# Patient Record
Sex: MALE | Race: WHITE | HISPANIC OR LATINO | Employment: FULL TIME | URBAN - METROPOLITAN AREA
[De-identification: names, ages, dates, MRNs, and addresses within clinical notes are randomized per-mention and may not be internally consistent; named-entity substitution may affect disease eponyms.]

---

## 2017-10-11 ENCOUNTER — HOSPITAL ENCOUNTER (EMERGENCY)
Facility: HOSPITAL | Age: 57
Discharge: HOME | End: 2017-10-11
Attending: EMERGENCY MEDICINE | Admitting: EMERGENCY MEDICINE
Payer: COMMERCIAL

## 2017-10-11 VITALS
TEMPERATURE: 98 F | DIASTOLIC BLOOD PRESSURE: 86 MMHG | OXYGEN SATURATION: 99 % | HEART RATE: 79 BPM | SYSTOLIC BLOOD PRESSURE: 146 MMHG | RESPIRATION RATE: 18 BRPM

## 2017-10-11 DIAGNOSIS — S39.012A STRAIN OF LUMBAR REGION, INITIAL ENCOUNTER: Primary | ICD-10-CM

## 2017-10-11 PROCEDURE — 99283 EMERGENCY DEPT VISIT LOW MDM: CPT

## 2017-10-11 RX ORDER — ALFUZOSIN HYDROCHLORIDE 10 MG/1
10 TABLET, EXTENDED RELEASE ORAL DAILY
COMMUNITY

## 2017-10-11 RX ORDER — CYCLOBENZAPRINE HCL 10 MG
10 TABLET ORAL 3 TIMES DAILY
Qty: 15 TABLET | Refills: 0 | Status: SHIPPED | OUTPATIENT
Start: 2017-10-11 | End: 2020-03-13 | Stop reason: SDUPTHER

## 2017-10-11 RX ORDER — HYDROCODONE BITARTRATE AND ACETAMINOPHEN 5; 325 MG/1; MG/1
1 TABLET ORAL EVERY 6 HOURS PRN
Qty: 15 TABLET | Refills: 0 | Status: SHIPPED | OUTPATIENT
Start: 2017-10-11 | End: 2020-03-13 | Stop reason: ALTCHOICE

## 2017-10-11 NOTE — DISCHARGE INSTRUCTIONS
Low Back Strain   WHAT YOU NEED TO KNOW:   Low back strain is an injury to your lower back muscles or tendons  Tendons are strong tissues that connect muscles to bones  The lower back supports most of your body weight and helps you move, twist, and bend  DISCHARGE INSTRUCTIONS:   Return to the emergency department if:   · You hear or feel a pop in your lower back  · You have increased swelling or pain in your lower back  · You have trouble moving your legs  · Your legs are numb  Contact your healthcare provider if:   · You have a fever  · Your pain does not go away, even after treatment  · You have questions or concerns about your condition or care  Medicines: The following medicines may be ordered by your healthcare provider:  · Acetaminophen decreases pain and fever  It is available without a doctor's order  Ask how much to take and how often to take it  Follow directions  Acetaminophen can cause liver damage if not taken correctly  · NSAIDs , such as ibuprofen, help decrease swelling, pain, and fever  This medicine is available with or without a doctor's order  NSAIDs can cause stomach bleeding or kidney problems in certain people  If you take blood thinner medicine, always ask your healthcare provider if NSAIDs are safe for you  Always read the medicine label and follow directions  · Muscle relaxers  help decrease pain and muscle spasms  · Prescription pain medicine  may be given  Ask how to take this medicine safely  · Take your medicine as directed  Contact your healthcare provider if you think your medicine is not helping or if you have side effects  Tell him or her if you are allergic to any medicine  Keep a list of the medicines, vitamins, and herbs you take  Include the amounts, and when and why you take them  Bring the list or the pill bottles to follow-up visits  Carry your medicine list with you in case of an emergency  Self-care:   · Rest  as directed   You may need to rest in bed for a period of time after your injury  Do not lift heavy objects  · Apply ice  on your back for 15 to 20 minutes every hour or as directed  Use an ice pack, or put crushed ice in a plastic bag  Cover it with a towel  Ice helps prevent tissue damage and decreases swelling and pain  · Apply heat  on your lower back for 20 to 30 minutes every 2 hours for as many days as directed  Heat helps decrease pain and muscle spasms  · Slowly start to increase your activity  as the pain decreases, or as directed  Prevent another low back strain:   · Use correct body movements  ¨ Bend at the hips and knees when you  objects  Do not bend from the waist  Use your leg muscles as you lift the load  Do not use your back  Keep the object close to your chest as you lift it  Try not to twist or lift anything above your waist     ¨ Change your position often when you stand for long periods of time  Rest one foot on a small box or footrest, and then switch to the other foot often  ¨ Try not to sit for long periods of time  When you do, sit in a straight-backed chair with your feet flat on the floor  ¨ Never reach, pull, or push while you are sitting  · Warm up before you exercise  Do exercises that strengthen your back muscles  Ask your healthcare provider about the best exercise plan for you  · Maintain a healthy weight  Ask your healthcare provider how much you should weigh  Ask him to help you create a weight loss plan if you are overweight  © 2017 [address] [employer] Information is for End User's use only and may not be sold, redistributed or otherwise used for commercial purposes  All illustrations and images included in CareNotes® are the copyrighted property of [employer]  or Joseph [employer]  The above information is an  only  It is not intended as medical advice for individual conditions or treatments   Talk to your doctor, nurse or pharmacist before following any medical regimen to see if it is safe and effective for you  Low Back Strain   WHAT YOU NEED TO KNOW:   What is low back strain? Low back strain is an injury to your lower back muscles or tendons  Tendons are strong tissues that connect muscles to bones  The lower back supports most of your body weight and helps you move, twist, and bend  What causes low back strain? Low back strain is usually caused by activities that increase stress on the lower back, such as exercise or injury  The following may increase your risk for low back strain:  · You have had low back strain before  · You lift heavy objects with your back instead of your legs  · You do not warm up before you exercise  · You sit or stand for long periods of time  · You are overweight  What are the signs and symptoms of low back strain? · Low back pain or muscle spasms           · Stiffness or limited movement    · Pain that goes down to the buttocks, groin, or legs    · Pain that is worse with activity  How is low back strain diagnosed? An x-ray, CT scan, or MRI may be done to check for damage to your spine, muscles, or tendons  You may be given contrast liquid to help the tissues in your lower back show up better in the pictures  Tell the healthcare provider if you have ever had an allergic reaction to contrast liquid  Do not enter the MRI room with anything metal  Metal can cause serious injury  Tell the healthcare provider if you have any metal in or on your body  How is low back strain treated? · Acetaminophen decreases pain and fever  It is available without a doctor's order  Ask how much to take and how often to take it  Follow directions  Acetaminophen can cause liver damage if not taken correctly  · NSAIDs , such as ibuprofen, help decrease swelling, pain, and fever  This medicine is available with or without a doctor's order   NSAIDs can cause stomach bleeding or kidney problems in certain people  If you take blood thinner medicine, always ask your healthcare provider if NSAIDs are safe for you  Always read the medicine label and follow directions  · Muscle relaxers  help decrease pain and muscle spasms  · Prescription pain medicine  may be given  Ask how to take this medicine safely  · Surgery  may be needed if your strain is severe  How can I manage my symptoms? · Rest  as directed  You may need to rest in bed for a period of time after your injury  Do not lift heavy objects  · Apply ice  on your back for 15 to 20 minutes every hour or as directed  Use an ice pack, or put crushed ice in a plastic bag  Cover it with a towel  Ice helps prevent tissue damage and decreases swelling and pain  · Apply heat  on your lower back for 20 to 30 minutes every 2 hours for as many days as directed  Heat helps decrease pain and muscle spasms  · Slowly start to increase your activity  as the pain decreases, or as directed  How can low back strain be prevented? · Use correct body movements  ¨ Bend at the hips and knees when you  objects  Do not bend from the waist  Use your leg muscles as you lift the load  Do not use your back  Keep the object close to your chest as you lift it  Try not to twist or lift anything above your waist     ¨ Change your position often when you stand for long periods of time  Rest one foot on a small box or footrest, and then switch to the other foot often  ¨ Try not to sit for long periods of time  When you do, sit in a straight-backed chair with your feet flat on the floor  ¨ Never reach, pull, or push while you are sitting  · Warm up before you exercise  Do exercises that strengthen your back muscles  Ask your healthcare provider about the best exercise plan for you  · Maintain a healthy weight  Ask your healthcare provider how much you should weigh  Ask him to help you create a weight loss plan if you are overweight    When should I seek immediate care? · You hear or feel a pop in your lower back  · You have increased swelling or pain in your lower back  · You have trouble moving your legs  · Your legs are numb  When should I contact my healthcare provider? · You have a fever  · Your pain does not go away, even after treatment  · You have questions or concerns about your condition or care  CARE AGREEMENT:   You have the right to help plan your care  Learn about your health condition and how it may be treated  Discuss treatment options with your caregivers to decide what care you want to receive  You always have the right to refuse treatment  The above information is an  only  It is not intended as medical advice for individual conditions or treatments  Talk to your doctor, nurse or pharmacist before following any medical regimen to see if it is safe and effective for you  © 2017 [facility] Information is for End User's use only and may not be sold, redistributed or otherwise used for commercial purposes  All illustrations and images included in CareNotes® are the copyrighted property of [employer]  or Joseph Santos

## 2017-10-11 NOTE — ED PROVIDER NOTES
History  Chief Complaint   Patient presents with    Back Pain     Pt reports LBP radiating into the L leg after a long drive today  Patient presents to the emergency department for evaluation of left lower back pain  He states he bent down to  something during the day when his pain began  States he has some radiation to the posterior thigh but denies weakness in the extremity  He denies saddle anesthesia or bowel or bladder incontinence  He gets episodic back pain by history  Prior to Admission Medications   Prescriptions Last Dose Informant Patient Reported? Taking? alfuzosin (UROXATRAL) 10 mg 24 hr tablet   Yes Yes   Sig: Take 10 mg by mouth daily   metFORMIN (GLUCOPHAGE) 500 mg tablet 10/11/2017 at Unknown time  Yes Yes   Sig: Take 500 mg by mouth 2 (two) times a day with meals  Facility-Administered Medications: None       Past Medical History:   Diagnosis Date    BPH (benign prostatic hyperplasia)     [facility]        Past Surgical History:   Procedure Laterality Date    CHOLECYSTECTOMY         History reviewed  No pertinent family history  I have reviewed and agree with the history as documented  Social History   Substance Use Topics    Smoking status: Never Smoker    Smokeless tobacco: Not on file    Alcohol use No        Review of Systems   Constitutional: Negative  Negative for fever  HENT: Negative  Eyes: Negative  Respiratory: Negative  Cardiovascular: Negative  Gastrointestinal: Negative  Endocrine: Negative  Genitourinary: Negative  Negative for difficulty urinating, flank pain, frequency and urgency  Musculoskeletal: Positive for back pain  Negative for gait problem, neck pain and neck stiffness  Skin: Negative  Negative for rash and wound  Allergic/Immunologic: Negative  Neurological: Negative  Negative for weakness and numbness  Hematological: Negative  Psychiatric/Behavioral: Negative          Physical Exam  ED Triage Vitals [10/11/17 0104]   Temperature Pulse Respirations Blood Pressure SpO2   98 °F (36 7 °C) 79 18 146/86 99 %      Temp Source Heart Rate Source Patient Position - Orthostatic VS BP Location FiO2 (%)   Oral Monitor Sitting Right arm --      Pain Score       8           Physical Exam   Constitutional: He is oriented to person, place, and time  He appears well-developed and well-nourished  Nontoxic appearance without respiratory distress  Patient seems comfortable sitting upright in the stretcher  Abdominal: Soft  Bowel sounds are normal  He exhibits no distension and no mass  There is no tenderness  There is no rebound and no guarding  No hernia  Musculoskeletal: He exhibits tenderness  He exhibits no edema or deformity  Mild tenderness to palpation to the left paralumbar region  No midline tenderness  No swelling asymmetry or bruising  Neurological: He is alert and oriented to person, place, and time  He displays normal reflexes  No cranial nerve deficit or sensory deficit  He exhibits normal muscle tone  Coordination normal    Skin: Skin is warm and dry  No rash noted  Psychiatric: He has a normal mood and affect  His behavior is normal  Judgment and thought content normal    Nursing note and vitals reviewed  ED Medications  Medications - No data to display    Diagnostic Studies  Labs Reviewed - No data to display    No orders to display       Procedures  Procedures      Phone Contacts  ED Phone Contact    ED Course  ED Course as of Oct 11 0126   Wed Oct 11, 2017   0112 Patient is stable for discharge  Game script for Vicodin and Flexeril as he has requested  I advised him not to use both simultaneously  I discussed signs and symptoms requiring return to the emergency department  Will refer to private physician                                  JENAE  CritCare Time    Disposition  Final diagnoses:   Strain of lumbar region, initial encounter     ED Disposition     ED Disposition Condition Comment    Discharge  Salbador Nguyen discharge to home/self care  Condition at discharge: Stable        Follow-up Information     Follow up With Specialties Details [facility] Physician  Schedule an appointment as soon as possible for a visit          Patient's Medications   Discharge Prescriptions    CYCLOBENZAPRINE (FLEXERIL) 10 MG TABLET    Take 1 tablet by mouth 3 (three) times a day       Start Date: 10/11/2017End Date: --       Order Dose: 10 mg       Quantity: 15 tablet    Refills: 0    HYDROCODONE-ACETAMINOPHEN (NORCO) 5-325 MG PER TABLET    Take 1 tablet by mouth every 6 (six) hours as needed for pain Max Daily Amount: 4 tablets       Start Date: 10/11/2017End Date: --       Order Dose: 1 tablet       Quantity: 15 tablet    Refills: 0     No discharge procedures on file      ED Provider  Electronically Signed by       Abel Starks MD  10/11/17 8210

## 2020-03-13 ENCOUNTER — OFFICE VISIT (OUTPATIENT)
Dept: FAMILY MEDICINE CLINIC | Facility: CLINIC | Age: 60
End: 2020-03-13

## 2020-03-13 VITALS
BODY MASS INDEX: 39.52 KG/M2 | DIASTOLIC BLOOD PRESSURE: 92 MMHG | TEMPERATURE: 97.3 F | HEART RATE: 84 BPM | SYSTOLIC BLOOD PRESSURE: 130 MMHG | RESPIRATION RATE: 18 BRPM | WEIGHT: 275.4 LBS

## 2020-03-13 DIAGNOSIS — M54.41 CHRONIC RIGHT-SIDED LOW BACK PAIN WITH RIGHT-SIDED SCIATICA: Primary | ICD-10-CM

## 2020-03-13 DIAGNOSIS — G89.29 CHRONIC RIGHT-SIDED LOW BACK PAIN WITH RIGHT-SIDED SCIATICA: Primary | ICD-10-CM

## 2020-03-13 PROCEDURE — 99213 OFFICE O/P EST LOW 20 MIN: CPT | Performed by: FAMILY MEDICINE

## 2020-03-13 PROCEDURE — 1036F TOBACCO NON-USER: CPT | Performed by: FAMILY MEDICINE

## 2020-03-13 RX ORDER — NAPROXEN 500 MG/1
500 TABLET ORAL 2 TIMES DAILY WITH MEALS
Qty: 60 TABLET | Refills: 0 | Status: SHIPPED | OUTPATIENT
Start: 2020-03-13 | End: 2020-04-12

## 2020-03-13 RX ORDER — DUTASTERIDE 0.5 MG/1
1 CAPSULE, LIQUID FILLED ORAL DAILY
COMMUNITY
Start: 2014-06-24

## 2020-03-13 RX ORDER — CYCLOBENZAPRINE HCL 10 MG
10 TABLET ORAL
Qty: 15 TABLET | Refills: 0 | Status: SHIPPED | OUTPATIENT
Start: 2020-03-13 | End: 2020-03-13

## 2020-03-13 RX ORDER — CYCLOBENZAPRINE HCL 10 MG
10 TABLET ORAL
Qty: 15 TABLET | Refills: 0 | Status: SHIPPED | OUTPATIENT
Start: 2020-03-13 | End: 2020-04-07 | Stop reason: SDUPTHER

## 2020-03-13 NOTE — ASSESSMENT & PLAN NOTE
Acute on chronic  · Patient has worsening back pain with sciatica from driving more frequently than usual  · He is planning on going for accupuncture  · He is also interested in OMT today  · No focal neuro deficits or red flag symptoms  · Will start naproxen 500mg BID for 7 days  · Start flexeril 10mg qhs  · Patient may use OTC lidocaine patches and warm compresses to back to help with spasm  · Referral for physical therapy given

## 2020-03-13 NOTE — PROGRESS NOTES
Assessment/Plan:    Chronic right-sided low back pain with right-sided sciatica  Acute on chronic  · Patient has worsening back pain with sciatica from driving more frequently than usual  · He is planning on going for accupuncture  · He is also interested in OMT today  · No focal neuro deficits or red flag symptoms  · Will start naproxen 500mg BID for 7 days  · Start flexeril 10mg qhs  · Patient may use OTC lidocaine patches and warm compresses to back to help with spasm  · Referral for physical therapy given           Problem List Items Addressed This Visit        Nervous and Auditory    Chronic right-sided low back pain with right-sided sciatica - Primary     Acute on chronic  · Patient has worsening back pain with sciatica from driving more frequently than usual  · He is planning on going for accupuncture  · He is also interested in OMT today  · No focal neuro deficits or red flag symptoms  · Will start naproxen 500mg BID for 7 days  · Start flexeril 10mg qhs  · Patient may use OTC lidocaine patches and warm compresses to back to help with spasm  · Referral for physical therapy given           Relevant Medications    naproxen (NAPROSYN) 500 mg tablet    cyclobenzaprine (FLEXERIL) 10 mg tablet    Other Relevant Orders    Ambulatory referral to Physical Therapy            Subjective:      Patient ID: Heather Duenas is a 63 y o  male  HPI   44-year-old with history of back pain spasm right-sided sciatica  Patient has been traveling to [facility] which requires him sit and drive for periods of time which exacerbated his symptoms  Admits to 3 days pain that has been causing difficulty sleeping  Has been seen several times in the past in the ED for symptoms and treated with short course of Flexeril and Vicodin  Patient is requesting those medications today  Discussed patient that using narcotics to treat muscle spasm is not indicated and can have side effects of sedation and addiction    Patient agrees and is planning going for acupuncture later today  Patient denies urinary or bowel incontinence, saddle anesthesia, weakness, or recent falls/trauma to his lower back  Patient is obese and does not exercise, although he does admit improved diet of low carbohydrates and intermittent fasting  The following portions of the patient's history were reviewed and updated as appropriate: allergies, current medications, past family history, past medical history, past social history, past surgical history and problem list     Review of Systems   Constitutional: Negative for activity change, chills, fatigue and fever  HENT: Negative for congestion, hearing loss, sinus pain and sore throat  Respiratory: Negative for cough, chest tightness, shortness of breath and wheezing  Cardiovascular: Negative for chest pain, palpitations and leg swelling  Gastrointestinal: Negative for abdominal pain, blood in stool, constipation, diarrhea, nausea and vomiting  Genitourinary: Negative for dysuria, flank pain and frequency  Musculoskeletal: Positive for back pain  Negative for gait problem, joint swelling, myalgias, neck pain and neck stiffness  Skin: Negative for pallor and rash  Neurological: Negative for dizziness, syncope, weakness, light-headedness, numbness and headaches  Objective:      /92 (BP Location: Left arm, Patient Position: Sitting, Cuff Size: Large)   Pulse 84   Temp (!) 97 3 °F (36 3 °C) (Tympanic)   Resp 18   Wt 125 kg (275 lb 6 4 oz)   BMI 39 52 kg/m²          Physical Exam   Constitutional: He is oriented to person, place, and time  He appears well-developed and well-nourished  No distress  HENT:   Head: Normocephalic and atraumatic  Right Ear: External ear normal    Left Ear: External ear normal    Mouth/Throat: Oropharynx is clear and moist  No oropharyngeal exudate  Eyes: Pupils are equal, round, and reactive to light  EOM are normal  No scleral icterus     Neck: Normal range of motion  Neck supple  No tracheal deviation present  Cardiovascular: Normal rate, regular rhythm, normal heart sounds and intact distal pulses  Exam reveals no friction rub  No murmur heard  Pulmonary/Chest: Effort normal and breath sounds normal  No respiratory distress  He has no wheezes  He has no rales  He exhibits no tenderness  Abdominal: Soft  Bowel sounds are normal  He exhibits no distension  There is no tenderness  There is no rebound and no guarding  Musculoskeletal: Normal range of motion  He exhibits no edema  Thoracic back: He exhibits normal range of motion, no tenderness, no bony tenderness and no spasm  Lumbar back: He exhibits no tenderness, no bony tenderness, no swelling, no edema and no spasm  Right buttock/piriformis spasm   Neurological: He is alert and oriented to person, place, and time  He has normal reflexes  He displays normal reflexes  No cranial nerve deficit or sensory deficit  He exhibits normal muscle tone  Coordination normal    Skin: Skin is warm and dry  He is not diaphoretic  Psychiatric: He has a normal mood and affect  Vitals reviewed

## 2020-04-07 ENCOUNTER — TELEPHONE (OUTPATIENT)
Dept: FAMILY MEDICINE CLINIC | Facility: CLINIC | Age: 60
End: 2020-04-07

## 2020-04-07 DIAGNOSIS — G89.29 CHRONIC RIGHT-SIDED LOW BACK PAIN WITH RIGHT-SIDED SCIATICA: ICD-10-CM

## 2020-04-07 DIAGNOSIS — M54.41 CHRONIC RIGHT-SIDED LOW BACK PAIN WITH RIGHT-SIDED SCIATICA: ICD-10-CM

## 2020-04-07 RX ORDER — CYCLOBENZAPRINE HCL 10 MG
10 TABLET ORAL
Qty: 60 TABLET | Refills: 0 | Status: SHIPPED | OUTPATIENT
Start: 2020-04-07 | End: 2020-06-27

## 2020-04-08 DIAGNOSIS — M54.41 CHRONIC RIGHT-SIDED LOW BACK PAIN WITH RIGHT-SIDED SCIATICA: ICD-10-CM

## 2020-04-08 DIAGNOSIS — G89.29 CHRONIC RIGHT-SIDED LOW BACK PAIN WITH RIGHT-SIDED SCIATICA: ICD-10-CM

## 2020-04-12 RX ORDER — NAPROXEN 500 MG/1
TABLET ORAL
Qty: 60 TABLET | Refills: 0 | Status: SHIPPED | OUTPATIENT
Start: 2020-04-12 | End: 2021-04-27

## 2020-06-26 DIAGNOSIS — G89.29 CHRONIC RIGHT-SIDED LOW BACK PAIN WITH RIGHT-SIDED SCIATICA: ICD-10-CM

## 2020-06-26 DIAGNOSIS — M54.41 CHRONIC RIGHT-SIDED LOW BACK PAIN WITH RIGHT-SIDED SCIATICA: ICD-10-CM

## 2020-06-27 RX ORDER — CYCLOBENZAPRINE HCL 10 MG
TABLET ORAL
Qty: 30 TABLET | Refills: 1 | Status: SHIPPED | OUTPATIENT
Start: 2020-06-27 | End: 2020-09-04

## 2020-07-24 ENCOUNTER — HOSPITAL ENCOUNTER (EMERGENCY)
Facility: HOSPITAL | Age: 60
Discharge: HOME | End: 2020-07-24
Attending: EMERGENCY MEDICINE
Payer: COMMERCIAL

## 2020-07-24 VITALS
BODY MASS INDEX: 37.77 KG/M2 | WEIGHT: 263.23 LBS | SYSTOLIC BLOOD PRESSURE: 127 MMHG | OXYGEN SATURATION: 98 % | HEART RATE: 118 BPM | RESPIRATION RATE: 20 BRPM | DIASTOLIC BLOOD PRESSURE: 90 MMHG | TEMPERATURE: 99.1 F

## 2020-07-24 DIAGNOSIS — E11.65 HYPERGLYCEMIA DUE TO TYPE 2 DIABETES MELLITUS (HCC): ICD-10-CM

## 2020-07-24 DIAGNOSIS — L03.113 RIGHT ARM CELLULITIS: Primary | ICD-10-CM

## 2020-07-24 DIAGNOSIS — R00.0 SINUS TACHYCARDIA: ICD-10-CM

## 2020-07-24 LAB
ATRIAL RATE: 101 BPM
GLUCOSE SERPL-MCNC: 295 MG/DL (ref 65–140)
P AXIS: 74 DEGREES
PR INTERVAL: 154 MS
QRS AXIS: 15 DEGREES
QRSD INTERVAL: 92 MS
QT INTERVAL: 314 MS
QTC INTERVAL: 396 MS
T WAVE AXIS: 42 DEGREES
VENTRICULAR RATE: 96 BPM

## 2020-07-24 PROCEDURE — 99284 EMERGENCY DEPT VISIT MOD MDM: CPT | Performed by: EMERGENCY MEDICINE

## 2020-07-24 PROCEDURE — 82948 REAGENT STRIP/BLOOD GLUCOSE: CPT

## 2020-07-24 PROCEDURE — 93005 ELECTROCARDIOGRAM TRACING: CPT

## 2020-07-24 PROCEDURE — 93010 ELECTROCARDIOGRAM REPORT: CPT | Performed by: INTERNAL MEDICINE

## 2020-07-24 PROCEDURE — 99283 EMERGENCY DEPT VISIT LOW MDM: CPT

## 2020-07-24 RX ORDER — CEPHALEXIN 500 MG/1
500 CAPSULE ORAL 4 TIMES DAILY
Qty: 27 CAPSULE | Refills: 0 | Status: SHIPPED | OUTPATIENT
Start: 2020-07-24 | End: 2020-07-31

## 2020-07-24 RX ORDER — CEPHALEXIN 250 MG/1
500 CAPSULE ORAL ONCE
Status: COMPLETED | OUTPATIENT
Start: 2020-07-24 | End: 2020-07-24

## 2020-07-24 RX ADMIN — CEPHALEXIN 500 MG: 250 CAPSULE ORAL at 19:16

## 2020-07-25 NOTE — ED PROCEDURE NOTE
PROCEDURE  ECG 12 Lead Documentation Only  Date/Time: 7/24/2020 8:07 PM  Performed by: Deysi Vargas MD  Authorized by: Deysi Vargas MD     Indications / Diagnosis:  Tachy   ECG reviewed by me, the ED Provider: yes    Patient location:  ED  Previous ECG:     Previous ECG:  Compared to current    Comparison ECG info:  7/24/15- no sign changes    Similarity:  No change    Comparison to cardiac monitor: Yes    Interpretation:     Interpretation: non-specific    Rate:     ECG rate:  96    ECG rate assessment: normal    Rhythm:     Rhythm: sinus rhythm    Ectopy:     Ectopy: PVCs      PVCs:  Infrequent and unifocal  QRS:     QRS axis:  Normal    QRS intervals:  Normal  Conduction:     Conduction: normal    ST segments:     ST segments:  Normal  T waves:     T waves: flattening      Flattening:  AVL, III, V1, V5 and V6  Q waves:     Q waves:  V1 and V2  Other findings:     Other findings: poor R wave progression and U wave    Comments:      Low voltage criteria - no ecg signs of ischemia/ injury / r heart strain / french/pericarditis         Deysi Vargas MD  07/24/20 2011

## 2020-07-25 NOTE — DISCHARGE INSTRUCTIONS
Diagnosis: right arm cellulitis// hyperglycemia- elevated blodod sugar 295 in er- sinus tachycardia heart rate 110-110 in er    - starting tonight before bedtime-- keflex- 1 tablet 4 times a day for 7 days    - please elevate  arm at heart level while resting-- expect do to gravity for some swelling below elbow    - please wash area daily with soap and water- do not itch or scratch area     - please return to  the er for any fever-s temp > 100 4/ any redness/swelling spreading up  arm- any chills- or any increasing swelling at point of elbow-  at present you have no fluid collection at that area of your elbow  ( called olecranon ) that would need drainage     - your blodod sugar was elevated in the er -- please check yoru sugar daily -- with the infection your sugar will go up-- you can increase your metformin- the max dose is 1000 mg 2 times a day with meals

## 2020-07-28 NOTE — ED PROVIDER NOTES
History  Chief Complaint   Patient presents with    Arm Swelling     patient reports being bit by bug on right arm three days ago  now has redness and swelling around area     62 YR MALE STATES  3 DAYS AGO WHILE IN YARD  FEELS GOT BITE BY BUG ON BACK OF R ELBOW- SINCE HAS BEEN SCRATCHING AREA INTERMITENTLY AND  TODAY WITH REDNESS/SWELLIGN OF AREA- NO HX OF CA MRSA- NO FEVERS- CHILLS/ SYSTEMIC COMPS       History provided by:  Patient   used: No        Prior to Admission Medications   Prescriptions Last Dose Informant Patient Reported? Taking? alfuzosin (UROXATRAL) 10 mg 24 hr tablet  Self Yes No   Sig: Take 10 mg by mouth daily   cyclobenzaprine (FLEXERIL) 10 mg tablet   No No   Sig: TAKE 1 TABLET BY MOUTH DAILY AT BEDTIME   dutasteride (Avodart) 0 5 mg capsule  Self Yes No   Sig: Take 1 capsule by mouth daily   metFORMIN (GLUCOPHAGE) 500 mg tablet  Self Yes No   Sig: Take 500 mg by mouth 2 (two) times a day with meals  naproxen (NAPROSYN) 500 mg tablet   No No   Sig: TAKE 1 TABLET BY MOUTH TWICE A DAY WITH MEALS      Facility-Administered Medications: None       Past Medical History:   Diagnosis Date    BPH (benign prostatic hyperplasia)     Diabetes mellitus ([facility] [address] )        Past Surgical History:   Procedure Laterality Date    CHOLECYSTECTOMY         History reviewed  No pertinent family history  I have reviewed and agree with the history as documented  E-Cigarette/Vaping    E-Cigarette Use Never User      E-Cigarette/Vaping Substances    Nicotine No     THC No     CBD No     Flavoring No     Other No     Unknown No      Social History     Tobacco Use    Smoking status: Never Smoker    Smokeless tobacco: Never Used   Substance Use Topics    Alcohol use: No    Drug use: No       Review of Systems   Constitutional: Negative  HENT: Negative  Eyes: Negative  Respiratory: Negative  Cardiovascular: Negative  Gastrointestinal: Negative  Endocrine: Negative  Genitourinary: Negative  Musculoskeletal: Negative  Skin: Positive for wound  Negative for color change, pallor and rash  R ELBOW SWELLING    Allergic/Immunologic: Negative  Neurological: Negative  Hematological: Negative  Psychiatric/Behavioral: Negative  Physical Exam  Physical Exam   Constitutional: He is oriented to person, place, and time  He appears well-developed and well-nourished  No distress  AVSS - TACHY- WHICH IMPROVED IN ER -- PULSE OX 98 % ON RA- INTERPRETATION IS NORMAL- NO INTERVENTION    HENT:   Head: Normocephalic and atraumatic  Eyes: Pupils are equal, round, and reactive to light  Conjunctivae and EOM are normal  Right eye exhibits no discharge  Left eye exhibits no discharge  No scleral icterus  MM PINK   Neck: Normal range of motion  Neck supple  No JVD present  No tracheal deviation present  No thyromegaly present  NO PMT C/T/L/S SPINE    Cardiovascular: Regular rhythm, normal heart sounds and intact distal pulses  Exam reveals no gallop and no friction rub  No murmur heard  TACHY    Pulmonary/Chest: Effort normal and breath sounds normal  No stridor  No respiratory distress  He has no wheezes  He has no rales  He exhibits no tenderness  Abdominal: Soft  Bowel sounds are normal  He exhibits no distension and no mass  There is no tenderness  There is no rebound and no guarding  No hernia  SOFT NT/;ND- NO HSM- NO CVA TENDERNESS/ NO PERITONEAL SIGNS   Musculoskeletal: Normal range of motion  He exhibits edema and tenderness  He exhibits no deformity  R ELBOW-  POS POSTERIOR PALM SIZED AREA OF ERYTHEMA/ WARMTH/ TENDERNESS WITH SMALL OPEN AREA SEEN-  NO OLECRANON BURSITIS- NO ABSCESS- NO ASCENDING ERYTHEMA    - TRACE BLE PRETIBIAL EDEMA- NT- NO ASYM/ ERYTHEMA- EQUAL BILATERAL RADIAL/DP PULSES   Lymphadenopathy:     He has no cervical adenopathy  Neurological: He is alert and oriented to person, place, and time   No cranial nerve deficit or sensory deficit  He exhibits normal muscle tone  Coordination normal    NORMAL NON FOCAL NEURO EX AM    Skin: Skin is warm  Capillary refill takes less than 2 seconds  No rash noted  He is not diaphoretic  No erythema  No pallor  SEE RUE DESCERIPTION ABOVE    Psychiatric: He has a normal mood and affect  His behavior is normal  Judgment and thought content normal    Nursing note and vitals reviewed        Vital Signs  ED Triage Vitals [07/24/20 1842]   Temperature Pulse Respirations Blood Pressure SpO2   99 1 °F (37 3 °C) (!) 132 20 127/90 98 %      Temp Source Heart Rate Source Patient Position - Orthostatic VS BP Location FiO2 (%)   Oral Monitor Lying Right arm --      Pain Score       --           Vitals:    07/24/20 1842 07/24/20 1923   BP: 127/90    Pulse: (!) 132 (!) 118   Patient Position - Orthostatic VS: Lying          Visual Acuity      ED Medications  Medications   cephalexin (KEFLEX) capsule 500 mg (500 mg Oral Given 7/24/20 1916)       Diagnostic Studies  Results Reviewed     Procedure Component Value Units Date/Time    Fingerstick Glucose (POCT) [[id number]]  (Abnormal) Collected:  07/24/20 1907    Lab Status:  Final result Updated:  07/24/20 1909     POC Glucose 295 mg/dl                  No orders to display              Procedures  Procedures         ED Course  ED Course as of Jul 28 1712 Fri Jul 24, 2020 1955 Er md procedure note-   fror st u;s of r olecranon - image on chart- no fluid collection seen over olecranon bursa                                                MDM      Disposition  Final diagnoses:   Right arm cellulitis   Hyperglycemia due to type 2 diabetes mellitus ([facility] [address] )   Sinus tachycardia     Time reflects when diagnosis was documented in both MDM as applicable and the Disposition within this note     Time User Action Codes Description Comment    7/24/2020  7:56 PM Arzella Taylor Add [L03 114] Cellulitis of left arm     7/24/2020  7:56 PM Arzella Taylor Remove [L03 114] Cellulitis of left arm     7/24/2020  7:56 PM Eual Cola Add [M89 950] Right arm cellulitis     7/24/2020  7:56 PM Eual Cola Add [E11 65] Hyperglycemia due to type 2 diabetes mellitus ([facility] [address] )     7/24/2020  7:57 PM Eual Cola Add [R00 0] Sinus tachycardia       ED Disposition     ED Disposition Condition Date/Time Comment    Discharge Stable Fri Jul 24, 2020  7:56 PM Valeriano Donovan discharge to home/self care  Follow-up Information    None         Discharge Medication List as of 7/24/2020  8:02 PM      START taking these medications    Details   cephalexin (KEFLEX) 500 mg capsule Take 1 capsule (500 mg total) by mouth 4 (four) times a day for 27 doses, Starting Fri 7/24/2020, Until Fri 7/31/2020, Normal         CONTINUE these medications which have NOT CHANGED    Details   alfuzosin (UROXATRAL) 10 mg 24 hr tablet Take 10 mg by mouth daily, Historical Med      cyclobenzaprine (FLEXERIL) 10 mg tablet TAKE 1 TABLET BY MOUTH DAILY AT BEDTIME, Normal      dutasteride (Avodart) 0 5 mg capsule Take 1 capsule by mouth daily, Starting Tue 6/24/2014, Historical Med      metFORMIN (GLUCOPHAGE) 500 mg tablet Take 500 mg by mouth 2 (two) times a day with meals  , Historical Med      naproxen (NAPROSYN) 500 mg tablet TAKE 1 TABLET BY MOUTH TWICE A DAY WITH MEALS, Normal           No discharge procedures on file      PDMP Review     None          ED Provider  Electronically Signed by           Linsey Suarez MD  07/28/20 5674

## 2020-09-04 DIAGNOSIS — G89.29 CHRONIC RIGHT-SIDED LOW BACK PAIN WITH RIGHT-SIDED SCIATICA: ICD-10-CM

## 2020-09-04 DIAGNOSIS — M54.41 CHRONIC RIGHT-SIDED LOW BACK PAIN WITH RIGHT-SIDED SCIATICA: ICD-10-CM

## 2020-09-04 RX ORDER — CYCLOBENZAPRINE HCL 10 MG
TABLET ORAL
Qty: 30 TABLET | Refills: 1 | Status: SHIPPED | OUTPATIENT
Start: 2020-09-04 | End: 2020-11-20

## 2020-11-19 DIAGNOSIS — G89.29 CHRONIC RIGHT-SIDED LOW BACK PAIN WITH RIGHT-SIDED SCIATICA: ICD-10-CM

## 2020-11-19 DIAGNOSIS — M54.41 CHRONIC RIGHT-SIDED LOW BACK PAIN WITH RIGHT-SIDED SCIATICA: ICD-10-CM

## 2020-11-20 RX ORDER — CYCLOBENZAPRINE HCL 10 MG
TABLET ORAL
Qty: 30 TABLET | Refills: 1 | Status: SHIPPED | OUTPATIENT
Start: 2020-11-20 | End: 2021-02-01

## 2021-01-31 DIAGNOSIS — M54.41 CHRONIC RIGHT-SIDED LOW BACK PAIN WITH RIGHT-SIDED SCIATICA: ICD-10-CM

## 2021-01-31 DIAGNOSIS — G89.29 CHRONIC RIGHT-SIDED LOW BACK PAIN WITH RIGHT-SIDED SCIATICA: ICD-10-CM

## 2021-02-01 RX ORDER — CYCLOBENZAPRINE HCL 10 MG
TABLET ORAL
Qty: 30 TABLET | Refills: 1 | Status: SHIPPED | OUTPATIENT
Start: 2021-02-01 | End: 2021-04-21

## 2021-04-13 DIAGNOSIS — G89.29 CHRONIC RIGHT-SIDED LOW BACK PAIN WITH RIGHT-SIDED SCIATICA: ICD-10-CM

## 2021-04-13 DIAGNOSIS — M54.41 CHRONIC RIGHT-SIDED LOW BACK PAIN WITH RIGHT-SIDED SCIATICA: ICD-10-CM

## 2021-04-21 RX ORDER — CYCLOBENZAPRINE HCL 10 MG
TABLET ORAL
Qty: 30 TABLET | Refills: 1 | Status: SHIPPED | OUTPATIENT
Start: 2021-04-21 | End: 2021-08-13 | Stop reason: SDUPTHER

## 2021-04-26 ENCOUNTER — HOSPITAL ENCOUNTER (EMERGENCY)
Facility: HOSPITAL | Age: 61
Discharge: HOME | End: 2021-04-27
Attending: EMERGENCY MEDICINE
Payer: COMMERCIAL

## 2021-04-26 DIAGNOSIS — R06.02 SHORTNESS OF BREATH ON EXERTION: Primary | ICD-10-CM

## 2021-04-26 DIAGNOSIS — R79.89 ELEVATED BRAIN NATRIURETIC PEPTIDE (BNP) LEVEL: ICD-10-CM

## 2021-04-26 PROCEDURE — 99285 EMERGENCY DEPT VISIT HI MDM: CPT

## 2021-04-26 NOTE — Clinical Note
Flaca Messina was seen and treated in our emergency department on 4/26/2021  Diagnosis:     Sandy Seay    He may return on this date: 04/28/2021         If you have any questions or concerns, please don't hesitate to call        Earlis Sandhoff, PA-C    ______________________________           _______________          _______________  Hospital Representative                              Date                                Time

## 2021-04-27 ENCOUNTER — APPOINTMENT (EMERGENCY)
Dept: RADIOLOGY | Facility: HOSPITAL | Age: 61
End: 2021-04-27
Payer: COMMERCIAL

## 2021-04-27 VITALS
WEIGHT: 274.03 LBS | OXYGEN SATURATION: 97 % | HEIGHT: 70 IN | TEMPERATURE: 97.8 F | SYSTOLIC BLOOD PRESSURE: 132 MMHG | BODY MASS INDEX: 39.23 KG/M2 | DIASTOLIC BLOOD PRESSURE: 86 MMHG | RESPIRATION RATE: 20 BRPM | HEART RATE: 105 BPM

## 2021-04-27 LAB
ALBUMIN SERPL BCP-MCNC: 3.8 G/DL (ref 3.5–5)
ALP SERPL-CCNC: 148 U/L (ref 46–116)
ALT SERPL W P-5'-P-CCNC: 41 U/L (ref 12–78)
ANION GAP SERPL CALCULATED.3IONS-SCNC: 9 MMOL/L (ref 4–13)
APTT PPP: 26 SECONDS (ref 23–37)
AST SERPL W P-5'-P-CCNC: 30 U/L (ref 5–45)
BASOPHILS # BLD AUTO: 0.07 THOUSANDS/ΜL (ref 0–0.1)
BASOPHILS NFR BLD AUTO: 1 % (ref 0–1)
BILIRUB SERPL-MCNC: 0.5 MG/DL (ref 0.2–1)
BUN SERPL-MCNC: 14 MG/DL (ref 5–25)
CALCIUM SERPL-MCNC: 8.5 MG/DL (ref 8.3–10.1)
CHLORIDE SERPL-SCNC: 104 MMOL/L (ref 100–108)
CO2 SERPL-SCNC: 24 MMOL/L (ref 21–32)
CREAT SERPL-MCNC: 1.09 MG/DL (ref 0.6–1.3)
D DIMER PPP FEU-MCNC: 0.45 UG/ML FEU
EOSINOPHIL # BLD AUTO: 0.14 THOUSAND/ΜL (ref 0–0.61)
EOSINOPHIL NFR BLD AUTO: 3 % (ref 0–6)
ERYTHROCYTE [DISTWIDTH] IN BLOOD BY AUTOMATED COUNT: 12.3 % (ref 11.6–15.1)
GFR SERPL CREATININE-BSD FRML MDRD: 74 ML/MIN/1.73SQ M
GLUCOSE SERPL-MCNC: 331 MG/DL (ref 65–140)
HCT VFR BLD AUTO: 46.4 % (ref 36.5–49.3)
HGB BLD-MCNC: 15.2 G/DL (ref 12–17)
IMM GRANULOCYTES # BLD AUTO: 0.03 THOUSAND/UL (ref 0–0.2)
IMM GRANULOCYTES NFR BLD AUTO: 1 % (ref 0–2)
INR PPP: 0.99 (ref 0.84–1.19)
LIPASE SERPL-CCNC: 145 U/L (ref 73–393)
LYMPHOCYTES # BLD AUTO: 1.46 THOUSANDS/ΜL (ref 0.6–4.47)
LYMPHOCYTES NFR BLD AUTO: 27 % (ref 14–44)
MCH RBC QN AUTO: 29.2 PG (ref 26.8–34.3)
MCHC RBC AUTO-ENTMCNC: 32.8 G/DL (ref 31.4–37.4)
MCV RBC AUTO: 89 FL (ref 82–98)
MONOCYTES # BLD AUTO: 0.61 THOUSAND/ΜL (ref 0.17–1.22)
MONOCYTES NFR BLD AUTO: 11 % (ref 4–12)
NEUTROPHILS # BLD AUTO: 3.17 THOUSANDS/ΜL (ref 1.85–7.62)
NEUTS SEG NFR BLD AUTO: 57 % (ref 43–75)
NRBC BLD AUTO-RTO: 0 /100 WBCS
NT-PROBNP SERPL-MCNC: 1894 PG/ML
PLATELET # BLD AUTO: 251 THOUSANDS/UL (ref 149–390)
PMV BLD AUTO: 9.7 FL (ref 8.9–12.7)
POTASSIUM SERPL-SCNC: 5.1 MMOL/L (ref 3.5–5.3)
PROT SERPL-MCNC: 7.1 G/DL (ref 6.4–8.2)
PROTHROMBIN TIME: 13.2 SECONDS (ref 11.6–14.5)
RBC # BLD AUTO: 5.21 MILLION/UL (ref 3.88–5.62)
SARS-COV-2 RNA RESP QL NAA+PROBE: NEGATIVE
SODIUM SERPL-SCNC: 137 MMOL/L (ref 136–145)
TROPONIN I SERPL-MCNC: 0.03 NG/ML
WBC # BLD AUTO: 5.48 THOUSAND/UL (ref 4.31–10.16)

## 2021-04-27 PROCEDURE — 85610 PROTHROMBIN TIME: CPT | Performed by: PHYSICIAN ASSISTANT

## 2021-04-27 PROCEDURE — U0003 INFECTIOUS AGENT DETECTION BY NUCLEIC ACID (DNA OR RNA); SEVERE ACUTE RESPIRATORY SYNDROME CORONAVIRUS 2 (SARS-COV-2) (CORONAVIRUS DISEASE [COVID-19]), AMPLIFIED PROBE TECHNIQUE, MAKING USE OF HIGH THROUGHPUT TECHNOLOGIES AS DESCRIBED BY CMS-2020-01-R: HCPCS | Performed by: PHYSICIAN ASSISTANT

## 2021-04-27 PROCEDURE — 83880 ASSAY OF NATRIURETIC PEPTIDE: CPT | Performed by: PHYSICIAN ASSISTANT

## 2021-04-27 PROCEDURE — 80053 COMPREHEN METABOLIC PANEL: CPT | Performed by: PHYSICIAN ASSISTANT

## 2021-04-27 PROCEDURE — 36415 COLL VENOUS BLD VENIPUNCTURE: CPT | Performed by: PHYSICIAN ASSISTANT

## 2021-04-27 PROCEDURE — 83690 ASSAY OF LIPASE: CPT | Performed by: PHYSICIAN ASSISTANT

## 2021-04-27 PROCEDURE — 71045 X-RAY EXAM CHEST 1 VIEW: CPT

## 2021-04-27 PROCEDURE — U0005 INFEC AGEN DETEC AMPLI PROBE: HCPCS | Performed by: PHYSICIAN ASSISTANT

## 2021-04-27 PROCEDURE — 85730 THROMBOPLASTIN TIME PARTIAL: CPT | Performed by: PHYSICIAN ASSISTANT

## 2021-04-27 PROCEDURE — 93005 ELECTROCARDIOGRAM TRACING: CPT

## 2021-04-27 PROCEDURE — 99285 EMERGENCY DEPT VISIT HI MDM: CPT | Performed by: PHYSICIAN ASSISTANT

## 2021-04-27 PROCEDURE — 85025 COMPLETE CBC W/AUTO DIFF WBC: CPT | Performed by: PHYSICIAN ASSISTANT

## 2021-04-27 PROCEDURE — 84484 ASSAY OF TROPONIN QUANT: CPT | Performed by: PHYSICIAN ASSISTANT

## 2021-04-27 PROCEDURE — 85379 FIBRIN DEGRADATION QUANT: CPT | Performed by: PHYSICIAN ASSISTANT

## 2021-04-27 RX ORDER — FLUTICASONE PROPIONATE 110 UG/1
2 AEROSOL, METERED RESPIRATORY (INHALATION) 2 TIMES DAILY
COMMUNITY

## 2021-04-27 RX ORDER — ROSUVASTATIN CALCIUM 10 MG/1
10 TABLET, COATED ORAL DAILY
COMMUNITY

## 2021-04-27 NOTE — DISCHARGE INSTRUCTIONS
Follow-up with cardiology for elevated BNP level  Follow-up with PCP  Follow up emergency department symptoms persist or exacerbate

## 2021-04-27 NOTE — ED PROVIDER NOTES
History  Chief Complaint   Patient presents with    Shortness of Breath     Pt reports SOB over the last few weeks, states he has been outside all day and thinks his allergies have been acting up  Took inhalers and antihistamines with improvement  SOB only with exertion  Denies CP or fevers  Patient is a 56-year-old male with history of diabetes and cholecystectomy that presents emergency department with shortness of breath with exertion for 1 week  Patient denies associated symptomatology  Patient had believed that his current symptoms stemming from allergies, and had taken Ventolin inhaler and Benadryl mild improvement  Patient reports having seen a cardiologist in the past but denies taking any antihypertensives or anticholesterol medications  Patient denies palliative factors with provocative factors of exertion  Patient denies not effective treatment  Patient denies fevers, chills, nausea, vomiting, diarrhea, constipation urinary symptoms  Patient's recent fall or recent trauma  Patient sick contacts or recent travel  Patient denies chest pain and abdominal pain  History provided by:  Patient   used: No    Shortness of Breath  Severity:  Mild  Onset quality:  Gradual  Duration:  1 week  Timing:  Constant  Progression:  Unchanged  Chronicity:  New  Relieved by:  Nothing  Worsened by:  Exertion  Ineffective treatments:  None tried  Associated symptoms: cough    Associated symptoms: no abdominal pain, no chest pain, no fever, no headaches, no neck pain, no PND, no rash, no sore throat and no vomiting        Prior to Admission Medications   Prescriptions Last Dose Informant Patient Reported? Taking?    alfuzosin (UROXATRAL) 10 mg 24 hr tablet 4/26/2021 at Unknown time Self Yes Yes   Sig: Take 10 mg by mouth daily   cyclobenzaprine (FLEXERIL) 10 mg tablet Past Month at Unknown time  No Yes   Sig: TAKE 1 TABLET BY MOUTH EVERYDAY AT BEDTIME   dutasteride (Avodart) 0 5 mg capsule 2021 at Unknown time Self Yes Yes   Sig: Take 1 capsule by mouth daily   fluticasone (FLOVENT HFA) 110 MCG/ACT inhaler 2021 at Unknown time  Yes Yes   Sig: Inhale 2 puffs 2 (two) times a day Rinse mouth after use  fluticasone (VERAMYST) 27 5 MCG/SPRAY nasal spray 2021 at Unknown time  Yes Yes   Si sprays into each nostril daily   metFORMIN (GLUCOPHAGE) 500 mg tablet 2021 at Unknown time Self Yes Yes   Sig: Take 1,000 mg by mouth 2 (two) times a day with meals    rosuvastatin (CRESTOR) 10 MG tablet 2021 at Unknown time  Yes Yes   Sig: Take 10 mg by mouth daily      Facility-Administered Medications: None       Past Medical History:   Diagnosis Date    BPH (benign prostatic hyperplasia)     Diabetes mellitus ([facility] [address] )        Past Surgical History:   Procedure Laterality Date    CHOLECYSTECTOMY         History reviewed  No pertinent family history  I have reviewed and agree with the history as documented  E-Cigarette/Vaping    E-Cigarette Use Never User      E-Cigarette/Vaping Substances    Nicotine No     THC No     CBD No     Flavoring No     Other No     Unknown No      Social History     Tobacco Use    Smoking status: Never Smoker    Smokeless tobacco: Never Used   Substance Use Topics    Alcohol use: No    Drug use: No       Review of Systems   Constitutional: Negative for activity change, appetite change, chills and fever  HENT: Negative for congestion, postnasal drip, rhinorrhea, sinus pressure, sinus pain, sore throat and tinnitus  Eyes: Negative for photophobia and visual disturbance  Respiratory: Positive for cough and shortness of breath  Negative for chest tightness  Cardiovascular: Negative for chest pain, palpitations and PND  Gastrointestinal: Negative for abdominal pain, constipation, diarrhea, nausea and vomiting  Genitourinary: Negative for difficulty urinating, dysuria, flank pain, frequency and urgency     Musculoskeletal: Negative for back pain, gait problem, neck pain and neck stiffness  Skin: Negative for pallor and rash  Allergic/Immunologic: Negative for environmental allergies and food allergies  Neurological: Negative for dizziness, weakness, numbness and headaches  Psychiatric/Behavioral: Negative for confusion  All other systems reviewed and are negative  Physical Exam  Physical Exam  Vitals signs and nursing note reviewed  Constitutional:       General: He is awake  Appearance: Normal appearance  He is well-developed  He is not ill-appearing, toxic-appearing or diaphoretic  Comments: BP (!) 154/107 (BP Location: Right arm)   Pulse (!) 117   Temp 97 8 °F (36 6 °C) (Oral)   Resp 20   Ht 5' 10" (1 778 m)   Wt 124 kg (274 lb 0 5 oz)   SpO2 97%   BMI 39 32 kg/m²      HENT:      Head: Normocephalic and atraumatic  Right Ear: Hearing and external ear normal  No decreased hearing noted  No drainage, swelling or tenderness  No mastoid tenderness  Left Ear: Hearing and external ear normal  No decreased hearing noted  No drainage, swelling or tenderness  No mastoid tenderness  Nose: Nose normal       Mouth/Throat:      Lips: Pink  Mouth: Mucous membranes are moist       Pharynx: Oropharynx is clear  Uvula midline  Eyes:      General: Lids are normal  Vision grossly intact  Right eye: No discharge  Left eye: No discharge  Extraocular Movements: Extraocular movements intact  Conjunctiva/sclera: Conjunctivae normal       Pupils: Pupils are equal, round, and reactive to light  Neck:      Musculoskeletal: Full passive range of motion without pain, normal range of motion and neck supple  Normal range of motion  No neck rigidity, spinous process tenderness or muscular tenderness  Vascular: No JVD  Trachea: Trachea and phonation normal  No tracheal tenderness or tracheal deviation  Cardiovascular:      Rate and Rhythm: Normal rate and regular rhythm        Pulses: Normal pulses  Radial pulses are 2+ on the right side and 2+ on the left side  Posterior tibial pulses are 2+ on the right side and 2+ on the left side  Heart sounds: Normal heart sounds  Pulmonary:      Effort: Pulmonary effort is normal       Breath sounds: Normal breath sounds  No stridor  No decreased breath sounds, wheezing, rhonchi or rales  Chest:      Chest wall: No tenderness  Abdominal:      General: Abdomen is flat  Bowel sounds are normal  There is no distension  Palpations: Abdomen is soft  Abdomen is not rigid  Tenderness: There is no abdominal tenderness  There is no guarding or rebound  Musculoskeletal: Normal range of motion  Right lower leg: He exhibits swelling  Left lower leg: He exhibits swelling  Comments: Bilateral lower extremity pitting edema +3 to tibial plateau   Lymphadenopathy:      Head:      Right side of head: No submental, submandibular, tonsillar, preauricular, posterior auricular or occipital adenopathy  Left side of head: No submental, submandibular, tonsillar, preauricular, posterior auricular or occipital adenopathy  Cervical: No cervical adenopathy  Right cervical: No superficial, deep or posterior cervical adenopathy  Left cervical: No superficial, deep or posterior cervical adenopathy  Skin:     General: Skin is warm  Capillary Refill: Capillary refill takes less than 2 seconds  Neurological:      General: No focal deficit present  Mental Status: He is alert and oriented to person, place, and time  GCS: GCS eye subscore is 4  GCS verbal subscore is 5  GCS motor subscore is 6  Sensory: No sensory deficit  Deep Tendon Reflexes: Reflexes are normal and symmetric  Reflex Scores:       Patellar reflexes are 2+ on the right side and 2+ on the left side    Psychiatric:         Mood and Affect: Mood normal          Speech: Speech normal          Behavior: Behavior normal  Behavior is cooperative  Thought Content: Thought content normal          Judgment: Judgment normal          Vital Signs  ED Triage Vitals [04/26/21 2357]   Temperature Pulse Respirations Blood Pressure SpO2   97 8 °F (36 6 °C) (!) 117 20 (!) 154/107 97 %      Temp Source Heart Rate Source Patient Position - Orthostatic VS BP Location FiO2 (%)   Oral Monitor Sitting Right arm --      Pain Score       2           Vitals:    04/26/21 2357 04/27/21 0057   BP: (!) 154/107 132/86   Pulse: (!) 117 105   Patient Position - Orthostatic VS: Sitting Sitting         Visual Acuity      ED Medications  Medications - No data to display    Diagnostic Studies  Results Reviewed     Procedure Component Value Units Date/Time    Novel Coronavirus (Covid-19),PCR [facility] - 2 Hour Stat [[id number]]  (Normal) Collected: 04/27/21 0052    Lab Status: Final result Specimen: Nasopharyngeal Swab Updated: 04/27/21 0155     SARS-CoV-2 Negative    Narrative: The specimen collection materials, transport medium, and/or testing methodology utilized in the production of these test results have been proven to be reliable in a limited validation with an abbreviated program under the Emergency Utilization Authorization provided by the FDA  Testing reported as "Presumptive positive" will be confirmed with secondary testing to ensure result accuracy  Clinical caution and judgement should be used with the interpretation of these results with consideration of the clinical impression and other laboratory testing  Testing reported as "Positive" or "Negative" has been proven to be accurate according to standard laboratory validation requirements  All testing is performed with control materials showing appropriate reactivity at standard intervals        Lipase [[id number]]  (Normal) Collected: 04/27/21 0052    Lab Status: Final result Specimen: Blood from Arm, Right Updated: 04/27/21 0127     Lipase 145 u/L     NT-BNP PRO [[id number]]  (Abnormal) Collected: 04/27/21 0052    Lab Status: Final result Specimen: Blood from Arm, Right Updated: 04/27/21 0127     NT-proBNP 1,894 pg/mL     Troponin I [[id number]]  (Normal) Collected: 04/27/21 0052    Lab Status: Final result Specimen: Blood from Arm, Right Updated: 04/27/21 0125     Troponin I 0 03 ng/mL     Comprehensive metabolic panel [[id number]]  (Abnormal) Collected: 04/27/21 0052    Lab Status: Final result Specimen: Blood from Arm, Right Updated: 04/27/21 0121     Sodium 137 mmol/L      Potassium 5 1 mmol/L      Chloride 104 mmol/L      CO2 24 mmol/L      ANION GAP 9 mmol/L      BUN 14 mg/dL      Creatinine 1 09 mg/dL      Glucose 331 mg/dL      Calcium 8 5 mg/dL      AST 30 U/L      ALT 41 U/L      Alkaline Phosphatase 148 U/L      Total Protein 7 1 g/dL      Albumin 3 8 g/dL      Total Bilirubin 0 50 mg/dL      eGFR 74 ml/min/1 73sq m     Narrative:      [facility] guidelines for Chronic Kidney Disease (CKD):     Stage 1 with normal or high GFR (GFR > 90 mL/min/1 73 square meters)    Stage 2 Mild CKD (GFR = 60-89 mL/min/1 73 square meters)    Stage 3A Moderate CKD (GFR = 45-59 mL/min/1 73 square meters)    Stage 3B Moderate CKD (GFR = 30-44 mL/min/1 73 square meters)    Stage 4 Severe CKD (GFR = 15-29 mL/min/1 73 square meters)    Stage 5 End Stage CKD (GFR <15 mL/min/1 73 square meters)  Note: GFR calculation is accurate only with a steady state creatinine    D-Dimer [[id number]]  (Normal) Collected: 04/27/21 0052    Lab Status: Final result Specimen: Blood from Arm, Right Updated: 04/27/21 0119     D-Dimer, Quant 0 45 ug/ml FEU     Protime-INR [[id number]]  (Normal) Collected: 04/27/21 0052    Lab Status: Final result Specimen: Blood from Arm, Right Updated: 04/27/21 0116     Protime 13 2 seconds      INR 0 99    APTT [[id number]]  (Normal) Collected: 04/27/21 0052    Lab Status: Final result Specimen: Blood from Arm, Right Updated: 04/27/21 0116     PTT 26 seconds     CBC and differential [[id number]] Collected: 04/27/21 0052    Lab Status: Final result Specimen: Blood from Arm, Right Updated: 04/27/21 0107     WBC 5 48 Thousand/uL      RBC 5 21 Million/uL      Hemoglobin 15 2 g/dL      Hematocrit 46 4 %      MCV 89 fL      MCH 29 2 pg      MCHC 32 8 g/dL      RDW 12 3 %      MPV 9 7 fL      Platelets 807 Thousands/uL      nRBC 0 /100 WBCs      Neutrophils Relative 57 %      Immat GRANS % 1 %      Lymphocytes Relative 27 %      Monocytes Relative 11 %      Eosinophils Relative 3 %      Basophils Relative 1 %      Neutrophils Absolute 3 17 Thousands/µL      Immature Grans Absolute 0 03 Thousand/uL      Lymphocytes Absolute 1 46 Thousands/µL      Monocytes Absolute 0 61 Thousand/µL      Eosinophils Absolute 0 14 Thousand/µL      Basophils Absolute 0 07 Thousands/µL                  XR chest 1 view portable   ED Interpretation by Elisabeth Liu PA-C (04/27 0202)   No acute cardiopulmonary disease on initial read                 Procedures  ECG 12 Lead Documentation Only    Date/Time: 4/27/2021 1:14 AM  Performed by: Elisabeth Liu PA-C  Authorized by: Elisabeth Liu PA-C     Indications / Diagnosis:  Shortness of breath  ECG reviewed by me, the ED Provider: yes    Patient location:  ED  Previous ECG:     Previous ECG:  Compared to current    Similarity:  No change    Comparison to cardiac monitor: Yes    Interpretation:     Interpretation: normal    Rate:     ECG rate:  115    ECG rate assessment: tachycardic    Rhythm:     Rhythm: sinus tachycardia    Ectopy:     Ectopy: PVCs      PVCs:  Infrequent  QRS:     QRS axis:  Left    QRS intervals:  Normal  Conduction:     Conduction: normal    ST segments:     ST segments:  Normal  T waves:     T waves: normal               ED Course  ED Course as of Apr 27 0439   Tue Apr 27, 2021   0123 Doubt PE; no pleuritic chest pain   D-Dimer, Quant: 0 45   0212 NT-proBNP(!): 1,894                     PERC Rule for PE      Most Recent Value   PERC Rule for PE Age >=50  1 Filed at: 04/27/2021 0435   HR >=100  1 Filed at: 04/27/2021 0435   O2 Sat on room air < 95%  0 Filed at: 04/27/2021 0435   History of PE or DVT  0 Filed at: 04/27/2021 0435   Recent trauma or surgery  0 Filed at: 04/27/2021 0435   Hemoptysis  0 Filed at: 04/27/2021 0435   Exogenous estrogen  0 Filed at: 04/27/2021 0435   Unilateral leg swelling  0 Filed at: 04/27/2021 0435   PERC Rule for PE Results  2 Filed at: 04/27/2021 0435              SBIRT 22yo+      Most Recent Value   SBIRT (23 yo +)   In order to provide better care to our patients, we are screening all of our patients for alcohol and drug use  Would it be okay to ask you these screening questions?   Unable to answer at this time Filed at: 04/27/2021 0000            Jerrell' Criteria for DVT      Most Recent Value   Wells' Criteria for DVT   Active cancer Treatment or palliation within 6 months  0 Filed at: 04/27/2021 5385   Bedridden recently >3 days or major surgery within 12 weeks  0 Filed at: 04/27/2021 0435   Calf swelling >3 cm compared to the other leg  0 Filed at: 04/27/2021 0435   Entire leg swollen  0 Filed at: 04/27/2021 0435   Collateral (nonvaricose) superficial veins present  0 Filed at: 04/27/2021 0435   Localized tenderness along the deep venous system  0 Filed at: 04/27/2021 0435   Pitting edema, confined to symptomatic leg  0 Filed at: 04/27/2021 0435   Paralysis, paresis, or recent plaster immobilization of the lower extremity  0 Filed at: 04/27/2021 0435   Previously documented DVT  0 Filed at: 04/27/2021 0522   Alternative diagnosis to DVT as likely or more likely  0 Filed at: 04/27/2021 0435   Jerrell DVT Critera Score  0 Filed at: 04/27/2021 0435              MDM  Number of Diagnoses or Management Options  Elevated brain natriuretic peptide (BNP) level: new and does not require workup  Shortness of breath on exertion: new and does not require workup     Amount and/or Complexity of Data Reviewed  Clinical lab tests: ordered and reviewed  Tests in the radiology section of CPT®: ordered and reviewed  Review and summarize past medical records: yes  Independent visualization of images, tracings, or specimens: yes    Risk of Complications, Morbidity, and/or Mortality  Presenting problems: moderate  Diagnostic procedures: moderate  Management options: low    Patient Progress  Patient progress: stable     Patient is a 52-year-old male with history of diabetes and cholecystectomy that presents [place] [address],[facility] department with shortness of breath with exertion for 1 week  Patient denies associated symptomatology  Patient had believed that his current symptoms stemming from allergies, and had taken Ventolin inhaler and Benadryl mild improvement  Patient hemodynamically stable and afebrile  ECG with sinus tachycardia with infrequent PVCs  Negative troponin; bnp 1,894  Wells 0, perc 2, negative D-dimer with PE not likely  Chest x-ray no acute cardiopulmonary disease on initial read  Patient hyperglycemic; history of diabetes mellitus  Normal kidney function  No leukocytosis, no banding  Negative COVID  Patient verbalized mild relief of Ventolin inhaler and antihistamines; bilateral pitting edema +3 lower extremities to tibial plateau, no evidence of venous stasis; will have patient follow-up with Cardiology to be evaluated for possible heart failure ideology  Patient denied pain throughout the entire ED visit  Follow-up with cardiology; patient states that he has cardiologist in the [facility]  Follow-up with PCP  Follow up emergency department symptoms persist or exacerbate  Patient demonstrates verbal understanding all clinical laboratory imaging findings, discharge instructions, follow-up verbalized agreement current treatment plan      Disposition  Final diagnoses:   Shortness of breath on exertion   Elevated brain natriuretic peptide (BNP) level     Time reflects when diagnosis was documented in both MDM as applicable and the Disposition within this note     Time User Action Codes Description Comment    4/27/2021  2:13 AM Mychalma Hock Add [R06 02] Shortness of breath on exertion     4/27/2021  2:13 AM Brian Hock Add [R79 89] Elevated brain natriuretic peptide (BNP) level       ED Disposition     ED Disposition Condition Date/Time Comment    Discharge Stable Tue Apr 27, 2021  2:12 AM Enrrique Aaron discharge to home/self care              Follow-up Information     Follow up With Specialties Details Why Contact Info Additional [facility] Family Medicine Call in 1 week  [address] [address]  [address] , [place], [place] [facility] [place], [place], [address] Emergency Department Emergency Medicine Go to  As needed [address] [facility] [address] [facility] Emergency Department, [address], [facility] [place], [place], [address] [facility] Cardiology [facility] Cardiology  For elevated BNP level [address] [facility]  [address] [facility] Cardiology [facility], [address] [place] [address], [facility] [address]          Discharge Medication List as of 4/27/2021  2:15 AM      CONTINUE these medications which have NOT CHANGED    Details   alfuzosin (UROXATRAL) 10 mg 24 hr tablet Take 10 mg by mouth daily, Historical Med      cyclobenzaprine (FLEXERIL) 10 mg tablet TAKE 1 TABLET BY MOUTH EVERYDAY AT BEDTIME, Normal      dutasteride (Avodart) 0 5 mg capsule Take 1 capsule by mouth daily, Starting Tue 6/24/2014, Historical Med      fluticasone (FLOVENT HFA) 110 MCG/ACT inhaler Inhale 2 puffs 2 (two) times a day Rinse mouth after use , Historical Med      fluticasone (VERAMYST) 27 5 MCG/SPRAY nasal spray 2 sprays into each nostril daily, Historical Med      metFORMIN (GLUCOPHAGE) 500 mg tablet Take 1,000 mg by mouth 2 (two) times a day with meals , Historical Med      rosuvastatin (CRESTOR) 10 MG tablet Take 10 mg by mouth daily, Historical Med           No discharge procedures on file      PDMP Review     None          ED Provider  Electronically Signed by           Charlotte Marshall PA-C  04/27/21 0842

## 2021-04-28 LAB
ATRIAL RATE: 115 BPM
P AXIS: 16 DEGREES
PR INTERVAL: 168 MS
QRS AXIS: -68 DEGREES
QRSD INTERVAL: 92 MS
QT INTERVAL: 300 MS
QTC INTERVAL: 415 MS
T WAVE AXIS: 42 DEGREES
VENTRICULAR RATE: 115 BPM

## 2021-04-28 PROCEDURE — 93010 ELECTROCARDIOGRAM REPORT: CPT | Performed by: INTERNAL MEDICINE

## 2021-08-13 DIAGNOSIS — G89.29 CHRONIC RIGHT-SIDED LOW BACK PAIN WITH RIGHT-SIDED SCIATICA: ICD-10-CM

## 2021-08-13 DIAGNOSIS — M54.41 CHRONIC RIGHT-SIDED LOW BACK PAIN WITH RIGHT-SIDED SCIATICA: ICD-10-CM

## 2021-08-13 RX ORDER — CYCLOBENZAPRINE HCL 10 MG
10 TABLET ORAL
Qty: 30 TABLET | Refills: 1 | Status: SHIPPED | OUTPATIENT
Start: 2021-08-13 | End: 2021-12-08

## 2021-12-08 DIAGNOSIS — M54.41 CHRONIC RIGHT-SIDED LOW BACK PAIN WITH RIGHT-SIDED SCIATICA: ICD-10-CM

## 2021-12-08 DIAGNOSIS — G89.29 CHRONIC RIGHT-SIDED LOW BACK PAIN WITH RIGHT-SIDED SCIATICA: ICD-10-CM

## 2021-12-08 RX ORDER — CYCLOBENZAPRINE HCL 10 MG
TABLET ORAL
Qty: 30 TABLET | Refills: 0 | Status: SHIPPED | OUTPATIENT
Start: 2021-12-08

## 2021-12-09 ENCOUNTER — TELEPHONE (OUTPATIENT)
Dept: FAMILY MEDICINE CLINIC | Facility: CLINIC | Age: 61
End: 2021-12-09

## 2021-12-09 NOTE — TELEPHONE ENCOUNTER
Patient states he has been going to   [facility] in [address]  Please remove Dr Cori Panchal as PCP

## 2021-12-16 DIAGNOSIS — M54.41 CHRONIC RIGHT-SIDED LOW BACK PAIN WITH RIGHT-SIDED SCIATICA: ICD-10-CM

## 2021-12-16 DIAGNOSIS — G89.29 CHRONIC RIGHT-SIDED LOW BACK PAIN WITH RIGHT-SIDED SCIATICA: ICD-10-CM

## 2021-12-16 RX ORDER — CYCLOBENZAPRINE HCL 10 MG
TABLET ORAL
Qty: 30 TABLET | Refills: 0 | OUTPATIENT
Start: 2021-12-16

## 2022-02-01 NOTE — TELEPHONE ENCOUNTER
02/01/22 4:47 PM     Thank you for your request  Your request has been received, reviewed, and the patient chart updated  The PCP has successfully been removed with a patient attribution note  Please remind staff to not remove PCP at office level for this scenario; [facility] Department will remove  This message will now be completed      Thank you  Carlos Enrique Duque

## 2022-11-09 ENCOUNTER — HOSPITAL ENCOUNTER (EMERGENCY)
Facility: HOSPITAL | Age: 62
Discharge: HOME | End: 2022-11-09
Attending: EMERGENCY MEDICINE

## 2022-11-09 ENCOUNTER — APPOINTMENT (EMERGENCY)
Dept: RADIOLOGY | Facility: HOSPITAL | Age: 62
End: 2022-11-09

## 2022-11-09 VITALS
RESPIRATION RATE: 18 BRPM | BODY MASS INDEX: 38.63 KG/M2 | DIASTOLIC BLOOD PRESSURE: 83 MMHG | HEIGHT: 70 IN | SYSTOLIC BLOOD PRESSURE: 123 MMHG | TEMPERATURE: 97.9 F | OXYGEN SATURATION: 95 % | HEART RATE: 97 BPM | WEIGHT: 269.84 LBS

## 2022-11-09 DIAGNOSIS — I48.91 ATRIAL FIBRILLATION (HCC): ICD-10-CM

## 2022-11-09 DIAGNOSIS — Z45.02 ICD (IMPLANTABLE CARDIOVERTER-DEFIBRILLATOR) DISCHARGE: Primary | ICD-10-CM

## 2022-11-09 LAB
2HR DELTA HS TROPONIN: 1 NG/L
ALBUMIN SERPL BCP-MCNC: 4.1 G/DL (ref 3.5–5)
ALP SERPL-CCNC: 77 U/L (ref 34–104)
ALT SERPL W P-5'-P-CCNC: 12 U/L (ref 7–52)
ANION GAP SERPL CALCULATED.3IONS-SCNC: 11 MMOL/L (ref 4–13)
AST SERPL W P-5'-P-CCNC: 12 U/L (ref 13–39)
ATRIAL RATE: 101 BPM
BASOPHILS # BLD AUTO: 0.08 THOUSANDS/ÂΜL (ref 0–0.1)
BASOPHILS NFR BLD AUTO: 2 % (ref 0–1)
BILIRUB SERPL-MCNC: 0.31 MG/DL (ref 0.2–1)
BNP SERPL-MCNC: 194 PG/ML (ref 0–100)
BUN SERPL-MCNC: 20 MG/DL (ref 5–25)
CALCIUM SERPL-MCNC: 9.2 MG/DL (ref 8.4–10.2)
CARDIAC TROPONIN I PNL SERPL HS: 2 NG/L
CARDIAC TROPONIN I PNL SERPL HS: 3 NG/L
CHLORIDE SERPL-SCNC: 103 MMOL/L (ref 96–108)
CO2 SERPL-SCNC: 23 MMOL/L (ref 21–32)
CREAT SERPL-MCNC: 1.06 MG/DL (ref 0.6–1.3)
EOSINOPHIL # BLD AUTO: 0.15 THOUSAND/ÂΜL (ref 0–0.61)
EOSINOPHIL NFR BLD AUTO: 4 % (ref 0–6)
ERYTHROCYTE [DISTWIDTH] IN BLOOD BY AUTOMATED COUNT: 13.2 % (ref 11.6–15.1)
GFR SERPL CREATININE-BSD FRML MDRD: 75 ML/MIN/1.73SQ M
GLUCOSE SERPL-MCNC: 154 MG/DL (ref 65–140)
HCT VFR BLD AUTO: 44.6 % (ref 36.5–49.3)
HGB BLD-MCNC: 14.9 G/DL (ref 12–17)
IMM GRANULOCYTES # BLD AUTO: 0.03 THOUSAND/UL (ref 0–0.2)
IMM GRANULOCYTES NFR BLD AUTO: 1 % (ref 0–2)
LYMPHOCYTES # BLD AUTO: 1.18 THOUSANDS/ÂΜL (ref 0.6–4.47)
LYMPHOCYTES NFR BLD AUTO: 30 % (ref 14–44)
MAGNESIUM SERPL-MCNC: 1.7 MG/DL (ref 1.9–2.7)
MCH RBC QN AUTO: 29.2 PG (ref 26.8–34.3)
MCHC RBC AUTO-ENTMCNC: 33.4 G/DL (ref 31.4–37.4)
MCV RBC AUTO: 88 FL (ref 82–98)
MONOCYTES # BLD AUTO: 0.46 THOUSAND/ÂΜL (ref 0.17–1.22)
MONOCYTES NFR BLD AUTO: 12 % (ref 4–12)
NEUTROPHILS # BLD AUTO: 1.98 THOUSANDS/ÂΜL (ref 1.85–7.62)
NEUTS SEG NFR BLD AUTO: 51 % (ref 43–75)
NRBC BLD AUTO-RTO: 0 /100 WBCS
P AXIS: 16 DEGREES
PHOSPHATE SERPL-MCNC: 3.9 MG/DL (ref 2.3–4.1)
PLATELET # BLD AUTO: 271 THOUSANDS/UL (ref 149–390)
PMV BLD AUTO: 9.3 FL (ref 8.9–12.7)
POTASSIUM SERPL-SCNC: 3.7 MMOL/L (ref 3.5–5.3)
PR INTERVAL: 168 MS
PROT SERPL-MCNC: 7 G/DL (ref 6.4–8.4)
QRS AXIS: 147 DEGREES
QRSD INTERVAL: 94 MS
QT INTERVAL: 334 MS
QTC INTERVAL: 433 MS
RBC # BLD AUTO: 5.1 MILLION/UL (ref 3.88–5.62)
SODIUM SERPL-SCNC: 137 MMOL/L (ref 135–147)
T WAVE AXIS: -12 DEGREES
VENTRICULAR RATE: 101 BPM
WBC # BLD AUTO: 3.88 THOUSAND/UL (ref 4.31–10.16)

## 2022-11-09 RX ORDER — MAGNESIUM SULFATE HEPTAHYDRATE 40 MG/ML
2 INJECTION, SOLUTION INTRAVENOUS ONCE
Status: COMPLETED | OUTPATIENT
Start: 2022-11-09 | End: 2022-11-09

## 2022-11-09 RX ORDER — POTASSIUM CHLORIDE 20 MEQ/1
40 TABLET, EXTENDED RELEASE ORAL ONCE
Status: COMPLETED | OUTPATIENT
Start: 2022-11-09 | End: 2022-11-09

## 2022-11-09 RX ORDER — TADALAFIL 20 MG/1
20 TABLET ORAL
COMMUNITY
Start: 2017-05-19

## 2022-11-09 RX ORDER — METOPROLOL SUCCINATE 50 MG/1
0.5 TABLET, EXTENDED RELEASE ORAL
COMMUNITY

## 2022-11-09 RX ORDER — ASPIRIN 81 MG/1
81 TABLET ORAL
COMMUNITY

## 2022-11-09 RX ORDER — FUROSEMIDE 20 MG/1
2 TABLET ORAL
COMMUNITY

## 2022-11-09 RX ORDER — CETIRIZINE HYDROCHLORIDE 10 MG/1
1 TABLET ORAL
COMMUNITY

## 2022-11-09 RX ORDER — SILDENAFIL CITRATE 20 MG/1
20 TABLET ORAL
COMMUNITY
Start: 2019-02-28

## 2022-11-09 RX ORDER — LOSARTAN POTASSIUM 50 MG/1
50 TABLET ORAL
COMMUNITY

## 2022-11-09 RX ORDER — SPIRONOLACTONE 25 MG/1
25 TABLET ORAL DAILY
COMMUNITY

## 2022-11-09 RX ADMIN — MAGNESIUM SULFATE HEPTAHYDRATE 2 G: 40 INJECTION, SOLUTION INTRAVENOUS at 13:51

## 2022-11-09 RX ADMIN — POTASSIUM CHLORIDE 40 MEQ: 1500 TABLET, EXTENDED RELEASE ORAL at 13:52

## 2022-11-09 NOTE — DISCHARGE INSTRUCTIONS
Please return to the ER at any time if you have any concerning symptoms or wish to seek treatment  Please follow up with your cardiologist tomorrow  Please let him know that we did not start blood thinners or adjust your medications for afib at your request     It is advised that you do not drive until advised by your cardiologist given your risk for abnormal rhythms which may cause you to pass out or get defibrillated while driving, which could lead to an accident

## 2022-11-09 NOTE — ED PROVIDER NOTES
History  Chief Complaint   Patient presents with   • Pacemaker Problem     Patient states his defibrillator went off about 20 minutes ago  Mr  Abbey Eckert is a 65 yom with history of non-ischemic cardiomyopathy, EF 24%, s/p AICD insertion, DM type 2, and hyperlipidemia who presents after his AICD fired this morning, has never fired before  States that he had done " a few pushups" after his 16 hour overnight shift this morning, went to the bathroom, has brief episode of lightheadedness and shortness of breath prior to his AICD firing  Has immediate resol          Prior to Admission Medications   Prescriptions Last Dose Informant Patient Reported? Taking? AMLODIPINE BESYLATE PO 2022 at Unknown time  Yes Yes   Empagliflozin 25 MG TABS 2022 at Unknown time  Yes Yes   Sig: Take 1 tablet by mouth   alfuzosin (UROXATRAL) 10 mg 24 hr tablet 2022 at Unknown time Self Yes Yes   Sig: Take 10 mg by mouth daily   aspirin (ECOTRIN LOW STRENGTH) 81 mg EC tablet 2022 at Unknown time  Yes Yes   Sig: Take 81 mg by mouth   cetirizine (ZyrTEC) 10 mg tablet More than a month at Unknown time  Yes No   Sig: Take 1 tablet by mouth   cyclobenzaprine (FLEXERIL) 10 mg tablet Not Taking at Unknown time  No No   Sig: TAKE 1 TABLET BY MOUTH DAILY AT BEDTIME   Patient not taking: Reported on 2022   dutasteride (AVODART) 0 5 mg capsule 2022 at Unknown time Self Yes Yes   Sig: Take 1 capsule by mouth daily   fluticasone (FLOVENT HFA) 110 MCG/ACT inhaler Not Taking at Unknown time  Yes No   Sig: Inhale 2 puffs 2 (two) times a day Rinse mouth after use     Patient not taking: Reported on 2022   fluticasone (VERAMYST) 27 5 MCG/SPRAY nasal spray Not Taking at Unknown time  Yes No   Si sprays into each nostril daily   Patient not taking: Reported on 2022   furosemide (LASIX) 20 mg tablet 2022 at Unknown time  Yes Yes   Sig: Take 2 tablets by mouth   losartan (COZAAR) 50 mg tablet 2022 at Unknown time  Yes Yes   Sig: Take 50 mg by mouth   metFORMIN (GLUCOPHAGE) 500 mg tablet 11/8/2022 at Unknown time Self Yes Yes   Sig: Take 1,000 mg by mouth 2 (two) times a day with meals    metoprolol succinate (TOPROL-XL) 50 mg 24 hr tablet 11/9/2022 at Unknown time  Yes Yes   Sig: Take 0 5 tablets by mouth   rosuvastatin (CRESTOR) 10 MG tablet Not Taking at Unknown time  Yes No   Sig: Take 10 mg by mouth daily   Patient not taking: Reported on 11/9/2022   sildenafil (REVATIO) 20 mg tablet Not Taking at Unknown time  Yes No   Sig: Take 20 mg by mouth   Patient not taking: Reported on 11/9/2022   spironolactone (ALDACTONE) 25 mg tablet 11/8/2022 at Unknown time  Yes Yes   Sig: Take 25 mg by mouth daily   tadalafil (CIALIS) 20 MG tablet Not Taking at Unknown time  Yes No   Sig: Take 20 mg by mouth   Patient not taking: Reported on 11/9/2022      Facility-Administered Medications: None       Past Medical History:   Diagnosis Date   • BPH (benign prostatic hyperplasia)    • Diabetes mellitus ([facility] [address] )        Past Surgical History:   Procedure Laterality Date   • CHOLECYSTECTOMY         History reviewed  No pertinent family history  I have reviewed and agree with the history as documented  E-Cigarette/Vaping   • E-Cigarette Use Never User      E-Cigarette/Vaping Substances   • Nicotine No    • THC No    • CBD No    • Flavoring No    • Other No    • Unknown No      Social History     Tobacco Use   • Smoking status: Never Smoker   • Smokeless tobacco: Never Used   Vaping Use   • Vaping Use: Never used   Substance Use Topics   • Alcohol use: No   • Drug use: No        Review of Systems   Constitutional: Negative  Negative for appetite change, chills, diaphoresis, fatigue, fever and unexpected weight change  Has lost 28 pounds in past 3 months due to diet and exercise  HENT: Negative  Negative for congestion, rhinorrhea, sneezing, sore throat and trouble swallowing  Eyes: Negative    Negative for photophobia and visual disturbance  Respiratory: Positive for shortness of breath  Cardiovascular: Positive for leg swelling  Negative for chest pain and palpitations  AICD fired this morning  Gastrointestinal: Negative  Negative for abdominal distention, abdominal pain, constipation, diarrhea, nausea and vomiting  Endocrine: Negative  Negative for polyphagia and polyuria  Genitourinary: Negative  Negative for decreased urine volume, dysuria and hematuria  Musculoskeletal: Negative  Negative for arthralgias, back pain, gait problem, myalgias and neck pain  Skin: Positive for wound  Negative for pallor and rash  Chronic ulcer on left shin after weed whacker injury 4 months ago, states almost fully healed, non-painful, does not bleed or have drainage  Allergic/Immunologic: Negative  Negative for immunocompromised state  Neurological: Positive for light-headedness  Negative for dizziness, syncope, weakness, numbness and headaches  Hematological: Negative  Negative for adenopathy  Does not bruise/bleed easily  Psychiatric/Behavioral: Negative for confusion  All other systems reviewed and are negative  Physical Exam  ED Triage Vitals   Temperature Pulse Respirations Blood Pressure SpO2   11/09/22 1218 11/09/22 1217 11/09/22 1217 11/09/22 1217 11/09/22 1217   97 9 °F (36 6 °C) 94 18 110/68 98 %      Temp Source Heart Rate Source Patient Position - Orthostatic VS BP Location FiO2 (%)   11/09/22 1218 11/09/22 1217 11/09/22 1217 11/09/22 1217 --   Oral Monitor Lying Right arm       Pain Score       11/09/22 1218       No Pain             Orthostatic Vital Signs  Vitals:    11/09/22 1217   BP: 110/68   Pulse: 94   Patient Position - Orthostatic VS: Lying       Physical Exam  Vitals and nursing note reviewed  Exam conducted with a chaperone present  Constitutional:       General: He is not in acute distress  Appearance: Normal appearance  He is not ill-appearing or diaphoretic  HENT:      Head: Normocephalic and atraumatic  Right Ear: External ear normal       Left Ear: External ear normal       Nose: Nose normal  No congestion or rhinorrhea  Mouth/Throat:      Mouth: Mucous membranes are moist       Pharynx: Oropharynx is clear  No oropharyngeal exudate or posterior oropharyngeal erythema  Eyes:      General:         Right eye: No discharge  Extraocular Movements: Extraocular movements intact  Conjunctiva/sclera: Conjunctivae normal    Cardiovascular:      Rate and Rhythm: Normal rate and regular rhythm  Occasional extrasystoles are present  Pulses:           Radial pulses are 2+ on the right side and 2+ on the left side  Dorsalis pedis pulses are 1+ on the right side and 1+ on the left side  Heart sounds: Normal heart sounds  Heart sounds not distant  No murmur heard  No friction rub  No gallop  Pulmonary:      Effort: Pulmonary effort is normal  No respiratory distress  Breath sounds: Normal breath sounds  No wheezing, rhonchi or rales  Chest:      Chest wall: No tenderness  Abdominal:      General: Abdomen is protuberant  Bowel sounds are normal  There is no distension  Palpations: Abdomen is soft  Tenderness: There is no abdominal tenderness  There is no guarding or rebound  Musculoskeletal:         General: Normal range of motion  Cervical back: Normal range of motion and neck supple  No tenderness  Right lower leg: 3+ Edema present  Left lower leg: 3+ Edema present  Lymphadenopathy:      Cervical: No cervical adenopathy  Skin:     General: Skin is warm and dry  Capillary Refill: Capillary refill takes less than 2 seconds  Coloration: Skin is not pale  Findings: No bruising or rash  Comments: 2cm ulcer on anterior left shin without notable exudate, bleeding, or surrounding erythema or warmth  Neurological:      General: No focal deficit present        Mental Status: He is alert and oriented to person, place, and time  ED Medications  Medications - No data to display    Diagnostic Studies  Results Reviewed     Procedure Component Value Units Date/Time    Phosphorus [[id number]] Collected: 11/09/22 1143    Lab Status: In process Specimen: Blood from Arm, Left Updated: 11/09/22 1254    Magnesium [[id number]] Collected: 11/09/22 1143    Lab Status:  In process Specimen: Blood from Arm, Left Updated: 11/09/22 1254    B-Type Natriuretic Peptide(BNP), AN, CA, EA Campuses Only [[id number]]     Lab Status: No result Specimen: Blood     HS Troponin I 2hr [[id number]]     Lab Status: No result Specimen: Blood     HS Troponin I 4hr [[id number]]     Lab Status: No result Specimen: Blood     HS Troponin 0hr (reflex protocol) [[id number]]  (Normal) Collected: 11/09/22 1143    Lab Status: Final result Specimen: Blood from Arm, Left Updated: 11/09/22 1214     hs TnI 0hr 2 ng/L     Comprehensive metabolic panel [[id number]]  (Abnormal) Collected: 11/09/22 1143    Lab Status: Final result Specimen: Blood from Arm, Left Updated: 11/09/22 1207     Sodium 137 mmol/L      Potassium 3 7 mmol/L      Chloride 103 mmol/L      CO2 23 mmol/L      ANION GAP 11 mmol/L      BUN 20 mg/dL      Creatinine 1 06 mg/dL      Glucose 154 mg/dL      Calcium 9 2 mg/dL      AST 12 U/L      ALT 12 U/L      Alkaline Phosphatase 77 U/L      Total Protein 7 0 g/dL      Albumin 4 1 g/dL      Total Bilirubin 0 31 mg/dL      eGFR 75 ml/min/1 73sq m     Narrative:      Case guidelines for Chronic Kidney Disease (CKD):   •  Stage 1 with normal or high GFR (GFR > 90 mL/min/1 73 square meters)  •  Stage 2 Mild CKD (GFR = 60-89 mL/min/1 73 square meters)  •  Stage 3A Moderate CKD (GFR = 45-59 mL/min/1 73 square meters)  •  Stage 3B Moderate CKD (GFR = 30-44 mL/min/1 73 square meters)  •  Stage 4 Severe CKD (GFR = 15-29 mL/min/1 73 square meters)  •  Stage 5 End Stage CKD (GFR <15 mL/min/1 73 square meters)  Note: GFR calculation is accurate only with a steady state creatinine    CBC and differential [[id number]]  (Abnormal) Collected: 11/09/22 1143    Lab Status: Final result Specimen: Blood from Arm, Left Updated: 11/09/22 1158     WBC 3 88 Thousand/uL      RBC 5 10 Million/uL      Hemoglobin 14 9 g/dL      Hematocrit 44 6 %      MCV 88 fL      MCH 29 2 pg      MCHC 33 4 g/dL      RDW 13 2 %      MPV 9 3 fL      Platelets 342 Thousands/uL      nRBC 0 /100 WBCs      Neutrophils Relative 51 %      Immat GRANS % 1 %      Lymphocytes Relative 30 %      Monocytes Relative 12 %      Eosinophils Relative 4 %      Basophils Relative 2 %      Neutrophils Absolute 1 98 Thousands/µL      Immature Grans Absolute 0 03 Thousand/uL      Lymphocytes Absolute 1 18 Thousands/µL      Monocytes Absolute 0 46 Thousand/µL      Eosinophils Absolute 0 15 Thousand/µL      Basophils Absolute 0 08 Thousands/µL                  XR chest 1 view portable    (Results Pending)         Procedures  ECG 12 Lead Documentation Only    Date/Time: 11/9/2022 11:42 AM  Performed by: Anupam Duque DO  Authorized by: Anupam Duque DO     Indications / Diagnosis:  S/p AICD defib  ECG reviewed by me, the ED Provider: yes    Patient location:  ED  Previous ECG:     Previous ECG:  Unavailable    Comparison to cardiac monitor: Yes    Interpretation:     Interpretation: non-specific    Rate:     ECG rate:  101    ECG rate assessment: tachycardic    Rhythm:     Rhythm: sinus rhythm    Ectopy:     Ectopy: PVCs      PVCs:  Infrequent and multifocal  QRS:     QRS axis:  Indeterminate    QRS intervals:  Normal  Conduction:     Conduction: normal    ST segments:     ST segments:  Normal  T waves:     T waves: inverted      Inverted:  III and aVF          ED Course  ED Course as of 11/09/22 1656   Wed Nov 09, 2022   1321 Pending AICD interrogation report  [phone] Per [employer], patient had new onset afib with intermittent RVR for the past 3 days    Had afib with RVR with ventricular rate in the 200's this morning, at which point the AICD fired, converting rhythm to NSR    1637 Patient declining admission and transfer to [facility]  States that he had an extensive discussion with his cardiologist and EP physician at [facility], was told not to accept treatment or intervention here as Sybil Jones does not have the "expertise" to treat his condition, wants to f/u with [facility] physicians tomorrow  Advised of risks of leaving AMA, however wishes to leave, declines medication adjustments or blood thinner initiation  [address] signed  SBIRT 22yo+    Flowsheet Row Most Recent Value   SBIRT (25 yo +)    In order to provide better care to our patients, we are screening all of our patients for alcohol and drug use  Would it be okay to ask you these screening questions? Yes Filed at: 11/09/2022 1211   Initial Alcohol Screen: US AUDIT-C     1  How often do you have a drink containing alcohol? 0 Filed at: 11/09/2022 1211   2  How many drinks containing alcohol do you have on a typical day you are drinking? 0 Filed at: 11/09/2022 1211   3a  Male UNDER 65: How often do you have five or more drinks on one occasion? 0 Filed at: 11/09/2022 1211   3b  FEMALE Any Age, or MALE 65+: How often do you have 4 or more drinks on one occassion? 0 Filed at: 11/09/2022 1211   Audit-C Score 0 Filed at: 11/09/2022 1211   ELIF: How many times in the past year have you    Used an illegal drug or used a prescription medication for non-medical reasons? Never Filed at: 11/09/2022 1211                MDM    Disposition  Final diagnoses:   None     ED Disposition     None      Follow-up Information    None         Patient's Medications   Discharge Prescriptions    No medications on file     No discharge procedures on file  PDMP Review     None           ED Provider  Attending physically available and evaluated Tatianna Gilbert I managed the patient along with the ED Attending      Electronically Signed by reviewed  Tests in the radiology section of CPT®: ordered and reviewed  Discuss the patient with other providers: yes  Independent visualization of images, tracings, or specimens: yes        Disposition  Final diagnoses:   ICD (implantable cardioverter-defibrillator) discharge   Atrial fibrillation ([address] )     Time reflects when diagnosis was documented in both MDM as applicable and the Disposition within this note     Time User Action Codes Description Comment    11/9/2022  4:42 PM Marilyn Castillo [Z45 [id number]] ICD (implantable cardioverter-defibrillator) discharge     11/9/2022  4:42 PM Marilyn Castillo [[id number]] Atrial fibrillation [facility])       ED Disposition     ED Disposition   AMA    Condition   --    Date/Time   Wed Nov 9, 2022  4:37 PM    Comment   Date: 11/9/2022  Patient: Rafaela Flores  Admitted: 11/9/2022 12:11 PM  Attending Provider: Jose Russ MD    Rafaela Flores or his authorized caregiver has made the decision for the patient to leave the emergency department against the advic e of the emergency department staff  He or his authorized caregiver has been informed and understands the inherent risks, including death, seizure, stroke, permanent disability, multiorgan failure, and/or prolonged ICU stay  He or his authorized ca regiver has decided to accept the responsibility for this decision  Rafaela Flores and all necessary parties have been advised that he may return for further evaluation or treatment  His condition at time of discharge was guarded    Rafaela Flores  had current vital signs as follows:  /67 (BP Location: Right arm)   Pulse 96   Temp 97 9 °F (36 6 °C) (Oral)   Resp 18   Ht 5' 10" (1 778 m)   Wt 122 kg (269 lb 13 5 oz)            Follow-up Information    None         Discharge Medication List as of 11/9/2022  4:45 PM      CONTINUE these medications which have NOT CHANGED    Details   alfuzosin (UROXATRAL) 10 mg 24 hr tablet Take 10 mg by mouth daily, Historical Med      AMLODIPINE BESYLATE PO Historical Med      aspirin (ECOTRIN LOW STRENGTH) 81 mg EC tablet Take 81 mg by mouth, Historical Med      dutasteride (AVODART) 0 5 mg capsule Take 1 capsule by mouth daily, Starting Tue 6/24/2014, Historical Med      Empagliflozin 25 MG TABS Take 1 tablet by mouth, Historical Med      furosemide (LASIX) 20 mg tablet Take 2 tablets by mouth, Historical Med      losartan (COZAAR) 50 mg tablet Take 50 mg by mouth, Historical Med      metFORMIN (GLUCOPHAGE) 500 mg tablet Take 1,000 mg by mouth 2 (two) times a day with meals , Historical Med      metoprolol succinate (TOPROL-XL) 50 mg 24 hr tablet Take 0 5 tablets by mouth, Historical Med      spironolactone (ALDACTONE) 25 mg tablet Take 25 mg by mouth daily, Historical Med      cetirizine (ZyrTEC) 10 mg tablet Take 1 tablet by mouth, Historical Med      cyclobenzaprine (FLEXERIL) 10 mg tablet TAKE 1 TABLET BY MOUTH DAILY AT BEDTIME, Normal      fluticasone (FLOVENT HFA) 110 MCG/ACT inhaler Inhale 2 puffs 2 (two) times a day Rinse mouth after use , Historical Med      fluticasone (VERAMYST) 27 5 MCG/SPRAY nasal spray 2 sprays into each nostril daily, Historical Med      rosuvastatin (CRESTOR) 10 MG tablet Take 10 mg by mouth daily, Historical Med      sildenafil (REVATIO) 20 mg tablet Take 20 mg by mouth, Starting Thu 2/28/2019, Historical Med      tadalafil (CIALIS) 20 MG tablet Take 20 mg by mouth, Starting Fri 5/19/2017, Historical Med           No discharge procedures on file  PDMP Review     None           ED Provider  Attending physically available and evaluated Juan Adams  FANTA managed the patient along with the ED Attending      Electronically Signed by         Sim Harada, DO  11/17/22 5978

## 2022-11-11 NOTE — ED ATTENDING ATTESTATION
11/9/2022  IAnjel MD, saw and evaluated the patient  I have discussed the patient with the resident/non-physician practitioner and agree with the resident's/non-physician practitioner's findings, Plan of Care, and MDM as documented in the resident's/non-physician practitioner's note, except where noted  All available labs and Radiology studies were reviewed  I was present for key portions of any procedure(s) performed by the resident/non-physician practitioner and I was immediately available to provide assistance  At this point I agree with the current assessment done in the Emergency Department  I have conducted an independent evaluation of this patient a history and physical is as follows:    30-year-old presenting to emergency department after feeling shock by his defibrillator  Was feeling lightheaded and nauseous before the shock  Has no complaints at this time  Agree with workup, cardiac evaluation, check electrolytes, interrogate pacemaker    Patient does not want to be admitted to the hospital   Will leave against medical advice    Patient is alert and oriented x4, not slurring, not altered, able to make decisions      ED Course         Critical Care Time  Procedures

## 2022-11-30 ENCOUNTER — HOSPITAL ENCOUNTER (EMERGENCY)
Facility: HOSPITAL | Age: 62
Discharge: HOME | End: 2022-11-30
Attending: EMERGENCY MEDICINE

## 2022-11-30 ENCOUNTER — APPOINTMENT (EMERGENCY)
Dept: RADIOLOGY | Facility: HOSPITAL | Age: 62
End: 2022-11-30

## 2022-11-30 VITALS
OXYGEN SATURATION: 96 % | HEART RATE: 97 BPM | TEMPERATURE: 97.8 F | SYSTOLIC BLOOD PRESSURE: 122 MMHG | DIASTOLIC BLOOD PRESSURE: 85 MMHG | WEIGHT: 246.03 LBS | BODY MASS INDEX: 35.3 KG/M2 | RESPIRATION RATE: 20 BRPM

## 2022-11-30 DIAGNOSIS — J10.1 INFLUENZA A: Primary | ICD-10-CM

## 2022-11-30 DIAGNOSIS — J06.9 VIRAL URI WITH COUGH: ICD-10-CM

## 2022-11-30 DIAGNOSIS — J02.9 PHARYNGITIS: ICD-10-CM

## 2022-11-30 DIAGNOSIS — L03.116 CELLULITIS OF LEFT LOWER EXTREMITY: ICD-10-CM

## 2022-11-30 LAB
FLUAV RNA RESP QL NAA+PROBE: POSITIVE
FLUBV RNA RESP QL NAA+PROBE: NEGATIVE
RSV RNA RESP QL NAA+PROBE: NEGATIVE
SARS-COV-2 RNA RESP QL NAA+PROBE: NEGATIVE

## 2022-11-30 RX ORDER — GUAIFENESIN/DEXTROMETHORPHAN 100-10MG/5
10 SYRUP ORAL ONCE
Status: COMPLETED | OUTPATIENT
Start: 2022-11-30 | End: 2022-11-30

## 2022-11-30 RX ORDER — CEPHALEXIN 250 MG/1
500 CAPSULE ORAL ONCE
Status: COMPLETED | OUTPATIENT
Start: 2022-11-30 | End: 2022-11-30

## 2022-11-30 RX ORDER — CEPHALEXIN 500 MG/1
500 CAPSULE ORAL 4 TIMES DAILY
Qty: 27 CAPSULE | Refills: 0 | Status: SHIPPED | OUTPATIENT
Start: 2022-11-30 | End: 2022-12-07

## 2022-11-30 RX ORDER — GUAIFENESIN/DEXTROMETHORPHAN 100-10MG/5
5 SYRUP ORAL 3 TIMES DAILY PRN
Qty: 118 ML | Refills: 0 | Status: SHIPPED | OUTPATIENT
Start: 2022-11-30

## 2022-11-30 RX ORDER — IPRATROPIUM BROMIDE AND ALBUTEROL SULFATE 2.5; .5 MG/3ML; MG/3ML
3 SOLUTION RESPIRATORY (INHALATION) ONCE
Status: COMPLETED | OUTPATIENT
Start: 2022-11-30 | End: 2022-11-30

## 2022-11-30 RX ADMIN — IPRATROPIUM BROMIDE AND ALBUTEROL SULFATE 3 ML: 2.5; .5 SOLUTION RESPIRATORY (INHALATION) at 06:30

## 2022-11-30 RX ADMIN — CEPHALEXIN 500 MG: 250 CAPSULE ORAL at 07:17

## 2022-11-30 RX ADMIN — GUAIFENESIN AND DEXTROMETHORPHAN 10 ML: 100; 10 SYRUP ORAL at 06:29

## 2022-11-30 NOTE — DISCHARGE INSTRUCTIONS
Schedule an appointment with your primary care provider in the next 2-3 days for re-evaluation  Increase your fluids to maintain your hydration  Return sooner to the Emergency Department if increased shortness of breath, fever, pain, vomiting, bleeding, weakness, dizziness  Also return to the Emergency Department sooner if increased pain, swelling, or redness to your left lower leg wound

## 2022-11-30 NOTE — ED PROVIDER NOTES
History  Chief Complaint   Patient presents with   • Cough     Pt reports chest cold with cough and congestion for 3 days  Started a zpac today  Reports lots mucous and can't bring up, can't sleep with CPap due to coughing  Denies CP/SOB/N/V     Patient is a 70-year-old male with PMH of HTN, T2DM, BERNARD, and BPH presenting for evaluation of 4 days of URI symptoms  Patient reports 4 days of sore throat, painful swallowing, and congested cough  He notes he reached out to his primary care provider who prescribed him azithromycin pack  He took his 1st dose this evening but is not having any clinical improvement  He notes he has lost his voice this evening and is having difficulty sleeping with CPAP due to coughing fits  He denies fevers, chills, headache, vision changes, difficulty swallowing, chest pain, shortness of breath, abdominal pain, N/V/D, and urinary complaints  He notes he has been taking Mucinex and other OTC cough suppressants without therapeutic effect        History provided by:  Patient   used: No    URI  Presenting symptoms: congestion, cough, fatigue and sore throat    Presenting symptoms: no ear pain, no facial pain, no fever and no rhinorrhea    Congestion:     Location:  Nasal and chest    Interferes with sleep: yes      Interferes with eating/drinking: no    Cough:     Cough characteristics:  Hoarse and productive    Sputum characteristics:  White and clear    Severity:  Moderate    Onset quality:  Unable to specify    Duration:  4 days    Timing:  Intermittent    Progression:  Unchanged    Chronicity:  New  Fatigue:     Severity:  Moderate    Duration:  4 days    Timing:  Constant    Progression:  Unchanged  Sore throat:     Severity:  Moderate    Onset quality:  Gradual    Duration:  4 days    Timing:  Constant    Progression:  Unchanged  Severity:  Moderate  Onset quality:  Gradual  Duration:  4 days  Timing:  Constant  Progression:  Worsening  Chronicity:  New  Relieved by:  Nothing  Ineffective treatments:  Decongestant, hot fluids, rest, OTC medications and drinking  Associated symptoms: no arthralgias, no headaches, no myalgias, no neck pain, no sinus pain, no sneezing, no swollen glands and no wheezing    Risk factors: chronic cardiac disease and diabetes mellitus    Risk factors: no chronic kidney disease, no chronic respiratory disease, no immunosuppression, no recent illness and no sick contacts        Prior to Admission Medications   Prescriptions Last Dose Informant Patient Reported? Taking? AMLODIPINE BESYLATE PO   Yes No   Empagliflozin 25 MG TABS   Yes No   Sig: Take 1 tablet by mouth   alfuzosin (UROXATRAL) 10 mg 24 hr tablet   Yes No   Sig: Take 10 mg by mouth daily   aspirin (ECOTRIN LOW STRENGTH) 81 mg EC tablet   Yes No   Sig: Take 81 mg by mouth   cetirizine (ZyrTEC) 10 mg tablet   Yes No   Sig: Take 1 tablet by mouth   cyclobenzaprine (FLEXERIL) 10 mg tablet   No No   Sig: TAKE 1 TABLET BY MOUTH DAILY AT BEDTIME   Patient not taking: Reported on 2022   dutasteride (AVODART) 0 5 mg capsule   Yes No   Sig: Take 1 capsule by mouth daily   fluticasone (FLOVENT HFA) 110 MCG/ACT inhaler   Yes No   Sig: Inhale 2 puffs 2 (two) times a day Rinse mouth after use     Patient not taking: Reported on 2022   fluticasone (VERAMYST) 27 5 MCG/SPRAY nasal spray   Yes No   Si sprays into each nostril daily   Patient not taking: Reported on 2022   furosemide (LASIX) 20 mg tablet   Yes No   Sig: Take 2 tablets by mouth   losartan (COZAAR) 50 mg tablet   Yes No   Sig: Take 50 mg by mouth   metFORMIN (GLUCOPHAGE) 500 mg tablet   Yes No   Sig: Take 1,000 mg by mouth 2 (two) times a day with meals    metoprolol succinate (TOPROL-XL) 50 mg 24 hr tablet   Yes No   Sig: Take 0 5 tablets by mouth   rosuvastatin (CRESTOR) 10 MG tablet   Yes No   Sig: Take 10 mg by mouth daily   Patient not taking: Reported on 2022   sildenafil (REVATIO) 20 mg tablet   Yes No   Sig: Take 20 mg by mouth   Patient not taking: Reported on 11/9/2022   spironolactone (ALDACTONE) 25 mg tablet   Yes No   Sig: Take 25 mg by mouth daily   tadalafil (CIALIS) 20 MG tablet   Yes No   Sig: Take 20 mg by mouth   Patient not taking: Reported on 11/9/2022      Facility-Administered Medications: None       Past Medical History:   Diagnosis Date   • BPH (benign prostatic hyperplasia)    • Diabetes mellitus ([facility] [address] )    • ICD (implantable cardioverter-defibrillator) in place        Past Surgical History:   Procedure Laterality Date   • CHOLECYSTECTOMY         History reviewed  No pertinent family history  I have reviewed and agree with the history as documented  E-Cigarette/Vaping   • E-Cigarette Use Never User      E-Cigarette/Vaping Substances   • Nicotine No    • THC No    • CBD No    • Flavoring No    • Other No    • Unknown No      Social History     Tobacco Use   • Smoking status: Never   • Smokeless tobacco: Never   Vaping Use   • Vaping Use: Never used   Substance Use Topics   • Alcohol use: No   • Drug use: No       Review of Systems   Constitutional: Positive for chills and fatigue  Negative for fever  HENT: Positive for congestion and sore throat  Negative for ear pain, rhinorrhea, sinus pain, sneezing and trouble swallowing  Eyes: Negative for visual disturbance  Respiratory: Positive for cough  Negative for shortness of breath and wheezing  Cardiovascular: Positive for leg swelling (chronic b/l, no change from prior)  Negative for chest pain and palpitations  Gastrointestinal: Negative for abdominal pain, diarrhea, nausea and vomiting  Genitourinary: Negative for decreased urine volume  Musculoskeletal: Negative for arthralgias, back pain, gait problem, myalgias, neck pain and neck stiffness  Skin: Positive for wound (L anterior shin wound)  Negative for color change and rash  Neurological: Negative for dizziness, light-headedness and headaches     All other systems reviewed and are negative  Physical Exam  Physical Exam  Vitals and nursing note reviewed  Constitutional:       General: He is not in acute distress  Appearance: Normal appearance  He is well-developed  He is obese  He is ill-appearing  He is not toxic-appearing or diaphoretic  HENT:      Head: Normocephalic and atraumatic  Nose: Nose normal  No congestion or rhinorrhea  Mouth/Throat:      Lips: Pink  No lesions  Mouth: Mucous membranes are moist       Pharynx: Oropharynx is clear  Uvula midline  Posterior oropharyngeal erythema present  No pharyngeal swelling, oropharyngeal exudate or uvula swelling  Tonsils: No tonsillar exudate or tonsillar abscesses  0 on the right  0 on the left  Eyes:      Extraocular Movements: Extraocular movements intact  Conjunctiva/sclera: Conjunctivae normal    Neck:      Trachea: Trachea normal  No abnormal tracheal secretions  Cardiovascular:      Rate and Rhythm: Normal rate  Pulses:           Radial pulses are 2+ on the right side and 2+ on the left side  Posterior tibial pulses are 1+ on the right side and 1+ on the left side  Heart sounds: Normal heart sounds, S1 normal and S2 normal  No murmur heard  Pulmonary:      Effort: Pulmonary effort is normal  No tachypnea or respiratory distress  Breath sounds: Normal air entry  No stridor, decreased air movement or transmitted upper airway sounds  Examination of the right-lower field reveals decreased breath sounds  Examination of the left-lower field reveals decreased breath sounds  Decreased breath sounds present  Abdominal:      Palpations: Abdomen is soft  Tenderness: There is no abdominal tenderness  There is no guarding or rebound  Musculoskeletal:         General: Normal range of motion  Cervical back: Full passive range of motion without pain, normal range of motion and neck supple  Right lower le+ Pitting Edema present        Left lower le+ Pitting Edema present  Skin:     General: Skin is warm and dry  Capillary Refill: Capillary refill takes less than 2 seconds  Findings: Wound present  No rash  Neurological:      General: No focal deficit present  Mental Status: He is alert and oriented to person, place, and time  Mental status is at baseline  GCS: GCS eye subscore is 4  GCS verbal subscore is 5  GCS motor subscore is 6  Vital Signs  ED Triage Vitals   Temperature Pulse Respirations Blood Pressure SpO2   11/30/22 0447 11/30/22 0447 11/30/22 0447 11/30/22 0447 11/30/22 0447   97 8 °F (36 6 °C) 96 20 112/73 96 %      Temp Source Heart Rate Source Patient Position - Orthostatic VS BP Location FiO2 (%)   11/30/22 0447 11/30/22 0447 -- -- --   Oral Monitor         Pain Score       11/30/22 0618       6           Vitals:    11/30/22 0447 11/30/22 0618   BP: 112/73 122/85   Pulse: 96 97         Visual Acuity      ED Medications  Medications   ipratropium-albuterol (DUO-NEB) 0 5-2 5 mg/3 mL inhalation solution 3 mL (3 mL Nebulization Given 11/30/22 0630)   dextromethorphan-guaiFENesin (ROBITUSSIN DM) oral syrup 10 mL (10 mL Oral Given 11/30/22 0629)   cephalexin (KEFLEX) capsule 500 mg (500 mg Oral Given 11/30/22 0435)       Diagnostic Studies  Results Reviewed     Procedure Component Value Units Date/Time    FLU/RSV/COVID - if FLU/RSV clinically relevant [[id number]]  (Abnormal) Collected: 11/30/22 0453    Lab Status: Final result Specimen: Nares from Nose Updated: 11/30/22 0537     SARS-CoV-2 Negative     INFLUENZA A PCR Positive     INFLUENZA B PCR Negative     RSV PCR Negative    Narrative:      FOR PEDIATRIC PATIENTS - copy/paste COVID Guidelines URL to browser: [email or web address]    SARS-CoV-2 assay is a Nucleic Acid Amplification assay intended for the  qualitative detection of nucleic acid from SARS-CoV-2 in nasopharyngeal  swabs   Results are for the presumptive identification of SARS-CoV-2 RNA  Positive results are indicative of infection with SARS-CoV-2, the virus  causing COVID-19, but do not rule out bacterial infection or co-infection  with other viruses  Laboratories within the [place] and its  territories are required to report all positive results to the appropriate  public health authorities  Negative results do not preclude SARS-CoV-2  infection and should not be used as the sole basis for treatment or other  patient management decisions  Negative results must be combined with  clinical observations, patient history, and epidemiological information  This test has not been FDA cleared or approved  This test has been authorized by FDA under an Emergency Use Authorization  (EUA)  This test is only authorized for the duration of time the  declaration that circumstances exist justifying the authorization of the  emergency use of an in vitro diagnostic tests for detection of SARS-CoV-2  virus and/or diagnosis of COVID-19 infection under section 564(b)(1) of  the Act, 21 U  S C  210YFZ-1(O)(0), unless the authorization is terminated  or revoked sooner  The test has been validated but independent review by FDA  and CLIA is pending  Test performed using [employer] GeneXpert: This RT-PCR assay targets N2,  a region unique to SARS-CoV-2  A conserved region in the E-gene was chosen  for pan-Sarbecovirus detection which includes SARS-CoV-2  According to CMS-2020-01-R, this platform meets the definition of high-throughput technology  XR chest 1 view portable   ED Interpretation by Magalys Huang (11/30 5448)   No acute cardiopulmonary disease identified  Final Result by Liz Flanagan MD (11/30 5181)      No acute cardiopulmonary disease                    Workstation performed: [id number]                    Procedures  Procedures         ED Course  ED Course as of 11/30/22 0928   Wed Nov 30, 2022   0608 INFLU A PCR(!): Positive  Likely source of pt's symptoms   0655 On reassessment, improved aeration throughout  Patient brought to my attention and acute on chronic wound to the left lower shin  He notes he injured it while using a weed whacker in June of 2022 (6 months PTA)  He notes over the last few days the development of redness and purulent drainage from the wound  On exam, there is a 2 cm wound with surrounding erythema, warmth, and scant swelling  Scant purulent drainage expressed  No induration or fluctuance at the site  Wound bed cleaned and pink epithelialization present  Plan to treat with antibiotics for cellulitis   0731 XR chest 1 view portable  IMPRESSION:     No acute cardiopulmonary disease  4832 Patient with improved aeration throughout after receiving DuoNeb  Wound care performed to left anterior shin and dry gauze applied  Plan made for Keflex 4 times daily x7 days for cellulitis  Plan made for discharge home given patient feels improved after medications  He is not a candidate for Tamiflu as he is 4 days into his illness  DC paperwork reviewed with emphasis on new prescriptions, follow-up with primary care provider, and strict return precautions  I discussed at the threshold to return is low given his comorbidities  Patient agreeable plan as no further questions at this time  Patient with improved appearance, in no acute distress, ambulatory with steady gait at time of discharge                                               MDM  Number of Diagnoses or Management Options  Cellulitis of left lower extremity: new and does not require workup  Influenza A: new and does not require workup  Pharyngitis: new and does not require workup  Viral URI with cough: new and requires workup  Diagnosis management comments: DDx including but not limited to: URI, bronchitis, pharyngitis, pneumonia, viral illness, COVID 23, flu a, flu B, RSV, abscess, cellulitis       Amount and/or Complexity of Data Reviewed  Tests in the radiology section of CPT®: ordered and reviewed  Decide to obtain previous medical records or to obtain history from someone other than the patient: yes  Review and summarize past medical records: yes  Independent visualization of images, tracings, or specimens: yes    Patient Progress  Patient progress: improved      Disposition  Final diagnoses:   Influenza A   Pharyngitis   Cellulitis of left lower extremity   Viral URI with cough     Time reflects when diagnosis was documented in both MDM as applicable and the Disposition within this note     Time User Action Codes Description Comment    11/30/2022  7:02 AM Marcelene So Add [J10 1] Influenza A     11/30/2022  7:02 AM Jinx Nan, Chantell Serene Add [J02 9] Pharyngitis     11/30/2022  7:03 AM Jinx Nan, Chantell Serene Add [T90 588] Cellulitis of left lower extremity     11/30/2022  7:06 AM Marcelene So Add [J06 9] Viral URI with cough       ED Disposition     ED Disposition   Discharge    Condition   Stable    Date/Time   Wed Nov 30, 2022  7:02 AM    Comment   Clarice Pretty discharge to home/self care                 Follow-up Information     Follow up With Specialties Details Why Contact Info Additional [address]  [facility] Schedule an appointment as soon as possible for a visit in 3 days  [address] [phone]  [facility], [address], [place], [address] Emergency Department Emergency Medicine Go to  If symptoms worsen [address] Emergency Department, [address], [place], [place], [address]          Discharge Medication List as of 11/30/2022  7:16 AM      START taking these medications    Details   cephalexin (KEFLEX) 500 mg capsule Take 1 capsule (500 mg total) by mouth 4 (four) times a day for 7 days, Starting Wed 11/30/2022, Until Wed 12/7/2022, Normal      dextromethorphan-guaiFENesin (ROBITUSSIN DM)  mg/5 mL syrup Take 5 mL by mouth 3 (three) times a day as needed for cough, Starting Wed 11/30/2022, Normal         CONTINUE these medications which have NOT CHANGED    Details   alfuzosin (UROXATRAL) 10 mg 24 hr tablet Take 10 mg by mouth daily, Historical Med      AMLODIPINE BESYLATE PO Historical Med      aspirin (ECOTRIN LOW STRENGTH) 81 mg EC tablet Take 81 mg by mouth, Historical Med      cetirizine (ZyrTEC) 10 mg tablet Take 1 tablet by mouth, Historical Med      cyclobenzaprine (FLEXERIL) 10 mg tablet TAKE 1 TABLET BY MOUTH DAILY AT BEDTIME, Normal      dutasteride (AVODART) 0 5 mg capsule Take 1 capsule by mouth daily, Starting Tue 6/24/2014, Historical Med      Empagliflozin 25 MG TABS Take 1 tablet by mouth, Historical Med      fluticasone (FLOVENT HFA) 110 MCG/ACT inhaler Inhale 2 puffs 2 (two) times a day Rinse mouth after use , Historical Med      fluticasone (VERAMYST) 27 5 MCG/SPRAY nasal spray 2 sprays into each nostril daily, Historical Med      furosemide (LASIX) 20 mg tablet Take 2 tablets by mouth, Historical Med      losartan (COZAAR) 50 mg tablet Take 50 mg by mouth, Historical Med      metFORMIN (GLUCOPHAGE) 500 mg tablet Take 1,000 mg by mouth 2 (two) times a day with meals , Historical Med      metoprolol succinate (TOPROL-XL) 50 mg 24 hr tablet Take 0 5 tablets by mouth, Historical Med      rosuvastatin (CRESTOR) 10 MG tablet Take 10 mg by mouth daily, Historical Med      sildenafil (REVATIO) 20 mg tablet Take 20 mg by mouth, Starting Thu 2/28/2019, Historical Med      spironolactone (ALDACTONE) 25 mg tablet Take 25 mg by mouth daily, Historical Med      tadalafil (CIALIS) 20 MG tablet Take 20 mg by mouth, Starting Fri 5/19/2017, Historical Med             No discharge procedures on file      PDMP Review     None          ED Provider  Electronically Signed by           Magalys Sousa  11/30/22 5413

## 2023-04-28 ENCOUNTER — APPOINTMENT (EMERGENCY)
Dept: CT IMAGING | Facility: HOSPITAL | Age: 63
End: 2023-04-28

## 2023-04-28 ENCOUNTER — HOSPITAL ENCOUNTER (EMERGENCY)
Facility: HOSPITAL | Age: 63
Discharge: HOME | End: 2023-04-28
Attending: STUDENT IN AN ORGANIZED HEALTH CARE EDUCATION/TRAINING PROGRAM

## 2023-04-28 VITALS
DIASTOLIC BLOOD PRESSURE: 83 MMHG | BODY MASS INDEX: 39.19 KG/M2 | SYSTOLIC BLOOD PRESSURE: 122 MMHG | OXYGEN SATURATION: 98 % | TEMPERATURE: 97.6 F | RESPIRATION RATE: 17 BRPM | HEART RATE: 92 BPM | WEIGHT: 273.15 LBS

## 2023-04-28 DIAGNOSIS — K40.90 INDIRECT RIGHT INGUINAL HERNIA: Primary | ICD-10-CM

## 2023-04-28 DIAGNOSIS — K42.9 UMBILICAL HERNIA: ICD-10-CM

## 2023-04-28 DIAGNOSIS — K76.0 FATTY LIVER: ICD-10-CM

## 2023-04-28 DIAGNOSIS — J98.11 ATELECTASIS: ICD-10-CM

## 2023-04-28 DIAGNOSIS — K57.90 DIVERTICULOSIS: ICD-10-CM

## 2023-04-28 DIAGNOSIS — N43.3 HYDROCELE, BILATERAL: ICD-10-CM

## 2023-04-28 DIAGNOSIS — N32.3 BLADDER DIVERTICULUM: ICD-10-CM

## 2023-04-28 DIAGNOSIS — N40.0 ENLARGED PROSTATE: ICD-10-CM

## 2023-04-28 LAB
ALBUMIN SERPL BCP-MCNC: 3.9 G/DL (ref 3.5–5)
ALP SERPL-CCNC: 77 U/L (ref 34–104)
ALT SERPL W P-5'-P-CCNC: 25 U/L (ref 7–52)
ANION GAP SERPL CALCULATED.3IONS-SCNC: 10 MMOL/L (ref 4–13)
AST SERPL W P-5'-P-CCNC: 17 U/L (ref 13–39)
BACTERIA UR QL AUTO: NORMAL /HPF
BASOPHILS # BLD AUTO: 0.08 THOUSANDS/ΜL (ref 0–0.1)
BASOPHILS NFR BLD AUTO: 2 % (ref 0–1)
BILIRUB SERPL-MCNC: 0.32 MG/DL (ref 0.2–1)
BILIRUB UR QL STRIP: NEGATIVE
BUN SERPL-MCNC: 17 MG/DL (ref 5–25)
CALCIUM SERPL-MCNC: 9.3 MG/DL (ref 8.4–10.2)
CHLORIDE SERPL-SCNC: 102 MMOL/L (ref 96–108)
CLARITY UR: CLEAR
CO2 SERPL-SCNC: 21 MMOL/L (ref 21–32)
COLOR UR: ABNORMAL
CREAT SERPL-MCNC: 0.97 MG/DL (ref 0.6–1.3)
EOSINOPHIL # BLD AUTO: 0.2 THOUSAND/ΜL (ref 0–0.61)
EOSINOPHIL NFR BLD AUTO: 5 % (ref 0–6)
ERYTHROCYTE [DISTWIDTH] IN BLOOD BY AUTOMATED COUNT: 13 % (ref 11.6–15.1)
GFR SERPL CREATININE-BSD FRML MDRD: 83 ML/MIN/1.73SQ M
GLUCOSE SERPL-MCNC: 210 MG/DL (ref 65–140)
GLUCOSE SERPL-MCNC: 248 MG/DL (ref 65–140)
GLUCOSE UR STRIP-MCNC: ABNORMAL MG/DL
HCT VFR BLD AUTO: 48.7 % (ref 36.5–49.3)
HGB BLD-MCNC: 16.2 G/DL (ref 12–17)
HGB UR QL STRIP.AUTO: NEGATIVE
IMM GRANULOCYTES # BLD AUTO: 0.03 THOUSAND/UL (ref 0–0.2)
IMM GRANULOCYTES NFR BLD AUTO: 1 % (ref 0–2)
KETONES UR STRIP-MCNC: ABNORMAL MG/DL
LEUKOCYTE ESTERASE UR QL STRIP: ABNORMAL
LIPASE SERPL-CCNC: 36 U/L (ref 11–82)
LYMPHOCYTES # BLD AUTO: 1.19 THOUSANDS/ΜL (ref 0.6–4.47)
LYMPHOCYTES NFR BLD AUTO: 28 % (ref 14–44)
MCH RBC QN AUTO: 30.1 PG (ref 26.8–34.3)
MCHC RBC AUTO-ENTMCNC: 33.3 G/DL (ref 31.4–37.4)
MCV RBC AUTO: 91 FL (ref 82–98)
MONOCYTES # BLD AUTO: 0.41 THOUSAND/ΜL (ref 0.17–1.22)
MONOCYTES NFR BLD AUTO: 10 % (ref 4–12)
NEUTROPHILS # BLD AUTO: 2.4 THOUSANDS/ΜL (ref 1.85–7.62)
NEUTS SEG NFR BLD AUTO: 54 % (ref 43–75)
NITRITE UR QL STRIP: NEGATIVE
NON-SQ EPI CELLS URNS QL MICRO: NORMAL /HPF
NRBC BLD AUTO-RTO: 0 /100 WBCS
PH UR STRIP.AUTO: 5 [PH]
PLATELET # BLD AUTO: 196 THOUSANDS/UL (ref 149–390)
PMV BLD AUTO: 9.6 FL (ref 8.9–12.7)
POTASSIUM SERPL-SCNC: 4.2 MMOL/L (ref 3.5–5.3)
PROT SERPL-MCNC: 6.4 G/DL (ref 6.4–8.4)
PROT UR STRIP-MCNC: NEGATIVE MG/DL
RBC # BLD AUTO: 5.38 MILLION/UL (ref 3.88–5.62)
RBC #/AREA URNS AUTO: NORMAL /HPF
SODIUM SERPL-SCNC: 133 MMOL/L (ref 135–147)
SP GR UR STRIP.AUTO: 1.03 (ref 1–1.03)
UROBILINOGEN UR STRIP-ACNC: <2 MG/DL
WBC # BLD AUTO: 4.31 THOUSAND/UL (ref 4.31–10.16)
WBC #/AREA URNS AUTO: NORMAL /HPF

## 2023-04-28 RX ORDER — KETOROLAC TROMETHAMINE 30 MG/ML
15 INJECTION, SOLUTION INTRAMUSCULAR; INTRAVENOUS ONCE
Status: COMPLETED | OUTPATIENT
Start: 2023-04-28 | End: 2023-04-28

## 2023-04-28 RX ORDER — ACETAMINOPHEN 325 MG/1
975 TABLET ORAL ONCE
Status: DISCONTINUED | OUTPATIENT
Start: 2023-04-28 | End: 2023-04-28

## 2023-04-28 RX ADMIN — KETOROLAC TROMETHAMINE 15 MG: 30 INJECTION, SOLUTION INTRAMUSCULAR; INTRAVENOUS at 14:36

## 2023-04-28 RX ADMIN — IOHEXOL 100 ML: 350 INJECTION, SOLUTION INTRAVENOUS at 15:32

## 2023-04-28 NOTE — Clinical Note
Christopher Howard was seen and treated in our emergency department on 4/28/2023  Diagnosis:     Wilson Aase  may return to work on return date  He may return on this date: 05/01/2023    Can return sooner if feeling okay     If you have any questions or concerns, please don't hesitate to call        Jorge Griffin MD    ______________________________           _______________          _______________  Hospital Representative                              Date                                Time

## 2023-04-28 NOTE — ED PROVIDER NOTES
History  Chief Complaint   Patient presents with   • Groin Pain     Right groin pain since yesterday--pain goes down into testicle  Hx of hernia--has not had surgery yet  No urinary complaints  65year old male with R inguinal hernia presenting to the ED due to R groin pain  States over the past few months, he has not been able to reduce it on his own when he used to be able to reduce it  Over the past 24 hours, reporting increasing pain over the hernia area  Has been taking Tylenol and Motrin with some relief  Able to pass gas normally  Last bowel movement this morning  No nausea, vomiting, fever, chills, inability to pass gas or stool, chest pain, shortness of breath, urinary symptoms URI symptoms  Prior to Admission Medications   Prescriptions Last Dose Informant Patient Reported? Taking? AMLODIPINE BESYLATE PO   Yes No   Empagliflozin 25 MG TABS 2023  Yes Yes   Sig: Take 25 mg by mouth   alfuzosin (UROXATRAL) 10 mg 24 hr tablet 2023  Yes Yes   Sig: Take 10 mg by mouth daily   apixaban (ELIQUIS) 5 mg today  Yes Yes   Sig: Take 5 mg by mouth 2 (two) times a day   aspirin (ECOTRIN LOW STRENGTH) 81 mg EC tablet Not Taking  Yes No   Sig: Take 81 mg by mouth   Patient not taking: Reported on 2023   cetirizine (ZyrTEC) 10 mg tablet 2023  Yes Yes   Sig: Take 1 tablet by mouth   cyclobenzaprine (FLEXERIL) 10 mg tablet Past Week  No Yes   Sig: TAKE 1 TABLET BY MOUTH DAILY AT BEDTIME   dextromethorphan-guaiFENesin (ROBITUSSIN DM)  mg/5 mL syrup Past Week  No Yes   Sig: Take 5 mL by mouth 3 (three) times a day as needed for cough   dutasteride (AVODART) 0 5 mg capsule 2023  Yes Yes   Sig: Take 1 capsule by mouth daily   fluticasone (FLOVENT HFA) 110 MCG/ACT inhaler Not Taking  Yes No   Sig: Inhale 2 puffs 2 (two) times a day Rinse mouth after use     Patient not taking: Reported on 2022   fluticasone (VERAMYST) 27 5 MCG/SPRAY nasal spray Not Taking  Yes No   Si sprays into each nostril daily   Patient not taking: Reported on 11/9/2022   furosemide (LASIX) 20 mg tablet Past Month  Yes Yes   Sig: Take 2 tablets by mouth   losartan (COZAAR) 50 mg tablet 4/28/2023  Yes Yes   Sig: Take 50 mg by mouth   metFORMIN (GLUCOPHAGE) 500 mg tablet 4/28/2023  Yes Yes   Sig: Take 1,000 mg by mouth 2 (two) times a day with meals    metoprolol succinate (TOPROL-XL) 50 mg 24 hr tablet 4/28/2023  Yes Yes   Sig: Take 75 mg by mouth daily   rosuvastatin (CRESTOR) 10 MG tablet Not Taking  Yes No   Sig: Take 10 mg by mouth daily   Patient not taking: Reported on 11/9/2022   sildenafil (REVATIO) 20 mg tablet Not Taking  Yes No   Sig: Take 20 mg by mouth   Patient not taking: Reported on 11/9/2022   spironolactone (ALDACTONE) 25 mg tablet Past Month  Yes Yes   Sig: Take 25 mg by mouth daily   tadalafil (CIALIS) 20 MG tablet Not Taking  Yes No   Sig: Take 20 mg by mouth   Patient not taking: Reported on 11/9/2022      Facility-Administered Medications: None       Past Medical History:   Diagnosis Date   • BPH (benign prostatic hyperplasia)    • Diabetes mellitus ([facility] [address] )    • ICD (implantable cardioverter-defibrillator) in place        Past Surgical History:   Procedure Laterality Date   • CHOLECYSTECTOMY         History reviewed  No pertinent family history  I have reviewed and agree with the history as documented  E-Cigarette/Vaping   • E-Cigarette Use Never User      E-Cigarette/Vaping Substances   • Nicotine No    • THC No    • CBD No    • Flavoring No    • Other No    • Unknown No      Social History     Tobacco Use   • Smoking status: Never   • Smokeless tobacco: Never   Vaping Use   • Vaping Use: Never used   Substance Use Topics   • Alcohol use: No   • Drug use: No        Review of Systems   Constitutional: Negative for chills and fever  HENT: Negative for ear pain and sore throat  Eyes: Negative for pain and visual disturbance     Respiratory: Negative for cough and shortness of breath  Cardiovascular: Negative for chest pain and palpitations  Gastrointestinal: Negative for abdominal pain and vomiting  R groin pain   Genitourinary: Negative for dysuria and hematuria  Musculoskeletal: Negative for arthralgias and back pain  Skin: Negative for color change and rash  Neurological: Negative for seizures and syncope  All other systems reviewed and are negative  Physical Exam  ED Triage Vitals [04/28/23 1312]   Temperature Pulse Respirations Blood Pressure SpO2   97 6 °F (36 4 °C) 91 17 128/80 98 %      Temp Source Heart Rate Source Patient Position - Orthostatic VS BP Location FiO2 (%)   Oral Monitor -- Right arm --      Pain Score       5             Orthostatic Vital Signs  Vitals:    04/28/23 1312 04/28/23 1315 04/28/23 1630   BP: 128/80 128/80 122/83   Pulse: 91 105 92       Physical Exam  Vitals and nursing note reviewed  Constitutional:       General: He is not in acute distress  Appearance: He is well-developed  He is obese  HENT:      Head: Normocephalic and atraumatic  Eyes:      Conjunctiva/sclera: Conjunctivae normal    Cardiovascular:      Rate and Rhythm: Normal rate and regular rhythm  Pulses:           Radial pulses are 2+ on the right side and 2+ on the left side  Dorsalis pedis pulses are 2+ on the right side and 2+ on the left side  Heart sounds: Normal heart sounds, S1 normal and S2 normal  No murmur heard  Pulmonary:      Effort: Pulmonary effort is normal  No respiratory distress  Breath sounds: Normal breath sounds and air entry  Abdominal:      General: Bowel sounds are normal       Palpations: Abdomen is soft  Tenderness: There is no abdominal tenderness  Hernia: A hernia is present  Hernia is present in the right inguinal area  Comments: R indirect inguinal hernia, non-reducible with tenderness to palpation  No overlying skin changes  Non-peritoneal abdomen      Musculoskeletal:         General: No swelling  Cervical back: Neck supple  Right lower leg: No edema  Left lower leg: No edema  Skin:     General: Skin is warm and dry  Capillary Refill: Capillary refill takes less than 2 seconds  Neurological:      Mental Status: He is alert     Psychiatric:         Mood and Affect: Mood normal          ED Medications  Medications   ketorolac (TORADOL) injection 15 mg (15 mg Intravenous Given 4/28/23 1436)   iohexol (OMNIPAQUE) 350 MG/ML injection (SINGLE-DOSE) 100 mL (100 mL Intravenous Given 4/28/23 1532)       Diagnostic Studies  Results Reviewed     Procedure Component Value Units Date/Time    Lipase [[id number]]  (Normal) Collected: 04/28/23 1349    Lab Status: Final result Specimen: Blood from Arm, Right Updated: 04/28/23 1446     Lipase 36 u/L     Fingerstick Glucose (POCT) [[id number]]  (Abnormal) Collected: 04/28/23 1422    Lab Status: Final result Updated: 04/28/23 1423     POC Glucose 210 mg/dl     Comprehensive metabolic panel [[id number]]  (Abnormal) Collected: 04/28/23 1349    Lab Status: Final result Specimen: Blood from Arm, Right Updated: 04/28/23 1420     Sodium 133 mmol/L      Potassium 4 2 mmol/L      Chloride 102 mmol/L      CO2 21 mmol/L      ANION GAP 10 mmol/L      BUN 17 mg/dL      Creatinine 0 97 mg/dL      Glucose 248 mg/dL      Calcium 9 3 mg/dL      AST 17 U/L      ALT 25 U/L      Alkaline Phosphatase 77 U/L      Total Protein 6 4 g/dL      Albumin 3 9 g/dL      Total Bilirubin 0 32 mg/dL      eGFR 83 ml/min/1 73sq m     Narrative:      Case guidelines for Chronic Kidney Disease (CKD):   •  Stage 1 with normal or high GFR (GFR > 90 mL/min/1 73 square meters)  •  Stage 2 Mild CKD (GFR = 60-89 mL/min/1 73 square meters)  •  Stage 3A Moderate CKD (GFR = 45-59 mL/min/1 73 square meters)  •  Stage 3B Moderate CKD (GFR = 30-44 mL/min/1 73 square meters)  •  Stage 4 Severe CKD (GFR = 15-29 mL/min/1 73 square meters)  •  Stage 5 End Stage CKD (GFR <15 mL/min/1 73 square meters)  Note: GFR calculation is accurate only with a steady state creatinine    Urine Microscopic [[id number]]  (Normal) Collected: 04/28/23 1351    Lab Status: Final result Specimen: Urine, Clean Catch Updated: 04/28/23 1414     RBC, UA None Seen /hpf      WBC, UA None Seen /hpf      Epithelial Cells Occasional /hpf      Bacteria, UA None Seen /hpf     UA (URINE) with reflex to Scope [[id number]]  (Abnormal) Collected: 04/28/23 1351    Lab Status: Final result Specimen: Urine, Clean Catch Updated: 04/28/23 1412     Color, UA Light Yellow     Clarity, UA Clear     Specific Gravity, UA 1 035     pH, UA 5 0     Leukocytes, UA Elevated glucose may cause decreased leukocyte values   See urine microscopic for [facility] result/     Nitrite, UA Negative     Protein, UA Negative mg/dl      Glucose, UA >=1000 (1%) mg/dl      Ketones, UA Trace mg/dl      Urobilinogen, UA <2 0 mg/dl      Bilirubin, UA Negative     Occult Blood, UA Negative    CBC and differential [[id number]]  (Abnormal) Collected: 04/28/23 1349    Lab Status: Final result Specimen: Blood from Arm, Right Updated: 04/28/23 1404     WBC 4 31 Thousand/uL      RBC 5 38 Million/uL      Hemoglobin 16 2 g/dL      Hematocrit 48 7 %      MCV 91 fL      MCH 30 1 pg      MCHC 33 3 g/dL      RDW 13 0 %      MPV 9 6 fL      Platelets 681 Thousands/uL      nRBC 0 /100 WBCs      Neutrophils Relative 54 %      Immat GRANS % 1 %      Lymphocytes Relative 28 %      Monocytes Relative 10 %      Eosinophils Relative 5 %      Basophils Relative 2 %      Neutrophils Absolute 2 40 Thousands/µL      Immature Grans Absolute 0 03 Thousand/uL      Lymphocytes Absolute 1 19 Thousands/µL      Monocytes Absolute 0 41 Thousand/µL      Eosinophils Absolute 0 20 Thousand/µL      Basophils Absolute 0 08 Thousands/µL                  CT abdomen pelvis with contrast   Final Result by Jose Spencer MD (04/28 1621)   Large indirect right inguinal hernia containing small bowel loops and mesentery without evidence of bowel obstruction   Bilateral hydroceles left greater than right  Enlarged prostate gland      Urinary bladder diverticulum      Umbilical hernia containing fat      Heterogeneity in the liver parenchyma indeterminate favored due to fatty infiltration  However MRI on nonemergent basis suggested for definite characterization to exclude any space-occupying lesion   The study was marked in EPIC for immediate notification  Procedures  Procedures      ED Course  ED Course as of 04/28/23 2238 Fri Apr 28, 2023   1601 General surgery resident Anthony Guzman) texted for expedited outpatient appointment for hernia   1619 Patient reevaluation: Reports improvement in his pain after Toradol  Although his pain is still there, asking for Tylenol  Tylenol ordered  1619 CBC, CMP, UA, lipase are unremarkable   1629 CT ab/pelvis;   IMPRESSION:  Large indirect right inguinal hernia containing small bowel loops and mesentery without evidence of bowel obstruction  Bilateral hydroceles left greater than right      Enlarged prostate gland     Urinary bladder diverticulum     Umbilical hernia containing fat     Heterogeneity in the liver parenchyma indeterminate favored due to fatty infiltration  However MRI on nonemergent basis suggested for definite characterization to exclude any space-occupying lesion  The study was marked in EPIC for immediate notification  5 Pt did not receive his tylenol, would rather take it at home                             SBIRT 22yo+    Flowsheet Row Most Recent Value   Initial Alcohol Screen: US AUDIT-C     1  How often do you have a drink containing alcohol? 0 Filed at: 04/28/2023 1323   2  How many drinks containing alcohol do you have on a typical day you are drinking? 0 Filed at: 04/28/2023 1323   3a  Male UNDER 65: How often do you have five or more drinks on one occasion? 0 Filed at: 04/28/2023 1323   3b   FEMALE Any Age, or MALE 65+: How often do you have 4 or more drinks on one occassion? 0 Filed at: 04/28/2023 1323   Audit-C Score 0 Filed at: 04/28/2023 1323   ELIF: How many times in the past year have you    Used an illegal drug or used a prescription medication for non-medical reasons? Never Filed at: 04/28/2023 1323                Medical Decision Making  20-year-old male present to the ED due to right inguinal hernia pain  Has had a known right indirect inguinal hernia which has been nonreducible for the past few months  No signs or symptoms of strangulation  CT abdomen without strangulation  Patient discharged home with outpatient follow-up and strict return precautions  Case was discussed with general surgery on-call in order to get expedited outpatient follow-up for this patient  Referrals for urology, GI were given for incidental findings on CT  Patient was informed of all incidental findings including fatty liver which may need MRI to rule out tumor  Patient verbalized understanding  Amount and/or Complexity of Data Reviewed  Labs: ordered  Decision-making details documented in ED Course  Radiology: ordered and independent interpretation performed  Decision-making details documented in ED Course  Discussion of management or test interpretation with external provider(s): Case was discussed with general surgery resident in order to expedite outpatient appointments    Risk  Prescription drug management              Disposition  Final diagnoses:   Indirect right inguinal hernia   Bladder diverticulum   Diverticulosis   Atelectasis   Fatty liver   Umbilical hernia   Enlarged prostate   Hydrocele, bilateral     Time reflects when diagnosis was documented in both MDM as applicable and the Disposition within this note     Time User Action Codes Description Comment    4/28/2023  4:23 PM Joyce Summers Add [K40 90] Indirect right inguinal hernia     4/28/2023  4:24 PM Joyce Summers Add [N32 3] Bladder diverticulum 4/28/2023  4:24 PM Veverly Cassette Add [K57 90] Diverticulosis     4/28/2023  4:24 PM Veverly Cassette Add [J98 11] Atelectasis     4/28/2023  4:24 PM Veverly Cassette Add [K76 0] Fatty liver     4/28/2023  4:24 PM Veverly Cassette Add [X92 1] Umbilical hernia     5/14/4171  4:25 PM Veverly Cassette Add [N40 0] Enlarged prostate     4/28/2023  4:25 PM Veverly Cassette Add [N43 3] Hydrocele, bilateral       ED Disposition     ED Disposition   Discharge    Condition   Stable    Date/Time   Fri Apr 28, 2023  4:27 PM    Comment   Christopher Dorman discharge to home/self care                 Follow-up Information     Follow up With Specialties Details Why Contact Seema Clarke, DO General Surgery Follow up please call to schedule appointment regarding hernia [address]  [address] [place] [address]  [phone]            Discharge Medication List as of 4/28/2023  4:28 PM      CONTINUE these medications which have NOT CHANGED    Details   alfuzosin (UROXATRAL) 10 mg 24 hr tablet Take 10 mg by mouth daily, Historical Med      apixaban (ELIQUIS) 5 mg Take 5 mg by mouth 2 (two) times a day, Historical Med      cetirizine (ZyrTEC) 10 mg tablet Take 1 tablet by mouth, Historical Med      cyclobenzaprine (FLEXERIL) 10 mg tablet TAKE 1 TABLET BY MOUTH DAILY AT BEDTIME, Normal      dextromethorphan-guaiFENesin (ROBITUSSIN DM)  mg/5 mL syrup Take 5 mL by mouth 3 (three) times a day as needed for cough, Starting Wed 11/30/2022, Normal      dutasteride (AVODART) 0 5 mg capsule Take 1 capsule by mouth daily, Starting Tue 6/24/2014, Historical Med      Empagliflozin 25 MG TABS Take 25 mg by mouth, Historical Med      furosemide (LASIX) 20 mg tablet Take 2 tablets by mouth, Historical Med      losartan (COZAAR) 50 mg tablet Take 50 mg by mouth, Historical Med      metFORMIN (GLUCOPHAGE) 500 mg tablet Take 1,000 mg by mouth 2 (two) times a day with meals , Historical Med      metoprolol succinate (TOPROL-XL) 50 mg 24 hr tablet Take 75 mg by mouth daily, Historical Med      spironolactone (ALDACTONE) 25 mg tablet Take 25 mg by mouth daily, Historical Med      AMLODIPINE BESYLATE PO Historical Med      aspirin (ECOTRIN LOW STRENGTH) 81 mg EC tablet Take 81 mg by mouth, Historical Med      fluticasone (FLOVENT HFA) 110 MCG/ACT inhaler Inhale 2 puffs 2 (two) times a day Rinse mouth after use , Historical Med      fluticasone (VERAMYST) 27 5 MCG/SPRAY nasal spray 2 sprays into each nostril daily, Historical Med      rosuvastatin (CRESTOR) 10 MG tablet Take 10 mg by mouth daily, Historical Med      sildenafil (REVATIO) 20 mg tablet Take 20 mg by mouth, Starting Thu 2/28/2019, Historical Med      tadalafil (CIALIS) 20 MG tablet Take 20 mg by mouth, Starting Fri 5/19/2017, Historical Med               PDMP Review     None           ED Provider  Attending physically available and evaluated Masoud Irwin  FANTA managed the patient along with the ED Attending      Electronically Signed by         Valerie Elias MD  04/28/23 4398

## 2023-04-28 NOTE — DISCHARGE INSTRUCTIONS
-Return to ED if any nausea, vomiting, worsening abdominal pain, redness or black-colored skin over your hernia, unable to fart, unable to poop   -Can follow-up with Dr Shakira Prince surgery, number is above, please schedule an appointment   -Referrals were placed for gastroenterology for fatty liver as you may need an MRI  -Referral placed for urology for enlarged prostate, bladder diverticulum and hydroceles

## 2023-05-03 ENCOUNTER — PREP FOR PROCEDURE (OUTPATIENT)
Dept: SURGERY | Facility: CLINIC | Age: 63
End: 2023-05-03

## 2023-05-03 ENCOUNTER — CONSULT (OUTPATIENT)
Dept: SURGERY | Facility: CLINIC | Age: 63
End: 2023-05-03

## 2023-05-03 VITALS
DIASTOLIC BLOOD PRESSURE: 93 MMHG | WEIGHT: 263 LBS | HEIGHT: 70 IN | BODY MASS INDEX: 37.65 KG/M2 | SYSTOLIC BLOOD PRESSURE: 140 MMHG | HEART RATE: 107 BPM

## 2023-05-03 DIAGNOSIS — K42.9 UMBILICAL HERNIA: Primary | ICD-10-CM

## 2023-05-03 DIAGNOSIS — K40.90 INGUINAL HERNIA: Primary | ICD-10-CM

## 2023-05-03 DIAGNOSIS — K40.90 INDIRECT RIGHT INGUINAL HERNIA: ICD-10-CM

## 2023-05-03 DIAGNOSIS — K42.9 UMBILICAL HERNIA WITHOUT OBSTRUCTION AND WITHOUT GANGRENE: ICD-10-CM

## 2024-04-13 ENCOUNTER — HOSPITAL ENCOUNTER (EMERGENCY)
Facility: HOSPITAL | Age: 64
Discharge: HOME | End: 2024-04-13
Attending: EMERGENCY MEDICINE
Payer: COMMERCIAL

## 2024-04-13 VITALS
DIASTOLIC BLOOD PRESSURE: 81 MMHG | SYSTOLIC BLOOD PRESSURE: 136 MMHG | OXYGEN SATURATION: 99 % | TEMPERATURE: 97.8 F | BODY MASS INDEX: 37.07 KG/M2 | WEIGHT: 258.38 LBS | HEART RATE: 92 BPM | RESPIRATION RATE: 18 BRPM

## 2024-04-13 DIAGNOSIS — R10.31 GROIN PAIN, CHRONIC, RIGHT: ICD-10-CM

## 2024-04-13 DIAGNOSIS — G89.29 GROIN PAIN, CHRONIC, RIGHT: ICD-10-CM

## 2024-04-13 DIAGNOSIS — R31.9 HEMATURIA: Primary | ICD-10-CM

## 2024-04-13 DIAGNOSIS — N39.0 UTI (URINARY TRACT INFECTION): ICD-10-CM

## 2024-04-13 LAB
ALBUMIN SERPL BCP-MCNC: 4 G/DL (ref 3.5–5)
ALP SERPL-CCNC: 73 U/L (ref 34–104)
ALT SERPL W P-5'-P-CCNC: 18 U/L (ref 7–52)
ANION GAP SERPL CALCULATED.3IONS-SCNC: 9 MMOL/L (ref 4–13)
APTT PPP: 29 SECONDS (ref 23–37)
AST SERPL W P-5'-P-CCNC: 15 U/L (ref 13–39)
BACTERIA UR QL AUTO: ABNORMAL /HPF
BASOPHILS # BLD AUTO: 0.06 THOUSANDS/ÂΜL (ref 0–0.1)
BASOPHILS NFR BLD AUTO: 1 % (ref 0–1)
BILIRUB SERPL-MCNC: 0.38 MG/DL (ref 0.2–1)
BILIRUB UR QL STRIP: NEGATIVE
BUN SERPL-MCNC: 14 MG/DL (ref 5–25)
CALCIUM SERPL-MCNC: 9.5 MG/DL (ref 8.4–10.2)
CHLORIDE SERPL-SCNC: 103 MMOL/L (ref 96–108)
CLARITY UR: ABNORMAL
CO2 SERPL-SCNC: 22 MMOL/L (ref 21–32)
COLOR UR: ABNORMAL
CREAT SERPL-MCNC: 0.84 MG/DL (ref 0.6–1.3)
EOSINOPHIL # BLD AUTO: 0.22 THOUSAND/ÂΜL (ref 0–0.61)
EOSINOPHIL NFR BLD AUTO: 4 % (ref 0–6)
ERYTHROCYTE [DISTWIDTH] IN BLOOD BY AUTOMATED COUNT: 13.3 % (ref 11.6–15.1)
GFR SERPL CREATININE-BSD FRML MDRD: 93 ML/MIN/1.73SQ M
GLUCOSE SERPL-MCNC: 138 MG/DL (ref 65–140)
GLUCOSE UR STRIP-MCNC: ABNORMAL MG/DL
HCT VFR BLD AUTO: 48.5 % (ref 36.5–49.3)
HGB BLD-MCNC: 16.4 G/DL (ref 12–17)
HGB UR QL STRIP.AUTO: ABNORMAL
IMM GRANULOCYTES # BLD AUTO: 0.05 THOUSAND/UL (ref 0–0.2)
IMM GRANULOCYTES NFR BLD AUTO: 1 % (ref 0–2)
INR PPP: 0.95 (ref 0.84–1.19)
KETONES UR STRIP-MCNC: ABNORMAL MG/DL
LEUKOCYTE ESTERASE UR QL STRIP: ABNORMAL
LYMPHOCYTES # BLD AUTO: 1.68 THOUSANDS/ÂΜL (ref 0.6–4.47)
LYMPHOCYTES NFR BLD AUTO: 34 % (ref 14–44)
MCH RBC QN AUTO: 30.1 PG (ref 26.8–34.3)
MCHC RBC AUTO-ENTMCNC: 33.8 G/DL (ref 31.4–37.4)
MCV RBC AUTO: 89 FL (ref 82–98)
MONOCYTES # BLD AUTO: 0.56 THOUSAND/ÂΜL (ref 0.17–1.22)
MONOCYTES NFR BLD AUTO: 11 % (ref 4–12)
MUCOUS THREADS UR QL AUTO: ABNORMAL
NEUTROPHILS # BLD AUTO: 2.39 THOUSANDS/ÂΜL (ref 1.85–7.62)
NEUTS SEG NFR BLD AUTO: 49 % (ref 43–75)
NITRITE UR QL STRIP: NEGATIVE
NON-SQ EPI CELLS URNS QL MICRO: ABNORMAL /HPF
NRBC BLD AUTO-RTO: 0 /100 WBCS
PH UR STRIP.AUTO: 5.5 [PH]
PLATELET # BLD AUTO: 209 THOUSANDS/UL (ref 149–390)
PMV BLD AUTO: 9.2 FL (ref 8.9–12.7)
POTASSIUM SERPL-SCNC: 4.1 MMOL/L (ref 3.5–5.3)
PROT SERPL-MCNC: 6.7 G/DL (ref 6.4–8.4)
PROT UR STRIP-MCNC: ABNORMAL MG/DL
PROTHROMBIN TIME: 13.2 SECONDS (ref 11.6–14.5)
RBC # BLD AUTO: 5.44 MILLION/UL (ref 3.88–5.62)
RBC #/AREA URNS AUTO: ABNORMAL /HPF
SODIUM SERPL-SCNC: 134 MMOL/L (ref 135–147)
SP GR UR STRIP.AUTO: 1.03 (ref 1–1.03)
UROBILINOGEN UR STRIP-ACNC: <2 MG/DL
WBC # BLD AUTO: 4.96 THOUSAND/UL (ref 4.31–10.16)
WBC #/AREA URNS AUTO: ABNORMAL /HPF

## 2024-04-13 PROCEDURE — 96360 HYDRATION IV INFUSION INIT: CPT

## 2024-04-13 PROCEDURE — 81001 URINALYSIS AUTO W/SCOPE: CPT | Performed by: PHYSICIAN ASSISTANT

## 2024-04-13 PROCEDURE — 85025 COMPLETE CBC W/AUTO DIFF WBC: CPT | Performed by: PHYSICIAN ASSISTANT

## 2024-04-13 PROCEDURE — 99284 EMERGENCY DEPT VISIT MOD MDM: CPT | Performed by: PHYSICIAN ASSISTANT

## 2024-04-13 PROCEDURE — 85610 PROTHROMBIN TIME: CPT | Performed by: PHYSICIAN ASSISTANT

## 2024-04-13 PROCEDURE — 36415 COLL VENOUS BLD VENIPUNCTURE: CPT | Performed by: PHYSICIAN ASSISTANT

## 2024-04-13 PROCEDURE — 85730 THROMBOPLASTIN TIME PARTIAL: CPT | Performed by: PHYSICIAN ASSISTANT

## 2024-04-13 PROCEDURE — 99284 EMERGENCY DEPT VISIT MOD MDM: CPT

## 2024-04-13 PROCEDURE — 80053 COMPREHEN METABOLIC PANEL: CPT | Performed by: PHYSICIAN ASSISTANT

## 2024-04-13 RX ORDER — CEPHALEXIN 500 MG/1
500 CAPSULE ORAL EVERY 12 HOURS SCHEDULED
Qty: 10 CAPSULE | Refills: 0 | Status: SHIPPED | OUTPATIENT
Start: 2024-04-13 | End: 2024-04-18

## 2024-04-13 RX ADMIN — SODIUM CHLORIDE 500 ML: 0.9 INJECTION, SOLUTION INTRAVENOUS at 12:18

## 2024-04-13 NOTE — ED PROVIDER NOTES
History  Chief Complaint   Patient presents with    Blood in Urine     Patient has been experiencing blood in urine for a month. Worsening this week to darker color of blood in urine. Hx of enlarged prostate     This 63-year-old male presents emergency room with a 1 month history blood in his urine.  He states it is intermittent.  He states that he will have 2 to 3 days worth of blood and then he will not have any for couple days.  States over the past week he has had basilio cranberry juice cocktail appearing urine.  Today it is just dark.  He denies any fever or chills.  He is on Eliquis for atrial fibrillation.  He also takes 81 mg of aspirin daily.  He has not noticed any excessive bruising.  He denies any bleeding of his gums when he brushes his teeth.  He denies any flank pain or abdominal pain.  He denies any dysuria, frequency, urgency.  He complains of occasional pain in his groin since he had an inguinal hernia repair back in October.  He has no present pain today.  He denies any testicle pain but states occasionally it will shoot into his scrotum.  He denies any back pain.  Denies any numbness or tingling or incontinence of urine.      Past medical history is positive for atrial fibrillation,cardiomyopathy, BPH, coronary artery disease, diabetes mellitus, implantable cardiac defibrillator, obesity      History provided by:  Patient  Blood in Urine  This is a new problem. The current episode started 1 to 4 weeks ago. The problem has been waxing and waning since onset. He describes the hematuria as gross hematuria. The hematuria occurs throughout his entire urinary stream. He reports no clotting in his urine stream. His pain is at a severity of 0/10. He is experiencing no pain. Urine color: Tea colored to cranberry juice cocktail color. Irritative symptoms do not include frequency, nocturia or urgency. Obstructive symptoms do not include dribbling, incomplete emptying, an intermittent stream, a slower stream,  straining or a weak stream. Associated symptoms include abdominal pain and weight loss. Pertinent negatives include no bladder pain, bone pain, chills, dysuria, facial swelling, fever, flank pain, genital pain, hematospermia, hesitancy, inability to urinate, nausea, urinary retention or vomiting. He is sexually active. His past medical history is significant for BPH. There is no history of  trauma, hypertension, kidney stones, prostatitis, recent infection, sickle cell disease, STDs or tobacco use. Risk factors include anticoagulant and aspirin.       Prior to Admission Medications   Prescriptions Last Dose Informant Patient Reported? Taking?   AMLODIPINE BESYLATE PO   Yes No   Empagliflozin 25 MG TABS  Self Yes No   Sig: Take 25 mg by mouth   alfuzosin (UROXATRAL) 10 mg 24 hr tablet  Self Yes No   Sig: Take 10 mg by mouth daily   apixaban (ELIQUIS) 5 mg   Yes No   Sig: Take 5 mg by mouth 2 (two) times a day   aspirin (ECOTRIN LOW STRENGTH) 81 mg EC tablet   Yes No   Sig: Take 81 mg by mouth   Patient not taking: Reported on 2023   cetirizine (ZyrTEC) 10 mg tablet   Yes No   Sig: Take 1 tablet by mouth   cyclobenzaprine (FLEXERIL) 10 mg tablet   No No   Sig: TAKE 1 TABLET BY MOUTH DAILY AT BEDTIME   dextromethorphan-guaiFENesin (ROBITUSSIN DM)  mg/5 mL syrup   No No   Sig: Take 5 mL by mouth 3 (three) times a day as needed for cough   dutasteride (AVODART) 0.5 mg capsule  Self Yes No   Sig: Take 1 capsule by mouth daily   fluticasone (FLOVENT HFA) 110 MCG/ACT inhaler   Yes No   Sig: Inhale 2 puffs 2 (two) times a day Rinse mouth after use.   Patient not taking: Reported on 2022   fluticasone (VERAMYST) 27.5 MCG/SPRAY nasal spray   Yes No   Si sprays into each nostril daily   Patient not taking: Reported on 2022   furosemide (LASIX) 20 mg tablet   Yes No   Sig: Take 2 tablets by mouth   losartan (COZAAR) 50 mg tablet   Yes No   Sig: Take 50 mg by mouth   metFORMIN (GLUCOPHAGE) 500 mg tablet   Self Yes No   Sig: Take 1,000 mg by mouth 2 (two) times a day with meals    metoprolol succinate (TOPROL-XL) 50 mg 24 hr tablet   Yes No   Sig: Take 75 mg by mouth daily   rosuvastatin (CRESTOR) 10 MG tablet   Yes No   Sig: Take 10 mg by mouth daily   Patient not taking: Reported on 11/9/2022   sildenafil (REVATIO) 20 mg tablet   Yes No   Sig: Take 20 mg by mouth   Patient not taking: Reported on 11/9/2022   spironolactone (ALDACTONE) 25 mg tablet   Yes No   Sig: Take 25 mg by mouth daily   tadalafil (CIALIS) 20 MG tablet   Yes No   Sig: Take 20 mg by mouth   Patient not taking: Reported on 11/9/2022      Facility-Administered Medications: None       Past Medical History:   Diagnosis Date    BPH (benign prostatic hyperplasia)     Coronary artery disease 9/1/2021    Diabetes mellitus (HCC)     ICD (implantable cardioverter-defibrillator) in place     Obesity        Past Surgical History:   Procedure Laterality Date    CHOLECYSTECTOMY         History reviewed. No pertinent family history.  I have reviewed and agree with the history as documented.    E-Cigarette/Vaping    E-Cigarette Use Never User      E-Cigarette/Vaping Substances    Nicotine No     THC No     CBD No     Flavoring No     Other No     Unknown No      Social History     Tobacco Use    Smoking status: Never    Smokeless tobacco: Never   Vaping Use    Vaping status: Never Used   Substance Use Topics    Alcohol use: No    Drug use: No       Review of Systems   Constitutional:  Positive for weight loss. Negative for activity change, appetite change, chills, diaphoresis, fatigue and fever.   HENT:  Negative for congestion, facial swelling, postnasal drip, rhinorrhea and sore throat.    Respiratory:  Negative for chest tightness and shortness of breath.    Cardiovascular:  Negative for leg swelling.   Gastrointestinal:  Positive for abdominal pain. Negative for nausea and vomiting.        Right groin pain   Genitourinary:  Positive for hematuria. Negative  for dysuria, flank pain, frequency, hesitancy, incomplete emptying, nocturia, testicular pain and urgency.   Musculoskeletal:  Negative for back pain, neck pain and neck stiffness.   Skin:  Negative for color change, pallor and rash.   Psychiatric/Behavioral:  Negative for confusion.    All other systems reviewed and are negative.      Physical Exam  Physical Exam  Vitals and nursing note reviewed.   Constitutional:       General: He is not in acute distress.     Appearance: Normal appearance. He is obese. He is not ill-appearing, toxic-appearing or diaphoretic.   HENT:      Head: Normocephalic and atraumatic.      Right Ear: External ear normal. There is no impacted cerumen.      Left Ear: External ear normal. There is no impacted cerumen.      Nose: No congestion or rhinorrhea.   Cardiovascular:      Rate and Rhythm: Normal rate and regular rhythm.      Heart sounds: Normal heart sounds.   Pulmonary:      Effort: Pulmonary effort is normal.      Breath sounds: Normal breath sounds.   Abdominal:      General: There is distension.      Tenderness: There is no abdominal tenderness. There is no guarding or rebound.   Musculoskeletal:      Right lower leg: No edema.      Left lower leg: No edema.   Skin:     General: Skin is warm.      Capillary Refill: Capillary refill takes less than 2 seconds.   Neurological:      General: No focal deficit present.      Mental Status: He is alert and oriented to person, place, and time.   Psychiatric:         Mood and Affect: Mood normal.         Behavior: Behavior normal.         Thought Content: Thought content normal.         Judgment: Judgment normal.         Vital Signs  ED Triage Vitals [04/13/24 1110]   Temperature Pulse Respirations Blood Pressure SpO2   97.8 °F (36.6 °C) 92 18 136/81 99 %      Temp Source Heart Rate Source Patient Position - Orthostatic VS BP Location FiO2 (%)   Oral Monitor Sitting Right arm --      Pain Score       No Pain           Vitals:    04/13/24  1110   BP: 136/81   Pulse: 92   Patient Position - Orthostatic VS: Sitting         Visual Acuity      ED Medications  Medications   sodium chloride 0.9 % bolus 500 mL (0 mL Intravenous Stopped 4/13/24 1300)       Diagnostic Studies  Results Reviewed       Procedure Component Value Units Date/Time    Urine Microscopic [[id number]]  (Abnormal) Collected: 04/13/24 1147    Lab Status: Final result Specimen: Urine, Other Updated: 04/13/24 1450     RBC, UA Innumerable /hpf      WBC, UA 1-2 /hpf      Epithelial Cells None Seen /hpf      Bacteria, UA None Seen /hpf      MUCUS THREADS Occasional    UA w Reflex to Microscopic w Reflex to Culture [[id number]]  (Abnormal) Collected: 04/13/24 1147    Lab Status: Final result Specimen: Urine, Other Updated: 04/13/24 1222     Color, UA Light Orange     Clarity, UA Turbid     Specific Gravity, UA 1.035     pH, UA 5.5     Leukocytes, UA Elevated glucose may cause decreased leukocyte values. See urine microscopic for UWBC result     Nitrite, UA Negative     Protein, UA 70 (1+) mg/dl      Glucose, UA >=1000 (1%) mg/dl      Ketones, UA Trace mg/dl      Urobilinogen, UA <2.0 mg/dl      Bilirubin, UA Negative     Occult Blood, UA Large    Comprehensive metabolic panel [[id number]]  (Abnormal) Collected: 04/13/24 1147    Lab Status: Final result Specimen: Blood from Arm, Right Updated: 04/13/24 1222     Sodium 134 mmol/L      Potassium 4.1 mmol/L      Chloride 103 mmol/L      CO2 22 mmol/L      ANION GAP 9 mmol/L      BUN 14 mg/dL      Creatinine 0.84 mg/dL      Glucose 138 mg/dL      Calcium 9.5 mg/dL      AST 15 U/L      ALT 18 U/L      Alkaline Phosphatase 73 U/L      Total Protein 6.7 g/dL      Albumin 4.0 g/dL      Total Bilirubin 0.38 mg/dL      eGFR 93 ml/min/1.73sq m     Narrative:      [employer] guidelines for Chronic Kidney Disease (CKD):     Stage 1 with normal or high GFR (GFR > 90 mL/min/1.73 square meters)    Stage 2 Mild CKD (GFR = 60-89 mL/min/1.73  square meters)    Stage 3A Moderate CKD (GFR = 45-59 mL/min/1.73 square meters)    Stage 3B Moderate CKD (GFR = 30-44 mL/min/1.73 square meters)    Stage 4 Severe CKD (GFR = 15-29 mL/min/1.73 square meters)    Stage 5 End Stage CKD (GFR <15 mL/min/1.73 square meters)  Note: GFR calculation is accurate only with a steady state creatinine    Protime-INR [[id number]]  (Normal) Collected: 04/13/24 1147    Lab Status: Final result Specimen: Blood from Arm, Right Updated: 04/13/24 1222     Protime 13.2 seconds      INR 0.95    APTT [[id number]]  (Normal) Collected: 04/13/24 1147    Lab Status: Final result Specimen: Blood from Arm, Right Updated: 04/13/24 1222     PTT 29 seconds     CBC and differential [[id number]] Collected: 04/13/24 1147    Lab Status: Final result Specimen: Blood from Arm, Right Updated: 04/13/24 1205     WBC 4.96 Thousand/uL      RBC 5.44 Million/uL      Hemoglobin 16.4 g/dL      Hematocrit 48.5 %      MCV 89 fL      MCH 30.1 pg      MCHC 33.8 g/dL      RDW 13.3 %      MPV 9.2 fL      Platelets 209 Thousands/uL      nRBC 0 /100 WBCs      Segmented % 49 %      Immature Grans % 1 %      Lymphocytes % 34 %      Monocytes % 11 %      Eosinophils Relative 4 %      Basophils Relative 1 %      Absolute Neutrophils 2.39 Thousands/µL      Absolute Immature Grans 0.05 Thousand/uL      Absolute Lymphocytes 1.68 Thousands/µL      Absolute Monocytes 0.56 Thousand/µL      Eosinophils Absolute 0.22 Thousand/µL      Basophils Absolute 0.06 Thousands/µL                    No orders to display              Procedures  Procedures         ED Course                               SBIRT 20yo+      Flowsheet Row Most Recent Value   Initial Alcohol Screen: US AUDIT-C     1. How often do you have a drink containing alcohol? 0 Filed at: 04/13/2024 1112   2. How many drinks containing alcohol do you have on a typical day you are drinking?  0 Filed at: 04/13/2024 1112   3a. Male UNDER 65: How often do you have five or more drinks  on one occasion? 0 Filed at: 04/13/2024 1112   3b. FEMALE Any Age, or MALE 65+: How often do you have 4 or more drinks on one occassion? 0 Filed at: 04/13/2024 1112   Audit-C Score 0 Filed at: 04/13/2024 1112   ELIF: How many times in the past year have you...    Used an illegal drug or used a prescription medication for non-medical reasons? Never Filed at: 04/13/2024 1112                      Medical Decision Making  Patient presented to the emergency room with a 1 month history of blood in his urine.  He states has been intermittent throughout the month but has been present maybe getting progressively worse.  Patient is on Eliquis for atrial fibrillation.  He also takes a baby aspirin.  He was seen and evaluated.  He had a normal exam.  His urine was a dark tea color.  Laboratory studies were taken and were reviewed.  He does have a positive urinary tract infection.  His urine did test positive for blood.  He also was complaining of some groin pain that has been present since his hernia repair 6 months ago.  Patient was treated with Keflex 500 mg twice daily for 5 days.  He was given a referral to follow-up with the patient's urologist that he sees for his benign prostatic hypertrophy.  He is also going to follow-up with the surgeon who did his inguinal hernia repair.  He will call and make an appointment.    Amount and/or Complexity of Data Reviewed  Labs: ordered.     Details: Independently interpreted by me    Risk  Prescription drug management.             Disposition  Final diagnoses:   Hematuria   UTI (urinary tract infection)   Groin pain, chronic, right     Time reflects when diagnosis was documented in both MDM as applicable and the Disposition within this note       Time User Action Codes Description Comment    4/13/2024  1:14 PM Gutzweiler, Julie Add [R31.9] Hematuria     4/13/2024  1:14 PM Gutzweiler, Julie Add [N39.0] UTI (urinary tract infection)     4/13/2024  1:15 PM Gutzweiler, Julie Add [R10.31,   G89.29] Groin pain, chronic, right           ED Disposition       ED Disposition   Discharge    Condition   Stable    Date/Time   Sat Apr 13, 2024 1314    Comment   Dexter Reyes discharge to home/self care.                   Follow-up Information       Follow up With Specialties Details Why Contact Info    Your surgeon  Schedule an appointment as soon as possible for a visit   Please follow-up with your surgeon who repaired your right inguinal hernia for recheck.    Your urologist  Schedule an appointment as soon as possible for a visit   Please call your urologist for follow-up appointment.  You may require cystoscopy.  This will be up to the discretion pending your results.  Urinalysis and lab work.            Discharge Medication List as of 4/13/2024  1:21 PM        START taking these medications    Details   cephalexin (KEFLEX) 500 mg capsule Take 1 capsule (500 mg total) by mouth every 12 (twelve) hours for 5 days, Starting Sat 4/13/2024, Until Thu 4/18/2024, Normal           CONTINUE these medications which have NOT CHANGED    Details   alfuzosin (UROXATRAL) 10 mg 24 hr tablet Take 10 mg by mouth daily, Historical Med      AMLODIPINE BESYLATE PO Historical Med      apixaban (ELIQUIS) 5 mg Take 5 mg by mouth 2 (two) times a day, Historical Med      aspirin (ECOTRIN LOW STRENGTH) 81 mg EC tablet Take 81 mg by mouth, Historical Med      cetirizine (ZyrTEC) 10 mg tablet Take 1 tablet by mouth, Historical Med      cyclobenzaprine (FLEXERIL) 10 mg tablet TAKE 1 TABLET BY MOUTH DAILY AT BEDTIME, Normal      dextromethorphan-guaiFENesin (ROBITUSSIN DM)  mg/5 mL syrup Take 5 mL by mouth 3 (three) times a day as needed for cough, Starting Wed 11/30/2022, Normal      dutasteride (AVODART) 0.5 mg capsule Take 1 capsule by mouth daily, Starting Tue 6/24/2014, Historical Med      Empagliflozin 25 MG TABS Take 25 mg by mouth, Historical Med      fluticasone (FLOVENT HFA) 110 MCG/ACT inhaler Inhale 2 puffs 2 (two)  times a day Rinse mouth after use., Historical Med      fluticasone (VERAMYST) 27.5 MCG/SPRAY nasal spray 2 sprays into each nostril daily, Historical Med      furosemide (LASIX) 20 mg tablet Take 2 tablets by mouth, Historical Med      losartan (COZAAR) 50 mg tablet Take 50 mg by mouth, Historical Med      metFORMIN (GLUCOPHAGE) 500 mg tablet Take 1,000 mg by mouth 2 (two) times a day with meals , Historical Med      metoprolol succinate (TOPROL-XL) 50 mg 24 hr tablet Take 75 mg by mouth daily, Historical Med      rosuvastatin (CRESTOR) 10 MG tablet Take 10 mg by mouth daily, Historical Med      sildenafil (REVATIO) 20 mg tablet Take 20 mg by mouth, Starting Thu 2/28/2019, Historical Med      spironolactone (ALDACTONE) 25 mg tablet Take 25 mg by mouth daily, Historical Med      tadalafil (CIALIS) 20 MG tablet Take 20 mg by mouth, Starting Fri 5/19/2017, Historical Med             No discharge procedures on file.    PDMP Review       None            ED Provider  Electronically Signed by             Julie Lynn Gutzweiler, PA-C  04/13/24 2003

## 2024-04-18 ENCOUNTER — HOSPITAL ENCOUNTER (EMERGENCY)
Facility: HOSPITAL | Age: 64
Discharge: HOME | End: 2024-04-18
Attending: STUDENT IN AN ORGANIZED HEALTH CARE EDUCATION/TRAINING PROGRAM
Payer: COMMERCIAL

## 2024-04-18 ENCOUNTER — APPOINTMENT (EMERGENCY)
Dept: CT IMAGING | Facility: HOSPITAL | Age: 64
End: 2024-04-18
Payer: COMMERCIAL

## 2024-04-18 VITALS
SYSTOLIC BLOOD PRESSURE: 135 MMHG | DIASTOLIC BLOOD PRESSURE: 91 MMHG | OXYGEN SATURATION: 97 % | HEART RATE: 86 BPM | RESPIRATION RATE: 18 BRPM | TEMPERATURE: 97.9 F

## 2024-04-18 DIAGNOSIS — N32.3 BLADDER DIVERTICULUM: ICD-10-CM

## 2024-04-18 DIAGNOSIS — R31.9 HEMATURIA: Primary | ICD-10-CM

## 2024-04-18 DIAGNOSIS — N40.0 BPH (BENIGN PROSTATIC HYPERPLASIA): ICD-10-CM

## 2024-04-18 DIAGNOSIS — K40.90 RIGHT INGUINAL HERNIA: ICD-10-CM

## 2024-04-18 LAB
ALBUMIN SERPL BCP-MCNC: 4.2 G/DL (ref 3.5–5)
ALP SERPL-CCNC: 63 U/L (ref 34–104)
ALT SERPL W P-5'-P-CCNC: 17 U/L (ref 7–52)
ANION GAP SERPL CALCULATED.3IONS-SCNC: 9 MMOL/L (ref 4–13)
APTT PPP: 26 SECONDS (ref 23–37)
AST SERPL W P-5'-P-CCNC: 15 U/L (ref 13–39)
BACTERIA UR QL AUTO: ABNORMAL /HPF
BASOPHILS # BLD AUTO: 0.06 THOUSANDS/ÂΜL (ref 0–0.1)
BASOPHILS NFR BLD AUTO: 1 % (ref 0–1)
BILIRUB SERPL-MCNC: 0.5 MG/DL (ref 0.2–1)
BILIRUB UR QL STRIP: NEGATIVE
BUN SERPL-MCNC: 19 MG/DL (ref 5–25)
CALCIUM SERPL-MCNC: 9.2 MG/DL (ref 8.4–10.2)
CHLORIDE SERPL-SCNC: 103 MMOL/L (ref 96–108)
CLARITY UR: CLEAR
CO2 SERPL-SCNC: 23 MMOL/L (ref 21–32)
COLOR UR: ABNORMAL
CREAT SERPL-MCNC: 0.9 MG/DL (ref 0.6–1.3)
EOSINOPHIL # BLD AUTO: 0.15 THOUSAND/ÂΜL (ref 0–0.61)
EOSINOPHIL NFR BLD AUTO: 3 % (ref 0–6)
ERYTHROCYTE [DISTWIDTH] IN BLOOD BY AUTOMATED COUNT: 13.4 % (ref 11.6–15.1)
GFR SERPL CREATININE-BSD FRML MDRD: 91 ML/MIN/1.73SQ M
GLUCOSE SERPL-MCNC: 105 MG/DL (ref 65–140)
GLUCOSE UR STRIP-MCNC: ABNORMAL MG/DL
HCT VFR BLD AUTO: 50.4 % (ref 36.5–49.3)
HGB BLD-MCNC: 16.6 G/DL (ref 12–17)
HGB UR QL STRIP.AUTO: ABNORMAL
IMM GRANULOCYTES # BLD AUTO: 0.04 THOUSAND/UL (ref 0–0.2)
IMM GRANULOCYTES NFR BLD AUTO: 1 % (ref 0–2)
INR PPP: 0.99 (ref 0.84–1.19)
KETONES UR STRIP-MCNC: ABNORMAL MG/DL
LACTATE SERPL-SCNC: 1.8 MMOL/L (ref 0.5–2)
LEUKOCYTE ESTERASE UR QL STRIP: ABNORMAL
LYMPHOCYTES # BLD AUTO: 1.71 THOUSANDS/ÂΜL (ref 0.6–4.47)
LYMPHOCYTES NFR BLD AUTO: 31 % (ref 14–44)
MCH RBC QN AUTO: 29.6 PG (ref 26.8–34.3)
MCHC RBC AUTO-ENTMCNC: 32.9 G/DL (ref 31.4–37.4)
MCV RBC AUTO: 90 FL (ref 82–98)
MONOCYTES # BLD AUTO: 0.56 THOUSAND/ÂΜL (ref 0.17–1.22)
MONOCYTES NFR BLD AUTO: 10 % (ref 4–12)
NEUTROPHILS # BLD AUTO: 2.99 THOUSANDS/ÂΜL (ref 1.85–7.62)
NEUTS SEG NFR BLD AUTO: 54 % (ref 43–75)
NITRITE UR QL STRIP: NEGATIVE
NON-SQ EPI CELLS URNS QL MICRO: ABNORMAL /HPF
NRBC BLD AUTO-RTO: 0 /100 WBCS
PH UR STRIP.AUTO: 6.5 [PH]
PLATELET # BLD AUTO: 250 THOUSANDS/UL (ref 149–390)
PMV BLD AUTO: 8.9 FL (ref 8.9–12.7)
POTASSIUM SERPL-SCNC: 4.2 MMOL/L (ref 3.5–5.3)
PROT SERPL-MCNC: 6.8 G/DL (ref 6.4–8.4)
PROT UR STRIP-MCNC: ABNORMAL MG/DL
PROTHROMBIN TIME: 13.7 SECONDS (ref 11.6–14.5)
RBC # BLD AUTO: 5.6 MILLION/UL (ref 3.88–5.62)
RBC #/AREA URNS AUTO: ABNORMAL /HPF
SODIUM SERPL-SCNC: 135 MMOL/L (ref 135–147)
SP GR UR STRIP.AUTO: 1.04 (ref 1–1.03)
UROBILINOGEN UR STRIP-ACNC: <2 MG/DL
WBC # BLD AUTO: 5.51 THOUSAND/UL (ref 4.31–10.16)
WBC #/AREA URNS AUTO: ABNORMAL /HPF

## 2024-04-18 PROCEDURE — 85610 PROTHROMBIN TIME: CPT | Performed by: STUDENT IN AN ORGANIZED HEALTH CARE EDUCATION/TRAINING PROGRAM

## 2024-04-18 PROCEDURE — 85025 COMPLETE CBC W/AUTO DIFF WBC: CPT | Performed by: STUDENT IN AN ORGANIZED HEALTH CARE EDUCATION/TRAINING PROGRAM

## 2024-04-18 PROCEDURE — 36415 COLL VENOUS BLD VENIPUNCTURE: CPT | Performed by: STUDENT IN AN ORGANIZED HEALTH CARE EDUCATION/TRAINING PROGRAM

## 2024-04-18 PROCEDURE — 99283 EMERGENCY DEPT VISIT LOW MDM: CPT

## 2024-04-18 PROCEDURE — 83605 ASSAY OF LACTIC ACID: CPT | Performed by: STUDENT IN AN ORGANIZED HEALTH CARE EDUCATION/TRAINING PROGRAM

## 2024-04-18 PROCEDURE — 81001 URINALYSIS AUTO W/SCOPE: CPT | Performed by: STUDENT IN AN ORGANIZED HEALTH CARE EDUCATION/TRAINING PROGRAM

## 2024-04-18 PROCEDURE — 85730 THROMBOPLASTIN TIME PARTIAL: CPT | Performed by: STUDENT IN AN ORGANIZED HEALTH CARE EDUCATION/TRAINING PROGRAM

## 2024-04-18 PROCEDURE — 80053 COMPREHEN METABOLIC PANEL: CPT | Performed by: STUDENT IN AN ORGANIZED HEALTH CARE EDUCATION/TRAINING PROGRAM

## 2024-04-18 PROCEDURE — 74177 CT ABD & PELVIS W/CONTRAST: CPT

## 2024-04-18 RX ADMIN — IOHEXOL 85 ML: 350 INJECTION, SOLUTION INTRAVENOUS at 16:31

## 2024-04-18 NOTE — ED PROCEDURE NOTE
Procedure  POC Renal US    Date/Time: 4/18/2024 4:20 PM    Performed by: Thom Owens DO  Authorized by: Thom Owens DO    Patient location:  ED  Performed by:  Resident  Other Assisting Provider: Yes (comment) (Priya Rushing DO, Tapan Sandoval DO, Beny Dixon MD)    Procedure details:     Exam Type:  Educational and diagnostic    Indications: hematuria      Assessment for:  Bladder volume and suspected hydronephrosis    Views obtained: bladder (transverse and sagittal), left kidney and right kidney      Image quality: diagnostic      Image availability:  Images available in PACS and video obtained  Findings:     LEFT kidney findings: unremarkable      LEFT hydronephrosis: none      RIGHT kidney findings: unremarkable      RIGHT hydronephrosis: none      Bladder:  Visualized  Interpretation:     Renal ultrasound impressions: normal exam                     Thom Owens DO  04/18/24 1627

## 2024-04-18 NOTE — DISCHARGE INSTRUCTIONS
Follow up with your urologist and general surgeon regarding inguinal hernia and blood in urine. Needs cystoscopy to rule out bladder tumor.

## 2024-04-18 NOTE — Clinical Note
Dexter Reyes was seen and treated in our emergency department on 4/18/2024.    No restrictions            Diagnosis:     Dexter  may return to work on return date.    He may return on this date: 04/19/2024         If you have any questions or concerns, please don't hesitate to call.      Reese Hansen, DO    ______________________________           _______________          _______________  Hospital Representative                              Date                                Time

## 2024-04-18 NOTE — ED PROVIDER NOTES
History  Chief Complaint   Patient presents with    Blood in Urine     Patient was seen last Saturday for blood in urine was sent home on abx the blood cleared up but began again last night. Patient complains of R groin pain (where previous hernia was). Pain worse with urination      63-year-old male presents to the ED for evaluation of right groin pain, hematuria.  Has had ongoing issues with hematuria.  Most recently about a week ago was seen in the ED and started on antibiotics.  Hematuria cleared up up until yesterday when he had return.  Reports associated right groin pain that worsens when bearing down to urinate.  Does have history of right inguinal hernia and states that it feels similar.  No fevers.  No nausea or vomiting.        Prior to Admission Medications   Prescriptions Last Dose Informant Patient Reported? Taking?   AMLODIPINE BESYLATE PO   Yes No   Empagliflozin 25 MG TABS  Self Yes No   Sig: Take 25 mg by mouth   alfuzosin (UROXATRAL) 10 mg 24 hr tablet  Self Yes No   Sig: Take 10 mg by mouth daily   apixaban (ELIQUIS) 5 mg   Yes No   Sig: Take 5 mg by mouth 2 (two) times a day   aspirin (ECOTRIN LOW STRENGTH) 81 mg EC tablet   Yes No   Sig: Take 81 mg by mouth   Patient not taking: Reported on 4/28/2023   cephalexin (KEFLEX) 500 mg capsule   No No   Sig: Take 1 capsule (500 mg total) by mouth every 12 (twelve) hours for 5 days   cetirizine (ZyrTEC) 10 mg tablet   Yes No   Sig: Take 1 tablet by mouth   cyclobenzaprine (FLEXERIL) 10 mg tablet   No No   Sig: TAKE 1 TABLET BY MOUTH DAILY AT BEDTIME   dextromethorphan-guaiFENesin (ROBITUSSIN DM)  mg/5 mL syrup   No No   Sig: Take 5 mL by mouth 3 (three) times a day as needed for cough   dutasteride (AVODART) 0.5 mg capsule  Self Yes No   Sig: Take 1 capsule by mouth daily   fluticasone (FLOVENT HFA) 110 MCG/ACT inhaler   Yes No   Sig: Inhale 2 puffs 2 (two) times a day Rinse mouth after use.   Patient not taking: Reported on 11/9/2022    fluticasone (VERAMYST) 27.5 MCG/SPRAY nasal spray   Yes No   Si sprays into each nostril daily   Patient not taking: Reported on 2022   furosemide (LASIX) 20 mg tablet   Yes No   Sig: Take 2 tablets by mouth   losartan (COZAAR) 50 mg tablet   Yes No   Sig: Take 50 mg by mouth   metFORMIN (GLUCOPHAGE) 500 mg tablet  Self Yes No   Sig: Take 1,000 mg by mouth 2 (two) times a day with meals    metoprolol succinate (TOPROL-XL) 50 mg 24 hr tablet   Yes No   Sig: Take 75 mg by mouth daily   rosuvastatin (CRESTOR) 10 MG tablet   Yes No   Sig: Take 10 mg by mouth daily   Patient not taking: Reported on 2022   sildenafil (REVATIO) 20 mg tablet   Yes No   Sig: Take 20 mg by mouth   Patient not taking: Reported on 2022   spironolactone (ALDACTONE) 25 mg tablet   Yes No   Sig: Take 25 mg by mouth daily   tadalafil (CIALIS) 20 MG tablet   Yes No   Sig: Take 20 mg by mouth   Patient not taking: Reported on 2022      Facility-Administered Medications: None       Past Medical History:   Diagnosis Date    BPH (benign prostatic hyperplasia)     Coronary artery disease 2021    Diabetes mellitus (HCC)     ICD (implantable cardioverter-defibrillator) in place     Obesity        Past Surgical History:   Procedure Laterality Date    CHOLECYSTECTOMY         History reviewed. No pertinent family history.  I have reviewed and agree with the history as documented.    E-Cigarette/Vaping    E-Cigarette Use Never User      E-Cigarette/Vaping Substances    Nicotine No     THC No     CBD No     Flavoring No     Other No     Unknown No      Social History     Tobacco Use    Smoking status: Never    Smokeless tobacco: Never   Vaping Use    Vaping status: Never Used   Substance Use Topics    Alcohol use: No    Drug use: No       Review of Systems   Constitutional:  Negative for chills and fever.   HENT:  Negative for ear pain and sore throat.    Eyes:  Negative for pain and visual disturbance.   Respiratory:  Negative for  cough and shortness of breath.    Cardiovascular:  Negative for chest pain and palpitations.   Gastrointestinal:  Negative for abdominal pain and vomiting.   Genitourinary:  Positive for dysuria and hematuria.        Right groin pain   Musculoskeletal:  Negative for arthralgias and back pain.   Skin:  Negative for color change and rash.   Neurological:  Negative for seizures and syncope.   All other systems reviewed and are negative.      Physical Exam  Physical Exam  Vitals and nursing note reviewed.   Constitutional:       General: He is not in acute distress.     Appearance: He is well-developed.   HENT:      Head: Normocephalic and atraumatic.   Eyes:      Conjunctiva/sclera: Conjunctivae normal.   Cardiovascular:      Rate and Rhythm: Normal rate and regular rhythm.      Heart sounds: No murmur heard.  Pulmonary:      Effort: Pulmonary effort is normal. No respiratory distress.      Breath sounds: Normal breath sounds.   Abdominal:      Palpations: Abdomen is soft.      Tenderness: There is no abdominal tenderness.   Genitourinary:     Comments: Right inguinal hernia present with no overlying skin changes though difficulty in reducing  Musculoskeletal:         General: No swelling.      Cervical back: Neck supple.   Skin:     General: Skin is warm and dry.      Capillary Refill: Capillary refill takes less than 2 seconds.   Neurological:      Mental Status: He is alert.   Psychiatric:         Mood and Affect: Mood normal.         Vital Signs  ED Triage Vitals [04/18/24 1434]   Temperature Pulse Respirations Blood Pressure SpO2   97.9 °F (36.6 °C) 87 18 129/93 97 %      Temp Source Heart Rate Source Patient Position - Orthostatic VS BP Location FiO2 (%)   Oral -- -- -- --      Pain Score       8           Vitals:    04/18/24 1434   BP: 129/93   Pulse: 87         Visual Acuity      ED Medications  Medications - No data to display    Diagnostic Studies  Results Reviewed       Procedure Component Value Units  Date/Time    Comprehensive metabolic panel [[id number]] Collected: 04/18/24 1530    Lab Status: In process Specimen: Blood from Arm, Right Updated: 04/18/24 1542    Lactic acid, plasma (w/reflex if result > 2.0) [[id number]] Collected: 04/18/24 1530    Lab Status: In process Specimen: Blood from Arm, Right Updated: 04/18/24 1542    Protime-INR [[id number]] Collected: 04/18/24 1530    Lab Status: In process Specimen: Blood from Arm, Right Updated: 04/18/24 1542    APTT [[id number]] Collected: 04/18/24 1530    Lab Status: In process Specimen: Blood from Arm, Right Updated: 04/18/24 1542    CBC and differential [[id number]]  (Abnormal) Collected: 04/18/24 1530    Lab Status: Final result Specimen: Blood from Arm, Right Updated: 04/18/24 1541     WBC 5.51 Thousand/uL      RBC 5.60 Million/uL      Hemoglobin 16.6 g/dL      Hematocrit 50.4 %      MCV 90 fL      MCH 29.6 pg      MCHC 32.9 g/dL      RDW 13.4 %      MPV 8.9 fL      Platelets 250 Thousands/uL      nRBC 0 /100 WBCs      Segmented % 54 %      Immature Grans % 1 %      Lymphocytes % 31 %      Monocytes % 10 %      Eosinophils Relative 3 %      Basophils Relative 1 %      Absolute Neutrophils 2.99 Thousands/µL      Absolute Immature Grans 0.04 Thousand/uL      Absolute Lymphocytes 1.71 Thousands/µL      Absolute Monocytes 0.56 Thousand/µL      Eosinophils Absolute 0.15 Thousand/µL      Basophils Absolute 0.06 Thousands/µL     UA w Reflex to Microscopic w Reflex to Culture [[id number]] Collected: 04/18/24 1520    Lab Status: In process Specimen: Urine, Clean Catch Updated: 04/18/24 1527                   CT abdomen pelvis with contrast    (Results Pending)              Procedures  Procedures         ED Course         [facility] approximately 1545 pending further work up and reevaluation.  Stable at the time of signout                                    Medical Decision Making  63-year-old male presents to the ED for evaluation of  hematuria, right groin pain.  Differential includes but not limited to hemorrhagic cystitis, pyelonephritis, incarcerated inguinal hernia, strangulated inguinal hernia.  Will obtain labs, urine, CT abdomen pelvis to better evaluate.  Please see ED course for remainder of MDM    Amount and/or Complexity of Data Reviewed  Labs: ordered.  Radiology: ordered.             Disposition  Final diagnoses:   None     ED Disposition       None          Follow-up Information    None         Patient's Medications   Discharge Prescriptions    No medications on file       No discharge procedures on file.    PDMP Review       None            ED Provider  Electronically Signed by             Marquez Foley DO  04/18/24 8230

## 2024-04-18 NOTE — ED CARE HANDOFF
Emergency Department Sign Out Note        Sign out and transfer of care from Dr Foley. See Separate Emergency Department note.     The patient, Dexter Reyes, was evaluated by the previous provider for abd pain and hematuria.    Workup Completed:  Labs    ED Course / Workup Pending (followup):  CT a/p and labs, urine pending.                                  ED Course as of 04/25/24 1429   Thu Apr 18, 2024   1544 Received patient in sign out, pending labs, urine, CT abd/pelvis for abdominal pain, hematuria.     Procedures  Medical Decision Making  Amount and/or Complexity of Data Reviewed  Labs: ordered.  Radiology: ordered.    Risk  Prescription drug management.            Disposition  Final diagnoses:   Right inguinal hernia   Hematuria   Bladder diverticulum   BPH (benign prostatic hyperplasia)     Time reflects when diagnosis was documented in both MDM as applicable and the Disposition within this note       Time User Action Codes Description Comment    4/18/2024  3:45 PM Marquez Foley Add [K40.90] Right inguinal hernia     4/18/2024  6:35 PM Dichter, Reese Add [R31.9] Hematuria     4/18/2024  6:36 PM Dichter, Reese Add [N32.3] Bladder diverticulum     4/18/2024  6:36 PM Dichter, Reese Add [N40.0] BPH (benign prostatic hyperplasia)     4/18/2024  6:36 PM Dichter, Reese Modify [K40.90] Right inguinal hernia     4/18/2024  6:36 PM Dichter, Reese Modify [R31.9] Hematuria           ED Disposition       ED Disposition   Discharge    Condition   Stable    Date/Time   u Apr 18, 2024  6:35 PM    Comment   Dexter Reyes discharge to home/self care.                   Follow-up Information    None       Discharge Medication List as of 4/18/2024  6:37 PM        CONTINUE these medications which have NOT CHANGED    Details   alfuzosin (UROXATRAL) 10 mg 24 hr tablet Take 10 mg by mouth daily, Historical Med      AMLODIPINE BESYLATE PO Historical Med      apixaban (ELIQUIS) 5 mg Take 5 mg by mouth 2 (two) times a day,  Historical Med      aspirin (ECOTRIN LOW STRENGTH) 81 mg EC tablet Take 81 mg by mouth, Historical Med      cephalexin (KEFLEX) 500 mg capsule Take 1 capsule (500 mg total) by mouth every 12 (twelve) hours for 5 days, Starting Sat 4/13/2024, Until Thu 4/18/2024, Normal      cetirizine (ZyrTEC) 10 mg tablet Take 1 tablet by mouth, Historical Med      cyclobenzaprine (FLEXERIL) 10 mg tablet TAKE 1 TABLET BY MOUTH DAILY AT BEDTIME, Normal      dextromethorphan-guaiFENesin (ROBITUSSIN DM)  mg/5 mL syrup Take 5 mL by mouth 3 (three) times a day as needed for cough, Starting Wed 11/30/2022, Normal      dutasteride (AVODART) 0.5 mg capsule Take 1 capsule by mouth daily, Starting Tue 6/24/2014, Historical Med      Empagliflozin 25 MG TABS Take 25 mg by mouth, Historical Med      fluticasone (FLOVENT HFA) 110 MCG/ACT inhaler Inhale 2 puffs 2 (two) times a day Rinse mouth after use., Historical Med      fluticasone (VERAMYST) 27.5 MCG/SPRAY nasal spray 2 sprays into each nostril daily, Historical Med      furosemide (LASIX) 20 mg tablet Take 2 tablets by mouth, Historical Med      losartan (COZAAR) 50 mg tablet Take 50 mg by mouth, Historical Med      metFORMIN (GLUCOPHAGE) 500 mg tablet Take 1,000 mg by mouth 2 (two) times a day with meals , Historical Med      metoprolol succinate (TOPROL-XL) 50 mg 24 hr tablet Take 75 mg by mouth daily, Historical Med      rosuvastatin (CRESTOR) 10 MG tablet Take 10 mg by mouth daily, Historical Med      sildenafil (REVATIO) 20 mg tablet Take 20 mg by mouth, Starting Thu 2/28/2019, Historical Med      spironolactone (ALDACTONE) 25 mg tablet Take 25 mg by mouth daily, Historical Med      tadalafil (CIALIS) 20 MG tablet Take 20 mg by mouth, Starting Fri 5/19/2017, Historical Med           No discharge procedures on file.       ED Provider  Electronically Signed by     Reese Hansen,   04/25/24 7271

## 2024-09-07 ENCOUNTER — HOSPITAL ENCOUNTER (EMERGENCY)
Facility: HOSPITAL | Age: 64
Discharge: LEFT BEFORE DISCHARGE | End: 2024-09-07
Attending: EMERGENCY MEDICINE | Admitting: EMERGENCY MEDICINE
Payer: COMMERCIAL

## 2024-09-07 ENCOUNTER — APPOINTMENT (EMERGENCY)
Dept: CT IMAGING | Facility: HOSPITAL | Age: 64
End: 2024-09-07
Payer: COMMERCIAL

## 2024-09-07 ENCOUNTER — APPOINTMENT (EMERGENCY)
Dept: RADIOLOGY | Facility: HOSPITAL | Age: 64
End: 2024-09-07
Payer: COMMERCIAL

## 2024-09-07 VITALS
SYSTOLIC BLOOD PRESSURE: 105 MMHG | DIASTOLIC BLOOD PRESSURE: 69 MMHG | RESPIRATION RATE: 18 BRPM | OXYGEN SATURATION: 95 % | HEART RATE: 82 BPM | TEMPERATURE: 97.9 F

## 2024-09-07 DIAGNOSIS — E87.1 HYPONATREMIA: ICD-10-CM

## 2024-09-07 DIAGNOSIS — K35.80 ACUTE APPENDICITIS: Primary | ICD-10-CM

## 2024-09-07 LAB
ALBUMIN SERPL BCG-MCNC: 3.9 G/DL (ref 3.5–5)
ALP SERPL-CCNC: 57 U/L (ref 34–104)
ALT SERPL W P-5'-P-CCNC: 21 U/L (ref 7–52)
ANION GAP SERPL CALCULATED.3IONS-SCNC: 9 MMOL/L (ref 4–13)
APTT PPP: 34 SECONDS (ref 23–34)
AST SERPL W P-5'-P-CCNC: 19 U/L (ref 13–39)
BACTERIA UR QL AUTO: ABNORMAL /HPF
BASOPHILS # BLD AUTO: 0.05 THOUSANDS/ÂΜL (ref 0–0.1)
BASOPHILS NFR BLD AUTO: 1 % (ref 0–1)
BILIRUB SERPL-MCNC: 0.82 MG/DL (ref 0.2–1)
BILIRUB UR QL STRIP: NEGATIVE
BUN SERPL-MCNC: 21 MG/DL (ref 5–25)
CALCIUM SERPL-MCNC: 8.5 MG/DL (ref 8.4–10.2)
CARDIAC TROPONIN I PNL SERPL HS: 3 NG/L
CHLORIDE SERPL-SCNC: 98 MMOL/L (ref 96–108)
CLARITY UR: CLEAR
CO2 SERPL-SCNC: 22 MMOL/L (ref 21–32)
COLOR UR: ABNORMAL
CREAT SERPL-MCNC: 1.23 MG/DL (ref 0.6–1.3)
EOSINOPHIL # BLD AUTO: 0.18 THOUSAND/ÂΜL (ref 0–0.61)
EOSINOPHIL NFR BLD AUTO: 2 % (ref 0–6)
ERYTHROCYTE [DISTWIDTH] IN BLOOD BY AUTOMATED COUNT: 13 % (ref 11.6–15.1)
GFR SERPL CREATININE-BSD FRML MDRD: 62 ML/MIN/1.73SQ M
GLUCOSE SERPL-MCNC: 113 MG/DL (ref 65–140)
GLUCOSE UR STRIP-MCNC: ABNORMAL MG/DL
HCT VFR BLD AUTO: 44.3 % (ref 36.5–49.3)
HGB BLD-MCNC: 15.3 G/DL (ref 12–17)
HGB UR QL STRIP.AUTO: ABNORMAL
HYALINE CASTS #/AREA URNS LPF: ABNORMAL /LPF
IMM GRANULOCYTES # BLD AUTO: 0.03 THOUSAND/UL (ref 0–0.2)
IMM GRANULOCYTES NFR BLD AUTO: 0 % (ref 0–2)
INR PPP: 1.13 (ref 0.85–1.19)
KETONES UR STRIP-MCNC: NEGATIVE MG/DL
LACTATE SERPL-SCNC: 1.2 MMOL/L (ref 0.5–2)
LEUKOCYTE ESTERASE UR QL STRIP: ABNORMAL
LIPASE SERPL-CCNC: 32 U/L (ref 11–82)
LYMPHOCYTES # BLD AUTO: 1.22 THOUSANDS/ÂΜL (ref 0.6–4.47)
LYMPHOCYTES NFR BLD AUTO: 16 % (ref 14–44)
MCH RBC QN AUTO: 30.5 PG (ref 26.8–34.3)
MCHC RBC AUTO-ENTMCNC: 34.5 G/DL (ref 31.4–37.4)
MCV RBC AUTO: 88 FL (ref 82–98)
MONOCYTES # BLD AUTO: 0.88 THOUSAND/ÂΜL (ref 0.17–1.22)
MONOCYTES NFR BLD AUTO: 12 % (ref 4–12)
NEUTROPHILS # BLD AUTO: 5.14 THOUSANDS/ÂΜL (ref 1.85–7.62)
NEUTS SEG NFR BLD AUTO: 69 % (ref 43–75)
NITRITE UR QL STRIP: NEGATIVE
NON-SQ EPI CELLS URNS QL MICRO: ABNORMAL /HPF
NRBC BLD AUTO-RTO: 0 /100 WBCS
PH UR STRIP.AUTO: 5.5 [PH]
PLATELET # BLD AUTO: 201 THOUSANDS/UL (ref 149–390)
PMV BLD AUTO: 8.8 FL (ref 8.9–12.7)
POTASSIUM SERPL-SCNC: 3.5 MMOL/L (ref 3.5–5.3)
PROT SERPL-MCNC: 6.5 G/DL (ref 6.4–8.4)
PROT UR STRIP-MCNC: NEGATIVE MG/DL
PROTHROMBIN TIME: 15.3 SECONDS (ref 12.3–15)
RBC # BLD AUTO: 5.01 MILLION/UL (ref 3.88–5.62)
RBC #/AREA URNS AUTO: ABNORMAL /HPF
SODIUM SERPL-SCNC: 129 MMOL/L (ref 135–147)
SP GR UR STRIP.AUTO: 1.01 (ref 1–1.03)
UROBILINOGEN UR STRIP-ACNC: <2 MG/DL
WBC # BLD AUTO: 7.5 THOUSAND/UL (ref 4.31–10.16)
WBC #/AREA URNS AUTO: ABNORMAL /HPF

## 2024-09-07 PROCEDURE — 96361 HYDRATE IV INFUSION ADD-ON: CPT

## 2024-09-07 PROCEDURE — NC001 PR NO CHARGE: Performed by: SURGERY

## 2024-09-07 PROCEDURE — 81001 URINALYSIS AUTO W/SCOPE: CPT | Performed by: EMERGENCY MEDICINE

## 2024-09-07 PROCEDURE — 87040 BLOOD CULTURE FOR BACTERIA: CPT | Performed by: EMERGENCY MEDICINE

## 2024-09-07 PROCEDURE — 83690 ASSAY OF LIPASE: CPT | Performed by: EMERGENCY MEDICINE

## 2024-09-07 PROCEDURE — 99285 EMERGENCY DEPT VISIT HI MDM: CPT | Performed by: EMERGENCY MEDICINE

## 2024-09-07 PROCEDURE — 85610 PROTHROMBIN TIME: CPT | Performed by: EMERGENCY MEDICINE

## 2024-09-07 PROCEDURE — 83605 ASSAY OF LACTIC ACID: CPT | Performed by: EMERGENCY MEDICINE

## 2024-09-07 PROCEDURE — 36415 COLL VENOUS BLD VENIPUNCTURE: CPT | Performed by: EMERGENCY MEDICINE

## 2024-09-07 PROCEDURE — 87086 URINE CULTURE/COLONY COUNT: CPT | Performed by: EMERGENCY MEDICINE

## 2024-09-07 PROCEDURE — 84484 ASSAY OF TROPONIN QUANT: CPT | Performed by: EMERGENCY MEDICINE

## 2024-09-07 PROCEDURE — 80053 COMPREHEN METABOLIC PANEL: CPT

## 2024-09-07 PROCEDURE — 85730 THROMBOPLASTIN TIME PARTIAL: CPT | Performed by: EMERGENCY MEDICINE

## 2024-09-07 PROCEDURE — 71045 X-RAY EXAM CHEST 1 VIEW: CPT

## 2024-09-07 PROCEDURE — 96365 THER/PROPH/DIAG IV INF INIT: CPT

## 2024-09-07 PROCEDURE — 96375 TX/PRO/DX INJ NEW DRUG ADDON: CPT

## 2024-09-07 PROCEDURE — 85025 COMPLETE CBC W/AUTO DIFF WBC: CPT

## 2024-09-07 PROCEDURE — 99285 EMERGENCY DEPT VISIT HI MDM: CPT

## 2024-09-07 PROCEDURE — 74177 CT ABD & PELVIS W/CONTRAST: CPT

## 2024-09-07 RX ORDER — SODIUM CHLORIDE 9 MG/ML
125 INJECTION, SOLUTION INTRAVENOUS CONTINUOUS
Status: DISCONTINUED | OUTPATIENT
Start: 2024-09-07 | End: 2024-09-07 | Stop reason: HOSPADM

## 2024-09-07 RX ORDER — ACETAMINOPHEN 325 MG/1
975 TABLET ORAL ONCE
Status: COMPLETED | OUTPATIENT
Start: 2024-09-07 | End: 2024-09-07

## 2024-09-07 RX ORDER — MORPHINE SULFATE 4 MG/ML
4 INJECTION, SOLUTION INTRAMUSCULAR; INTRAVENOUS ONCE
Status: COMPLETED | OUTPATIENT
Start: 2024-09-07 | End: 2024-09-07

## 2024-09-07 RX ORDER — MORPHINE SULFATE 4 MG/ML
4 INJECTION, SOLUTION INTRAMUSCULAR; INTRAVENOUS ONCE
Status: DISCONTINUED | OUTPATIENT
Start: 2024-09-07 | End: 2024-09-07

## 2024-09-07 RX ORDER — METRONIDAZOLE 500 MG/1
500 TABLET ORAL ONCE
Status: COMPLETED | OUTPATIENT
Start: 2024-09-07 | End: 2024-09-07

## 2024-09-07 RX ADMIN — IOHEXOL 100 ML: 350 INJECTION, SOLUTION INTRAVENOUS at 03:50

## 2024-09-07 RX ADMIN — ACETAMINOPHEN 975 MG: 325 TABLET ORAL at 06:38

## 2024-09-07 RX ADMIN — SODIUM CHLORIDE 500 ML: 0.9 INJECTION, SOLUTION INTRAVENOUS at 03:39

## 2024-09-07 RX ADMIN — METRONIDAZOLE 500 MG: 500 TABLET ORAL at 04:28

## 2024-09-07 RX ADMIN — SODIUM CHLORIDE 125 ML/HR: 0.9 INJECTION, SOLUTION INTRAVENOUS at 04:30

## 2024-09-07 RX ADMIN — MORPHINE SULFATE 4 MG: 4 INJECTION INTRAVENOUS at 03:10

## 2024-09-07 RX ADMIN — DEXTROSE 1000 MG: 50 INJECTION, SOLUTION INTRAVENOUS at 04:30

## 2024-09-07 NOTE — ED ATTENDING ATTESTATION
9/7/2024  I, Irwin Molina MD, saw and evaluated the patient. I have discussed the patient with the resident/non-physician practitioner and agree with the resident's/non-physician practitioner's findings, Plan of Care, and MDM as documented in the resident's/non-physician practitioner's note, except where noted. All available labs and Radiology studies were reviewed.  I was present for key portions of any procedure(s) performed by the resident/non-physician practitioner and I was immediately available to provide assistance.       At this point I agree with the current assessment done in the Emergency Department.  I have conducted an independent evaluation of this patient a history and physical is as follows: Patient is a 64 year old male with worsening RLQ pain since this past Wednesday and was last seen at [facility] ED on 9/6/24 for abdominal pain and was diagnosed with appendicitis on CT and patient is on eliquis so they did not recommend surgery and patient was given augmentin. Last dose was yesterday afternoon since he left his Rx in [facility]. No long travel. (+) R flank pain which is pleuritic as well. No N/V/D. No fever. Has h/o hematuria.  Has had prior ccy and hernia repair. [facility] website checked on this patient and last Rx filled was on 10/5/23 for oxycodone for 1 day supply. NCAT. No scleral icterus. Moist mucous membranes. Lungs clear. Tachycardia without murmur. Abdomen with RLQ tenderness. No guarding or rebound. Good bowel sounds. No R CVAT. No edema. No rash. No jaundice. DDx including but not limited to: appendicitis, gastroenteritis, gastritis, PUD, GERD, gastroparesis, hepatitis, pancreatitis, colitis, enteritis, diverticulitis, food poisoning, epiploic appendagitis, mesenteric adenitis, mesenteric panniculitis, mesenteric ischemia, IBD, IBS, ileus, bowel obstruction, volvulus, internal hernia, cholecystitis, biliary colic, choledocholithiasis, perforated viscus, tumor, splenic etiology,  constipation, AAA; doubt testicular torsion; renal colic, pyelonephritis, UTI; doubt cardiac etiology. Doubt PE since patient is on eliquis. Intrathoracic etiology. Will check EKG, labs, CXR and  CT.  Will give IV abx. Will consult surgery after CT result.     ED Course         Critical Care Time  Procedures

## 2024-09-07 NOTE — CONSULTS
Consultation - General Surgery   Dexter Reyes 64 y.o. male MRN: [id number]  Unit/Bed#: ED-06 Encounter: [id number]    Assessment & Plan     Assessment:  64-year-old male with acute appendicitis.  Patient previously evaluated in [facility] and determined to proceed with nonoperative management with p.o. antibiotics (Augmentin) with interval appendectomy with his preferred surgeon.  Patient presents today due to forgetting antibiotics in [place] and having abdominal pain.    Plan:  Discussed with patient that we could proceed with operative intervention at this time given persistent acute appendicitis, however do not suspect he would have significant improvement in inflammation given short course of antibiotics thus far (2 days).  Additionally, patient states he does not want to proceed with operative intervention at this hospital as he has outpatient follow-up with his known surgeon in 3 weeks.  Recommend 10-day course of Augmentin with plan to follow-up with patient's surgeon in 3 weeks for discussion of operative intervention.  Dispo per emergency department.  Discussion the patient was requiring more pain medication and having severe pain, he should present to the emergency department for further evaluation.      History of Present Illness   HPI:  Dexter Reyes is a 64 y.o. male who presents with right flank/abdominal pain x 1 day.  Patient was seen on Wednesday for acute appendicitis in [place] and determined to have nonoperative management secondary to being on Eliquis.  He was discharged home with Augmentin.  Patient forgot antibiotics in [place] and presents today due to forgetting and having worsening pain.  Patient reports pain is primarily in his right flank but is not having significant abdominal pain otherwise he denies nausea or vomiting.  After being discharged from the hospital [place], he went to work today but after working for a period of time developed worsening pain.  Denies fever,  chills.    Consults    Review of Systems  Review of systems negative unless otherwise stated in HPI    Historical Information   Past Medical History:   Diagnosis Date    BPH (benign prostatic hyperplasia)     Coronary artery disease 9/1/2021    Diabetes mellitus (HCC)     ICD (implantable cardioverter-defibrillator) in place     Obesity      Past Surgical History:   Procedure Laterality Date    CHOLECYSTECTOMY       Social History   Social History     Substance and Sexual Activity   Alcohol Use No     Social History     Substance and Sexual Activity   Drug Use No     E-Cigarette/Vaping    E-Cigarette Use Never User      E-Cigarette/Vaping Substances    Nicotine No     THC No     CBD No     Flavoring No     Other No     Unknown No      Social History     Tobacco Use   Smoking Status Never   Smokeless Tobacco Never     Family History: History reviewed. No pertinent family history.    Meds/Allergies   all current active meds have been reviewed, current meds:   Current Facility-Administered Medications   Medication Dose Route Frequency    sodium chloride 0.9 % infusion  125 mL/hr Intravenous Continuous   , and PTA meds:   Prior to Admission Medications   Prescriptions Last Dose Informant Patient Reported? Taking?   AMLODIPINE BESYLATE PO   Yes No   Empagliflozin 25 MG TABS  Self Yes No   Sig: Take 25 mg by mouth   alfuzosin (UROXATRAL) 10 mg 24 hr tablet  Self Yes No   Sig: Take 10 mg by mouth daily   apixaban (ELIQUIS) 5 mg   Yes No   Sig: Take 5 mg by mouth 2 (two) times a day   aspirin (ECOTRIN LOW STRENGTH) 81 mg EC tablet   Yes No   Sig: Take 81 mg by mouth   Patient not taking: Reported on 4/28/2023   cetirizine (ZyrTEC) 10 mg tablet   Yes No   Sig: Take 1 tablet by mouth   cyclobenzaprine (FLEXERIL) 10 mg tablet   No No   Sig: TAKE 1 TABLET BY MOUTH DAILY AT BEDTIME   dextromethorphan-guaiFENesin (ROBITUSSIN DM)  mg/5 mL syrup   No No   Sig: Take 5 mL by mouth 3 (three) times a day as needed for cough    dutasteride (AVODART) 0.5 mg capsule  Self Yes No   Sig: Take 1 capsule by mouth daily   fluticasone (FLOVENT HFA) 110 MCG/ACT inhaler   Yes No   Sig: Inhale 2 puffs 2 (two) times a day Rinse mouth after use.   Patient not taking: Reported on 2022   fluticasone (VERAMYST) 27.5 MCG/SPRAY nasal spray   Yes No   Si sprays into each nostril daily   Patient not taking: Reported on 2022   furosemide (LASIX) 20 mg tablet   Yes No   Sig: Take 2 tablets by mouth   losartan (COZAAR) 50 mg tablet   Yes No   Sig: Take 50 mg by mouth   metFORMIN (GLUCOPHAGE) 500 mg tablet  Self Yes No   Sig: Take 1,000 mg by mouth 2 (two) times a day with meals    metoprolol succinate (TOPROL-XL) 50 mg 24 hr tablet   Yes No   Sig: Take 75 mg by mouth daily   rosuvastatin (CRESTOR) 10 MG tablet   Yes No   Sig: Take 10 mg by mouth daily   Patient not taking: Reported on 2022   sildenafil (REVATIO) 20 mg tablet   Yes No   Sig: Take 20 mg by mouth   Patient not taking: Reported on 2022   spironolactone (ALDACTONE) 25 mg tablet   Yes No   Sig: Take 25 mg by mouth daily   tadalafil (CIALIS) 20 MG tablet   Yes No   Sig: Take 20 mg by mouth   Patient not taking: Reported on 2022      Facility-Administered Medications: None     Allergies   Allergen Reactions    Liraglutide Shortness Of Breath    Pollen Extract Shortness Of Breath       Objective   First Vitals:   Blood Pressure: 119/79 (24)  Pulse: 103 (24)  Temperature: 97.9 °F (36.6 °C) (24)  Temp Source: Oral (24)  Respirations: 20 (24)  SpO2: 96 % (24)    Current Vitals:   Blood Pressure: 105/69 (24)  Pulse: 82 (24)  Temperature: 97.9 °F (36.6 °C) (24)  Temp Source: Oral (09/07/24 0220)  Respirations: 18 (24 044)  SpO2: 95 % (24)      Intake/Output Summary (Last 24 hours) at 2024 0657  Last data filed at 2024 0639  Gross per 24 hour   Intake  818.75 ml   Output --   Net 818.75 ml       Invasive Devices       None                   Physical Exam  Vitals and nursing note reviewed.   Constitutional:       General: He is not in acute distress.     Appearance: Normal appearance. He is not ill-appearing.   HENT:      Head: Normocephalic and atraumatic.      Mouth/Throat:      Mouth: Mucous membranes are moist.      Pharynx: Oropharynx is clear.   Eyes:      Extraocular Movements: Extraocular movements intact.   Cardiovascular:      Rate and Rhythm: Normal rate and regular rhythm.   Pulmonary:      Effort: Pulmonary effort is normal. No respiratory distress.   Abdominal:      General: Abdomen is flat. There is no distension.      Palpations: Abdomen is soft.      Tenderness: There is no abdominal tenderness. There is no guarding.   Musculoskeletal:         General: Normal range of motion.      Cervical back: Neck supple.   Skin:     General: Skin is warm and dry.   Neurological:      General: No focal deficit present.      Mental Status: He is alert and oriented to person, place, and time. Mental status is at baseline.   Psychiatric:         Mood and Affect: Mood normal.         Thought Content: Thought content normal.         Judgment: Judgment normal.         Lab Results: CBC:   Lab Results   Component Value Date    WBC 7.50 09/07/2024    HGB 15.3 09/07/2024    HCT 44.3 09/07/2024    MCV 88 09/07/2024     09/07/2024    RBC 5.01 09/07/2024    MCH 30.5 09/07/2024    MCHC 34.5 09/07/2024    RDW 13.0 09/07/2024    MPV 8.8 (L) 09/07/2024    NRBC 0 09/07/2024   , CMP:   Lab Results   Component Value Date    SODIUM 129 (L) 09/07/2024    K 3.5 09/07/2024    CL 98 09/07/2024    CO2 22 09/07/2024    BUN 21 09/07/2024    CREATININE 1.23 09/07/2024    CALCIUM 8.5 09/07/2024    AST 19 09/07/2024    ALT 21 09/07/2024    ALKPHOS 57 09/07/2024    EGFR 62 09/07/2024     Imaging: I have personally reviewed pertinent reports.    EKG, Pathology, and Other Studies: I have  personally reviewed pertinent reports.

## 2024-09-07 NOTE — ED NOTES
Pt rings call bell for flank pain. Dr Garcia made aware.      April Acuna, MARGARET  09/07/24 0647

## 2024-09-07 NOTE — ED PROVIDER NOTES
History  Chief Complaint   Patient presents with    Flank Pain     Pt reports yesterday he began having abd pain; today pain is now in R flank. Pt reports he went to hospital yesterday, was told it was appendix but because he takes eliquis he cannot have surgery. Pt reports pain is increasing; 3000mg tylenol, 1200mg ibuprofen today. Pt was given abx but RX was sent to pharmacy in [place].      HPI    63-year-old male presents for evaluation of right flank pain.  He has a history of cardiomyopathy, LVEF 24%, and AICD. He was recently seen in ER in [place] for abdominal pain, and was diagnosed with acute appendicitis.  However, he would not be able to have surgery and is on Eliquis.  He was discharged with Augmentin.  He returns today complaining of right flank pain, which has not improved with Tylenol or ibuprofen.  Pain started earlier today.  Also reports leaving his Augmentin prescription and near where he works.  He denies nausea, vomiting, diarrhea, fevers, chills.    Prior to Admission Medications   Prescriptions Last Dose Informant Patient Reported? Taking?   AMLODIPINE BESYLATE PO   Yes No   Empagliflozin 25 MG TABS  Self Yes No   Sig: Take 25 mg by mouth   alfuzosin (UROXATRAL) 10 mg 24 hr tablet  Self Yes No   Sig: Take 10 mg by mouth daily   apixaban (ELIQUIS) 5 mg   Yes No   Sig: Take 5 mg by mouth 2 (two) times a day   aspirin (ECOTRIN LOW STRENGTH) 81 mg EC tablet   Yes No   Sig: Take 81 mg by mouth   Patient not taking: Reported on 4/28/2023   cetirizine (ZyrTEC) 10 mg tablet   Yes No   Sig: Take 1 tablet by mouth   cyclobenzaprine (FLEXERIL) 10 mg tablet   No No   Sig: TAKE 1 TABLET BY MOUTH DAILY AT BEDTIME   dextromethorphan-guaiFENesin (ROBITUSSIN DM)  mg/5 mL syrup   No No   Sig: Take 5 mL by mouth 3 (three) times a day as needed for cough   dutasteride (AVODART) 0.5 mg capsule  Self Yes No   Sig: Take 1 capsule by mouth daily   fluticasone (FLOVENT HFA) 110 MCG/ACT inhaler   Yes No   Sig:  Inhale 2 puffs 2 (two) times a day Rinse mouth after use.   Patient not taking: Reported on 2022   fluticasone (VERAMYST) 27.5 MCG/SPRAY nasal spray   Yes No   Si sprays into each nostril daily   Patient not taking: Reported on 2022   furosemide (LASIX) 20 mg tablet   Yes No   Sig: Take 2 tablets by mouth   losartan (COZAAR) 50 mg tablet   Yes No   Sig: Take 50 mg by mouth   metFORMIN (GLUCOPHAGE) 500 mg tablet  Self Yes No   Sig: Take 1,000 mg by mouth 2 (two) times a day with meals    metoprolol succinate (TOPROL-XL) 50 mg 24 hr tablet   Yes No   Sig: Take 75 mg by mouth daily   rosuvastatin (CRESTOR) 10 MG tablet   Yes No   Sig: Take 10 mg by mouth daily   Patient not taking: Reported on 2022   sildenafil (REVATIO) 20 mg tablet   Yes No   Sig: Take 20 mg by mouth   Patient not taking: Reported on 2022   spironolactone (ALDACTONE) 25 mg tablet   Yes No   Sig: Take 25 mg by mouth daily   tadalafil (CIALIS) 20 MG tablet   Yes No   Sig: Take 20 mg by mouth   Patient not taking: Reported on 2022      Facility-Administered Medications: None       Past Medical History:   Diagnosis Date    BPH (benign prostatic hyperplasia)     Coronary artery disease 2021    Diabetes mellitus (HCC)     ICD (implantable cardioverter-defibrillator) in place     Obesity        Past Surgical History:   Procedure Laterality Date    CHOLECYSTECTOMY         History reviewed. No pertinent family history.  I have reviewed and agree with the history as documented.    E-Cigarette/Vaping    E-Cigarette Use Never User      E-Cigarette/Vaping Substances    Nicotine No     THC No     CBD No     Flavoring No     Other No     Unknown No      Social History     Tobacco Use    Smoking status: Never    Smokeless tobacco: Never   Vaping Use    Vaping status: Never Used   Substance Use Topics    Alcohol use: No    Drug use: No        Review of Systems   Constitutional:  Negative for chills and fever.   Respiratory:  Negative  for shortness of breath.    Cardiovascular:  Negative for chest pain and palpitations.   Gastrointestinal:  Positive for abdominal pain. Negative for abdominal distention, nausea and vomiting.   Genitourinary:  Positive for flank pain. Negative for dysuria, frequency and hematuria.   Neurological:  Negative for dizziness and headaches.   All other systems reviewed and are negative.      Physical Exam  ED Triage Vitals [09/07/24 0220]   Temperature Pulse Respirations Blood Pressure SpO2   97.9 °F (36.6 °C) 103 20 119/79 96 %      Temp Source Heart Rate Source Patient Position - Orthostatic VS BP Location FiO2 (%)   Oral Monitor Sitting Left arm --      Pain Score       --             Orthostatic Vital Signs  Vitals:    09/07/24 0220 09/07/24 0433 09/07/24 0445   BP: 119/79 105/69 105/69   Pulse: 103 91 82   Patient Position - Orthostatic VS: Sitting         Physical Exam  Vitals and nursing note reviewed.   Constitutional:       General: He is not in acute distress.     Appearance: He is well-developed.   HENT:      Head: Normocephalic and atraumatic.   Eyes:      Conjunctiva/sclera: Conjunctivae normal.      Pupils: Pupils are equal, round, and reactive to light.   Cardiovascular:      Rate and Rhythm: Normal rate and regular rhythm.      Heart sounds: No murmur heard.  Pulmonary:      Effort: Pulmonary effort is normal. No respiratory distress.      Breath sounds: Normal breath sounds.   Abdominal:      Palpations: Abdomen is soft.      Tenderness: There is generalized abdominal tenderness and tenderness in the right lower quadrant. There is no guarding. Positive signs include McBurney's sign.      Hernia: No hernia is present.   Musculoskeletal:         General: No swelling.      Cervical back: Neck supple.   Skin:     General: Skin is warm and dry.      Capillary Refill: Capillary refill takes less than 2 seconds.   Neurological:      General: No focal deficit present.      Mental Status: He is alert and oriented  to person, place, and time.   Psychiatric:         Mood and Affect: Mood normal.         ED Medications  Medications   morphine injection 4 mg (4 mg Intravenous Given 9/7/24 0310)   sodium chloride 0.9 % bolus 500 mL (0 mL Intravenous Stopped 9/7/24 0439)   iohexol (OMNIPAQUE) 350 MG/ML injection (MULTI-DOSE) 100 mL (100 mL Intravenous Given 9/7/24 0350)   metroNIDAZOLE (FLAGYL) tablet 500 mg (500 mg Oral Given 9/7/24 0428)   ceftriaxone (ROCEPHIN) 1 g/50 mL in dextrose IVPB (0 mg Intravenous Stopped 9/7/24 0500)   acetaminophen (TYLENOL) tablet 975 mg (975 mg Oral Given 9/7/24 0638)       Diagnostic Studies  Results Reviewed       Procedure Component Value Units Date/Time    Blood culture #1 [[id number]] Collected: 09/07/24 0332    Lab Status: Preliminary result Specimen: Blood from Arm, Left Updated: 09/07/24 0901     Blood Culture Received in Microbiology Lab. Culture in Progress.    Blood culture #2 [[id number]] Collected: 09/07/24 0332    Lab Status: Preliminary result Specimen: Blood from Arm, Right Updated: 09/07/24 0901     Blood Culture Received in Microbiology Lab. Culture in Progress.    Urine culture [[id number]] Collected: 09/07/24 0428    Lab Status: In process Specimen: Urine, Clean Catch Updated: 09/07/24 0432    Urine Microscopic [[id number]]  (Abnormal) Collected: 09/07/24 0337    Lab Status: Final result Specimen: Urine, Clean Catch Updated: 09/07/24 0410     RBC, UA 20-30 /hpf      WBC, UA 2-4 /hpf      Epithelial Cells None Seen /hpf      Bacteria, UA Occasional /hpf      Hyaline Casts, UA 0-3 /lpf     Lactic acid, plasma (w/reflex if result > 2.0) [[id number]]  (Normal) Collected: 09/07/24 0332    Lab Status: Final result Specimen: Blood from Arm, Left Updated: 09/07/24 0410     LACTIC ACID 1.2 mmol/L     Narrative:      Result may be elevated if tourniquet was used during collection.    HS Troponin 0hr (reflex protocol) [[id number]]  (Normal) Collected: 09/07/24 0332    Lab Status: Final result  Specimen: Blood from Arm, Left Updated: 09/07/24 0408     hs TnI 0hr 3 ng/L     Protime-INR [[id number]]  (Abnormal) Collected: 09/07/24 0332    Lab Status: Final result Specimen: Blood from Arm, Left Updated: 09/07/24 0400     Protime 15.3 seconds      INR 1.13    Narrative:      INR Therapeutic Range    Indication                                             INR Range      Atrial Fibrillation                                               2.0-3.0  Hypercoagulable State                                    2.0.2.3  Left Ventricular Asist Device                            2.0-3.0  Mechanical Heart Valve                                  -    Aortic(with afib, MI, embolism, HF, LA enlargement,    and/or coagulopathy)                                     2.0-3.0 (2.5-3.5)     Mitral                                                             2.5-3.5  Prosthetic/Bioprosthetic Heart Valve               2.0-3.0  Venous thromboembolism (VTE: VT, PE        2.0-3.0    APTT [[id number]]  (Normal) Collected: 09/07/24 0332    Lab Status: Final result Specimen: Blood from Arm, Left Updated: 09/07/24 0400     PTT 34 seconds     UA w Reflex to Microscopic w Reflex to Culture [[id number]]  (Abnormal) Collected: 09/07/24 0337    Lab Status: Final result Specimen: Urine, Clean Catch Updated: 09/07/24 0357     Color, UA Light Yellow     Clarity, UA Clear     Specific Gravity, UA 1.011     pH, UA 5.5     Leukocytes, UA Elevated glucose may cause decreased leukocyte values. See urine microscopic for UWBC result     Nitrite, UA Negative     Protein, UA Negative mg/dl      Glucose, UA >=1000 (1%) mg/dl      Ketones, UA Negative mg/dl      Urobilinogen, UA <2.0 mg/dl      Bilirubin, UA Negative     Occult Blood, UA Large    Comprehensive metabolic panel [[id number]]  (Abnormal) Collected: 09/07/24 0315    Lab Status: Final result Specimen: Blood from Arm, Left Updated: 09/07/24 0334     Sodium 129 mmol/L      Potassium 3.5 mmol/L      Chloride 98  mmol/L      CO2 22 mmol/L      ANION GAP 9 mmol/L      BUN 21 mg/dL      Creatinine 1.23 mg/dL      Glucose 113 mg/dL      Calcium 8.5 mg/dL      AST 19 U/L      ALT 21 U/L      Alkaline Phosphatase 57 U/L      Total Protein 6.5 g/dL      Albumin 3.9 g/dL      Total Bilirubin 0.82 mg/dL      eGFR 62 ml/min/1.73sq m     Narrative:      National Kid[employer] Foundation guidelines for Chronic Kidney Disease (CKD):     Stage 1 with normal or high GFR (GFR > 90 mL/min/1.73 square meters)    Stage 2 Mild CKD (GFR = 60-89 mL/min/1.73 square meters)    Stage 3A Moderate CKD (GFR = 45-59 mL/min/1.73 square meters)    Stage 3B Moderate CKD (GFR = 30-44 mL/min/1.73 square meters)    Stage 4 Severe CKD (GFR = 15-29 mL/min/1.73 square meters)    Stage 5 End Stage CKD (GFR <15 mL/min/1.73 square meters)  Note: GFR calculation is accurate only with a steady state creatinine    Lipase [[id number]]  (Normal) Collected: 09/07/24 0315    Lab Status: Final result Specimen: Blood from Arm, Left Updated: 09/07/24 0334     Lipase 32 u/L     CBC and differential [[id number]]  (Abnormal) Collected: 09/07/24 0315    Lab Status: Final result Specimen: Blood from Arm, Left Updated: 09/07/24 0323     WBC 7.50 Thousand/uL      RBC 5.01 Million/uL      Hemoglobin 15.3 g/dL      Hematocrit 44.3 %      MCV 88 fL      MCH 30.5 pg      MCHC 34.5 g/dL      RDW 13.0 %      MPV 8.8 fL      Platelets 201 Thousands/uL      nRBC 0 /100 WBCs      Segmented % 69 %      Immature Grans % 0 %      Lymphocytes % 16 %      Monocytes % 12 %      Eosinophils Relative 2 %      Basophils Relative 1 %      Absolute Neutrophils 5.14 Thousands/µL      Absolute Immature Grans 0.03 Thousand/uL      Absolute Lymphocytes 1.22 Thousands/µL      Absolute Monocytes 0.88 Thousand/µL      Eosinophils Absolute 0.18 Thousand/µL      Basophils Absolute 0.05 Thousands/µL                    CT abdomen pelvis with contrast   Final Result by Jose Jones DO (09/07 0410)      Acute  appendicitis as previously noted on outside CT from 9/5/2024. No abscess or free air identified.      Partially imaged thick-walled fluid collection within the right hemiscrotum. This may represent a complex hydrocele or chronic hematoma/seroma in the setting of right inguinal hernia repair. Underlying mass or abscess not excluded. This appears to have    been present on study of 4/18/2024 correlate with scrotal ultrasound or dedicated pelvic CT with contrast when clinically warranted      The study was marked in EPIC for immediate notification.      Workstation performed: [email or web address]         XR chest 1 view portable   Final Result by Susan Mendiola MD (09/07 0851)      No acute cardiopulmonary disease.            Workstation performed: [id number]               Procedures  Procedures      ED Course                                       Medical Decision Making  64-year-old male presents for evaluation of abdominal pain.    Initial evaluation, patient in no acute distress, resting comfortably in bed.  Right lower quadrant tenderness noted, over McBurney's point.  No CVA tenderness.  VSS.  Labs, CT A/P and CXR ordered.  Labs significant for sodium of 129, but otherwise unremarkable.  Patient given IV morphine for pain management.  CT showed acute appendicitis with no evidence of abscess or free air.  Metronidazole and Rocephin given.  Surgery resident was consulted for evaluation of patient, but on her evaluation patient stated that he  does not want to have surgery in this hospital, and would rather have surgery with his surgeon who he trusts.  Explained to him that if he was discharged at this time, he would be leaving AGAINST MEDICAL ADVICE.  Discussed the risks of that which include death, disability, perforation, severe infection.  He states full understanding of the risks, but would still like discharged and is declining the surgery.  Patient was prescribed 10-day course of Augmentin, and was referred to  general surgery for further evaluation.  Patient was then discharged AGAINST MEDICAL ADVICE.    Amount and/or Complexity of Data Reviewed  Labs: ordered.  Radiology: ordered.    Risk  OTC drugs.  Prescription drug management.          Disposition  Final diagnoses:   Acute appendicitis   Hyponatremia     Time reflects when diagnosis was documented in both MDM as applicable and the Disposition within this note       Time User Action Codes Description Comment    9/7/2024  6:38 AM Ze Garcia Add [K35.80] Acute appendicitis     9/7/2024  6:38 AM Ze Garcia Add [E87.1] Hyponatremia           ED Disposition       ED Disposition   AMA    Condition   --    Date/Time   Sat Sep 7, 2024  6:37 AM    Comment   Date: 9/7/2024  Patient: Dexter Reyes  Admitted: 9/7/2024  2:13 AM  Attending Provider: Irwin Molina MD    Dexter Reyes or his authorized caregiver has made the decision for the patient to leave the emergency department against the adv ice of his attending physician. He or his authorized caregiver has been informed and understands the inherent risks, including death, infection, disability, perforation of appendix.  He or his authorized caregiver has decided to accept the responsibi lity for this decision. Dexter Reyes and all necessary parties have been advised that he may return for further evaluation or treatment. His condition at time of discharge was stable.  Dexter Reyes had current vital signs as follows:  / 69   Pulse 82   Temp 97.9 °F (36.6 °C) (Oral)   Resp 18                Follow-up Information       Follow up With Specialties Details Why Contact Info    Judd Clarke, DO General Surgery Go in 2 days For discussion about appendectomy [address] [place] [address]  [address] [place] [id number]  [phone]              Discharge Medication List as of 9/7/2024  6:49 AM        START taking these medications    Details   amoxicillin-clavulanate (AUGMENTIN) [phone]  mg per tablet Take 1 tablet by mouth every 12 (twelve) hours for 10 days, Starting Sat 9/7/2024, Until Tue 9/17/2024, Normal           CONTINUE these medications which have NOT CHANGED    Details   alfuzosin (UROXATRAL) 10 mg 24 hr tablet Take 10 mg by mouth daily, Historical Med      AMLODIPINE BESYLATE PO Historical Med      apixaban (ELIQUIS) 5 mg Take 5 mg by mouth 2 (two) times a day, Historical Med      aspirin (ECOTRIN LOW STRENGTH) 81 mg EC tablet Take 81 mg by mouth, Historical Med      cetirizine (ZyrTEC) 10 mg tablet Take 1 tablet by mouth, Historical Med      cyclobenzaprine (FLEXERIL) 10 mg tablet TAKE 1 TABLET BY MOUTH DAILY AT BEDTIME, Normal      dextromethorphan-guaiFENesin (ROBITUSSIN DM)  mg/5 mL syrup Take 5 mL by mouth 3 (three) times a day as needed for cough, Starting Wed 11/30/2022, Normal      dutasteride (AVODART) 0.5 mg capsule Take 1 capsule by mouth daily, Starting Tue 6/24/2014, Historical Med      Empagliflozin 25 MG TABS Take 25 mg by mouth, Historical Med      fluticasone (FLOVENT HFA) 110 MCG/ACT inhaler Inhale 2 puffs 2 (two) times a day Rinse mouth after use., Historical Med      fluticasone (VERAMYST) 27.5 MCG/SPRAY nasal spray 2 sprays into each nostril daily, Historical Med      furosemide (LASIX) 20 mg tablet Take 2 tablets by mouth, Historical Med      losartan (COZAAR) 50 mg tablet Take 50 mg by mouth, Historical Med      metFORMIN (GLUCOPHAGE) 500 mg tablet Take 1,000 mg by mouth 2 (two) times a day with meals , Historical Med      metoprolol succinate (TOPROL-XL) 50 mg 24 hr tablet Take 75 mg by mouth daily, Historical Med      rosuvastatin (CRESTOR) 10 MG tablet Take 10 mg by mouth daily, Historical Med      sildenafil (REVATIO) 20 mg tablet Take 20 mg by mouth, Starting Thu 2/28/2019, Historical Med      spironolactone (ALDACTONE) 25 mg tablet Take 25 mg by mouth daily, Historical Med      tadalafil (CIALIS) 20 MG tablet Take 20 mg by mouth, Starting Fri 5/19/2017,  Historical Med           No discharge procedures on file.    PDMP Review         Value Time User    PDMP Reviewed  Yes 9/7/2024  2:16 AM Irwin Molina MD             ED Provider  Attending physically available and evaluated Dexter Reyes. I managed the patient along with the ED Attending.    Electronically Signed by           Ze Garcia MD  09/07/24 [id number]       Ze Garcia MD  09/10/24 0015

## 2024-09-08 ENCOUNTER — HOSPITAL ENCOUNTER (OUTPATIENT)
Facility: HOSPITAL | Age: 64
Setting detail: OBSERVATION
Discharge: HOME | End: 2024-09-09
Attending: EMERGENCY MEDICINE | Admitting: SURGERY
Payer: COMMERCIAL

## 2024-09-08 DIAGNOSIS — I48.20 CHRONIC ATRIAL FIBRILLATION (HCC): ICD-10-CM

## 2024-09-08 DIAGNOSIS — M54.41 CHRONIC RIGHT-SIDED LOW BACK PAIN WITH RIGHT-SIDED SCIATICA: ICD-10-CM

## 2024-09-08 DIAGNOSIS — K35.80 ACUTE APPENDICITIS: Primary | ICD-10-CM

## 2024-09-08 DIAGNOSIS — I50.9 HEART FAILURE, UNSPECIFIED HF CHRONICITY, UNSPECIFIED HEART FAILURE TYPE (HCC): ICD-10-CM

## 2024-09-08 DIAGNOSIS — G89.29 CHRONIC RIGHT-SIDED LOW BACK PAIN WITH RIGHT-SIDED SCIATICA: ICD-10-CM

## 2024-09-08 LAB
ALBUMIN SERPL BCG-MCNC: 3.9 G/DL (ref 3.5–5)
ALP SERPL-CCNC: 66 U/L (ref 34–104)
ALT SERPL W P-5'-P-CCNC: 18 U/L (ref 7–52)
ANION GAP SERPL CALCULATED.3IONS-SCNC: 8 MMOL/L (ref 4–13)
APTT PPP: 32 SECONDS (ref 23–34)
AST SERPL W P-5'-P-CCNC: 17 U/L (ref 13–39)
ATRIAL RATE: 80 BPM
BACTERIA UR CULT: NORMAL
BACTERIA UR QL AUTO: ABNORMAL /HPF
BASOPHILS # BLD AUTO: 0.07 THOUSANDS/ÂΜL (ref 0–0.1)
BASOPHILS NFR BLD AUTO: 1 % (ref 0–1)
BILIRUB SERPL-MCNC: 0.31 MG/DL (ref 0.2–1)
BILIRUB UR QL STRIP: NEGATIVE
BUN SERPL-MCNC: 19 MG/DL (ref 5–25)
CALCIUM SERPL-MCNC: 8.6 MG/DL (ref 8.4–10.2)
CHLORIDE SERPL-SCNC: 101 MMOL/L (ref 96–108)
CLARITY UR: CLEAR
CO2 SERPL-SCNC: 23 MMOL/L (ref 21–32)
COLOR UR: COLORLESS
CREAT SERPL-MCNC: 0.97 MG/DL (ref 0.6–1.3)
EOSINOPHIL # BLD AUTO: 0.29 THOUSAND/ÂΜL (ref 0–0.61)
EOSINOPHIL NFR BLD AUTO: 5 % (ref 0–6)
ERYTHROCYTE [DISTWIDTH] IN BLOOD BY AUTOMATED COUNT: 12.9 % (ref 11.6–15.1)
EST. AVERAGE GLUCOSE BLD GHB EST-MCNC: 128 MG/DL
GFR SERPL CREATININE-BSD FRML MDRD: 82 ML/MIN/1.73SQ M
GLUCOSE SERPL-MCNC: 123 MG/DL (ref 65–140)
GLUCOSE SERPL-MCNC: 139 MG/DL (ref 65–140)
GLUCOSE SERPL-MCNC: 153 MG/DL (ref 65–140)
GLUCOSE SERPL-MCNC: 91 MG/DL (ref 65–140)
GLUCOSE UR STRIP-MCNC: ABNORMAL MG/DL
HBA1C MFR BLD: 6.1 %
HCT VFR BLD AUTO: 43.6 % (ref 36.5–49.3)
HGB BLD-MCNC: 14.9 G/DL (ref 12–17)
HGB UR QL STRIP.AUTO: ABNORMAL
IMM GRANULOCYTES # BLD AUTO: 0.03 THOUSAND/UL (ref 0–0.2)
IMM GRANULOCYTES NFR BLD AUTO: 1 % (ref 0–2)
INR PPP: 0.95 (ref 0.85–1.19)
KETONES UR STRIP-MCNC: NEGATIVE MG/DL
LEUKOCYTE ESTERASE UR QL STRIP: ABNORMAL
LIPASE SERPL-CCNC: 43 U/L (ref 11–82)
LYMPHOCYTES # BLD AUTO: 1.37 THOUSANDS/ÂΜL (ref 0.6–4.47)
LYMPHOCYTES NFR BLD AUTO: 25 % (ref 14–44)
MCH RBC QN AUTO: 30.9 PG (ref 26.8–34.3)
MCHC RBC AUTO-ENTMCNC: 34.2 G/DL (ref 31.4–37.4)
MCV RBC AUTO: 91 FL (ref 82–98)
MONOCYTES # BLD AUTO: 0.66 THOUSAND/ÂΜL (ref 0.17–1.22)
MONOCYTES NFR BLD AUTO: 12 % (ref 4–12)
MUCOUS THREADS UR QL AUTO: ABNORMAL
NEUTROPHILS # BLD AUTO: 3.15 THOUSANDS/ÂΜL (ref 1.85–7.62)
NEUTS SEG NFR BLD AUTO: 56 % (ref 43–75)
NITRITE UR QL STRIP: NEGATIVE
NON-SQ EPI CELLS URNS QL MICRO: ABNORMAL /HPF
NRBC BLD AUTO-RTO: 0 /100 WBCS
P AXIS: 65 DEGREES
PH UR STRIP.AUTO: 6 [PH]
PLATELET # BLD AUTO: 199 THOUSANDS/UL (ref 149–390)
PMV BLD AUTO: 9.2 FL (ref 8.9–12.7)
POTASSIUM SERPL-SCNC: 4 MMOL/L (ref 3.5–5.3)
PR INTERVAL: 198 MS
PROT SERPL-MCNC: 6.4 G/DL (ref 6.4–8.4)
PROT UR STRIP-MCNC: NEGATIVE MG/DL
PROTHROMBIN TIME: 13.4 SECONDS (ref 12.3–15)
QRS AXIS: 1 DEGREES
QRSD INTERVAL: 82 MS
QT INTERVAL: 362 MS
QTC INTERVAL: 417 MS
RBC # BLD AUTO: 4.82 MILLION/UL (ref 3.88–5.62)
RBC #/AREA URNS AUTO: ABNORMAL /HPF
SODIUM SERPL-SCNC: 132 MMOL/L (ref 135–147)
SP GR UR STRIP.AUTO: 1.01 (ref 1–1.03)
T WAVE AXIS: 19 DEGREES
UROBILINOGEN UR STRIP-ACNC: <2 MG/DL
VENTRICULAR RATE: 80 BPM
WBC # BLD AUTO: 5.57 THOUSAND/UL (ref 4.31–10.16)
WBC #/AREA URNS AUTO: ABNORMAL /HPF

## 2024-09-08 PROCEDURE — 81001 URINALYSIS AUTO W/SCOPE: CPT | Performed by: EMERGENCY MEDICINE

## 2024-09-08 PROCEDURE — 82948 REAGENT STRIP/BLOOD GLUCOSE: CPT

## 2024-09-08 PROCEDURE — 93005 ELECTROCARDIOGRAM TRACING: CPT

## 2024-09-08 PROCEDURE — 85025 COMPLETE CBC W/AUTO DIFF WBC: CPT | Performed by: EMERGENCY MEDICINE

## 2024-09-08 PROCEDURE — 87040 BLOOD CULTURE FOR BACTERIA: CPT | Performed by: EMERGENCY MEDICINE

## 2024-09-08 PROCEDURE — 99223 1ST HOSP IP/OBS HIGH 75: CPT | Performed by: SURGERY

## 2024-09-08 PROCEDURE — 96375 TX/PRO/DX INJ NEW DRUG ADDON: CPT

## 2024-09-08 PROCEDURE — 99284 EMERGENCY DEPT VISIT MOD MDM: CPT

## 2024-09-08 PROCEDURE — 96367 TX/PROPH/DG ADDL SEQ IV INF: CPT

## 2024-09-08 PROCEDURE — 36415 COLL VENOUS BLD VENIPUNCTURE: CPT | Performed by: EMERGENCY MEDICINE

## 2024-09-08 PROCEDURE — 85610 PROTHROMBIN TIME: CPT | Performed by: EMERGENCY MEDICINE

## 2024-09-08 PROCEDURE — 85730 THROMBOPLASTIN TIME PARTIAL: CPT | Performed by: EMERGENCY MEDICINE

## 2024-09-08 PROCEDURE — 80053 COMPREHEN METABOLIC PANEL: CPT | Performed by: EMERGENCY MEDICINE

## 2024-09-08 PROCEDURE — 99204 OFFICE O/P NEW MOD 45 MIN: CPT | Performed by: INTERNAL MEDICINE

## 2024-09-08 PROCEDURE — 83036 HEMOGLOBIN GLYCOSYLATED A1C: CPT

## 2024-09-08 PROCEDURE — 83690 ASSAY OF LIPASE: CPT | Performed by: EMERGENCY MEDICINE

## 2024-09-08 PROCEDURE — NC001 PR NO CHARGE: Performed by: INTERNAL MEDICINE

## 2024-09-08 PROCEDURE — NC001 PR NO CHARGE: Performed by: SURGERY

## 2024-09-08 PROCEDURE — 99285 EMERGENCY DEPT VISIT HI MDM: CPT | Performed by: EMERGENCY MEDICINE

## 2024-09-08 PROCEDURE — 93010 ELECTROCARDIOGRAM REPORT: CPT | Performed by: INTERNAL MEDICINE

## 2024-09-08 PROCEDURE — 96365 THER/PROPH/DIAG IV INF INIT: CPT

## 2024-09-08 RX ORDER — ACETAMINOPHEN 325 MG/1
975 TABLET ORAL EVERY 6 HOURS
Status: DISCONTINUED | OUTPATIENT
Start: 2024-09-08 | End: 2024-09-09 | Stop reason: HOSPADM

## 2024-09-08 RX ORDER — INSULIN LISPRO 100 [IU]/ML
1-6 INJECTION, SOLUTION INTRAVENOUS; SUBCUTANEOUS
Status: DISCONTINUED | OUTPATIENT
Start: 2024-09-08 | End: 2024-09-09 | Stop reason: HOSPADM

## 2024-09-08 RX ORDER — METRONIDAZOLE 500 MG/100ML
500 INJECTION, SOLUTION INTRAVENOUS EVERY 8 HOURS
Status: DISCONTINUED | OUTPATIENT
Start: 2024-09-08 | End: 2024-09-09 | Stop reason: HOSPADM

## 2024-09-08 RX ORDER — HYDROMORPHONE HCL/PF 1 MG/ML
0.5 SYRINGE (ML) INJECTION ONCE AS NEEDED
Status: COMPLETED | OUTPATIENT
Start: 2024-09-08 | End: 2024-09-08

## 2024-09-08 RX ORDER — ACETAMINOPHEN 325 MG/1
975 TABLET ORAL ONCE
Status: DISCONTINUED | OUTPATIENT
Start: 2024-09-08 | End: 2024-09-08

## 2024-09-08 RX ORDER — FENTANYL CITRATE 50 UG/ML
50 INJECTION, SOLUTION INTRAMUSCULAR; INTRAVENOUS ONCE
Status: COMPLETED | OUTPATIENT
Start: 2024-09-08 | End: 2024-09-08

## 2024-09-08 RX ORDER — SODIUM CHLORIDE, SODIUM GLUCONATE, SODIUM ACETATE, POTASSIUM CHLORIDE, MAGNESIUM CHLORIDE, SODIUM PHOSPHATE, DIBASIC, AND POTASSIUM PHOSPHATE .53; .5; .37; .037; .03; .012; .00082 G/100ML; G/100ML; G/100ML; G/100ML; G/100ML; G/100ML; G/100ML
50 INJECTION, SOLUTION INTRAVENOUS CONTINUOUS
Status: DISCONTINUED | OUTPATIENT
Start: 2024-09-08 | End: 2024-09-08

## 2024-09-08 RX ORDER — SODIUM CHLORIDE, SODIUM GLUCONATE, SODIUM ACETATE, POTASSIUM CHLORIDE, MAGNESIUM CHLORIDE, SODIUM PHOSPHATE, DIBASIC, AND POTASSIUM PHOSPHATE .53; .5; .37; .037; .03; .012; .00082 G/100ML; G/100ML; G/100ML; G/100ML; G/100ML; G/100ML; G/100ML
100 INJECTION, SOLUTION INTRAVENOUS CONTINUOUS
Status: DISCONTINUED | OUTPATIENT
Start: 2024-09-08 | End: 2024-09-08

## 2024-09-08 RX ORDER — METHOCARBAMOL 500 MG/1
500 TABLET, FILM COATED ORAL EVERY 6 HOURS SCHEDULED
Status: DISCONTINUED | OUTPATIENT
Start: 2024-09-08 | End: 2024-09-09 | Stop reason: HOSPADM

## 2024-09-08 RX ORDER — HYDROMORPHONE HCL IN WATER/PF 6 MG/30 ML
0.2 PATIENT CONTROLLED ANALGESIA SYRINGE INTRAVENOUS EVERY 4 HOURS PRN
Status: DISCONTINUED | OUTPATIENT
Start: 2024-09-08 | End: 2024-09-09 | Stop reason: HOSPADM

## 2024-09-08 RX ORDER — HYDROMORPHONE HCL/PF 1 MG/ML
0.5 SYRINGE (ML) INJECTION ONCE
Status: COMPLETED | OUTPATIENT
Start: 2024-09-08 | End: 2024-09-08

## 2024-09-08 RX ORDER — ENOXAPARIN SODIUM 100 MG/ML
40 INJECTION SUBCUTANEOUS DAILY
Status: DISCONTINUED | OUTPATIENT
Start: 2024-09-08 | End: 2024-09-09 | Stop reason: HOSPADM

## 2024-09-08 RX ORDER — OXYCODONE HYDROCHLORIDE 5 MG/1
5 TABLET ORAL EVERY 4 HOURS PRN
Status: DISCONTINUED | OUTPATIENT
Start: 2024-09-08 | End: 2024-09-09 | Stop reason: HOSPADM

## 2024-09-08 RX ORDER — FINASTERIDE 5 MG/1
5 TABLET, FILM COATED ORAL DAILY
Status: DISCONTINUED | OUTPATIENT
Start: 2024-09-08 | End: 2024-09-09 | Stop reason: HOSPADM

## 2024-09-08 RX ADMIN — METHOCARBAMOL TABLETS 500 MG: 500 TABLET, COATED ORAL at 17:13

## 2024-09-08 RX ADMIN — HYDROMORPHONE HYDROCHLORIDE 0.5 MG: 1 INJECTION, SOLUTION INTRAMUSCULAR; INTRAVENOUS; SUBCUTANEOUS at 08:10

## 2024-09-08 RX ADMIN — OXYCODONE HYDROCHLORIDE 5 MG: 5 TABLET ORAL at 20:32

## 2024-09-08 RX ADMIN — METRONIDAZOLE 500 MG: 500 INJECTION, SOLUTION INTRAVENOUS at 08:22

## 2024-09-08 RX ADMIN — DEXTROSE 1000 MG: 50 INJECTION, SOLUTION INTRAVENOUS at 08:00

## 2024-09-08 RX ADMIN — FINASTERIDE 5 MG: 5 TABLET, FILM COATED ORAL at 11:55

## 2024-09-08 RX ADMIN — SODIUM CHLORIDE, SODIUM GLUCONATE, SODIUM ACETATE, POTASSIUM CHLORIDE, MAGNESIUM CHLORIDE, SODIUM PHOSPHATE, DIBASIC, AND POTASSIUM PHOSPHATE 50 ML/HR: .53; .5; .37; .037; .03; .012; .00082 INJECTION, SOLUTION INTRAVENOUS at 09:53

## 2024-09-08 RX ADMIN — ACETAMINOPHEN 975 MG: 325 TABLET ORAL at 15:44

## 2024-09-08 RX ADMIN — FENTANYL CITRATE 50 MCG: 50 INJECTION INTRAMUSCULAR; INTRAVENOUS at 06:34

## 2024-09-08 RX ADMIN — ACETAMINOPHEN 975 MG: 325 TABLET ORAL at 20:32

## 2024-09-08 RX ADMIN — OXYCODONE HYDROCHLORIDE 5 MG: 5 TABLET ORAL at 15:51

## 2024-09-08 RX ADMIN — OXYCODONE HYDROCHLORIDE 5 MG: 5 TABLET ORAL at 11:05

## 2024-09-08 RX ADMIN — METHOCARBAMOL TABLETS 500 MG: 500 TABLET, COATED ORAL at 11:05

## 2024-09-08 RX ADMIN — ENOXAPARIN SODIUM 40 MG: 40 INJECTION SUBCUTANEOUS at 09:20

## 2024-09-08 RX ADMIN — METRONIDAZOLE 500 MG: 500 INJECTION, SOLUTION INTRAVENOUS at 15:45

## 2024-09-08 RX ADMIN — HYDROMORPHONE HYDROCHLORIDE 0.5 MG: 1 INJECTION, SOLUTION INTRAMUSCULAR; INTRAVENOUS; SUBCUTANEOUS at 09:19

## 2024-09-08 RX ADMIN — HYDROMORPHONE HYDROCHLORIDE 0.2 MG: 0.2 INJECTION, SOLUTION INTRAMUSCULAR; INTRAVENOUS; SUBCUTANEOUS at 18:35

## 2024-09-08 RX ADMIN — ACETAMINOPHEN 975 MG: 325 TABLET ORAL at 09:51

## 2024-09-08 RX ADMIN — METOPROLOL SUCCINATE 75 MG: 50 TABLET, EXTENDED RELEASE ORAL at 11:55

## 2024-09-08 NOTE — CONSULTS
Consultation - General Surgery   Dexter Reyes 64 y.o. male MRN: [id number]  Unit/Bed#: ED-03 Encounter: [id number]    Assessment & Plan     Assessment:  64 y.o. male with acute appendicitis.  Patient previously evaluated in [facility] and determined to proceed with nonoperative management with p.o. antibiotics (Augmentin) with interval appendectomy with his preferred surgeon.  Patient presented yesterday morning due to forgetting antibiotics in [place] and having abdominal pain, he was discharged on 10 days of Augmentin with plan for follow-up with surgeon in 3 weeks.  Patient presents today with worsening abdominal pain and now wishes to proceed with surgery.     Plan:  - Recommend admission to general surgery service  - NPO for possible surgery  - Will discuss surgical options  - Isolyte 50  - IV rocephin/flagyl  - Pain and nausea control  - DVT ppx  - Cardiology consultation      History of Present Illness     64 y.o. male with PMH ICD, afib on Eliquis, CAD, DM, prior lap steven and ventral and inguinal hernia repairs who re-presents with acute appendicitis.  Patient was previously evaluated in [facility] and determined to proceed with nonoperative management with p.o. antibiotics (Augmentin) with interval appendectomy with his preferred surgeon.  Patient presented yesterday morning to [facility] ED due to forgetting antibiotics in [place] and having abdominal pain, he was discharged on 10 days of Augmentin with plan for follow-up with surgeon in 3 weeks per patient preference. Patient presents today with worsening abdominal pain and now wishes to discuss surgery. CT abdomen pelvis yesterday and in [place] showing signs of acute appendicitis, no repeat imaging this AM. He denies nausea, vomiting, or changes in appetite. His pain is in the RLQ and R flank. WBC 5.5 from 7 yesterday.     Consult to surgery general  Consult performed by: So Kang MD  Consult ordered by: Mikki Blanco  MD          Review of Systems  Constitutional:  Negative for activity change.   HENT:  Negative for congestion.    Eyes:  Negative for discharge.   Respiratory:  Negative for cough.    Cardiovascular:  Negative for chest pain.   Gastrointestinal:  Positive for abdominal distention and abdominal pain.   Musculoskeletal:  Negative for arthralgias.   Skin:  Negative for color change.   Neurological:  Negative for facial asymmetry.   Psychiatric/Behavioral:  Negative for agitation.      Historical Information   Past Medical History:   Diagnosis Date    BPH (benign prostatic hyperplasia)     Coronary artery disease 9/1/2021    Diabetes mellitus (HCC)     ICD (implantable cardioverter-defibrillator) in place     Obesity      Past Surgical History:   Procedure Laterality Date    CHOLECYSTECTOMY      HERNIA REPAIR       Social History   Social History     Substance and Sexual Activity   Alcohol Use No     Social History     Substance and Sexual Activity   Drug Use No     E-Cigarette/Vaping    E-Cigarette Use Never User      E-Cigarette/Vaping Substances    Nicotine No     THC No     CBD No     Flavoring No     Other No     Unknown No      Social History     Tobacco Use   Smoking Status Never   Smokeless Tobacco Never     Family History: History reviewed. No pertinent family history.    Meds/Allergies   current meds:   Current Facility-Administered Medications   Medication Dose Route Frequency    acetaminophen (TYLENOL) tablet 975 mg  975 mg Oral Q6H    [START ON 9/9/2024] ceftriaxone (ROCEPHIN) 1 g/50 mL in dextrose IVPB  1,000 mg Intravenous Q24H    enoxaparin (LOVENOX) subcutaneous injection 40 mg  40 mg Subcutaneous Daily    methocarbamol (ROBAXIN) tablet 500 mg  500 mg Oral Q6H FABIAN    metroNIDAZOLE (FLAGYL) IVPB (premix) 500 mg 100 mL  500 mg Intravenous Q8H    multi-electrolyte (PLASMALYTE-A/ISOLYTE-S PH 7.4) IV solution  50 mL/hr Intravenous Continuous    oxyCODONE (ROXICODONE) IR tablet 5 mg  5 mg Oral Q4H PRN     oxyCODONE (ROXICODONE) split tablet 2.5 mg  2.5 mg Oral Q4H PRN    and PTA meds:   Prior to Admission Medications   Prescriptions Last Dose Informant Patient Reported? Taking?   AMLODIPINE BESYLATE PO   Yes No   Empagliflozin 25 MG TABS  Self Yes No   Sig: Take 25 mg by mouth   alfuzosin (UROXATRAL) 10 mg 24 hr tablet  Self Yes No   Sig: Take 10 mg by mouth daily   amoxicillin-clavulanate (AUGMENTIN) 875-125 mg per tablet   No No   Sig: Take 1 tablet by mouth every 12 (twelve) hours for 10 days   apixaban (ELIQUIS) 5 mg   Yes No   Sig: Take 5 mg by mouth 2 (two) times a day   aspirin (ECOTRIN LOW STRENGTH) 81 mg EC tablet   Yes No   Sig: Take 81 mg by mouth   Patient not taking: Reported on 2023   cetirizine (ZyrTEC) 10 mg tablet   Yes No   Sig: Take 1 tablet by mouth   cyclobenzaprine (FLEXERIL) 10 mg tablet   No No   Sig: TAKE 1 TABLET BY MOUTH DAILY AT BEDTIME   dextromethorphan-guaiFENesin (ROBITUSSIN DM)  mg/5 mL syrup   No No   Sig: Take 5 mL by mouth 3 (three) times a day as needed for cough   dutasteride (AVODART) 0.5 mg capsule  Self Yes No   Sig: Take 1 capsule by mouth daily   fluticasone (FLOVENT HFA) 110 MCG/ACT inhaler   Yes No   Sig: Inhale 2 puffs 2 (two) times a day Rinse mouth after use.   Patient not taking: Reported on 2022   fluticasone (VERAMYST) 27.5 MCG/SPRAY nasal spray   Yes No   Si sprays into each nostril daily   Patient not taking: Reported on 2022   furosemide (LASIX) 20 mg tablet   Yes No   Sig: Take 2 tablets by mouth   losartan (COZAAR) 50 mg tablet   Yes No   Sig: Take 50 mg by mouth   metFORMIN (GLUCOPHAGE) 500 mg tablet  Self Yes No   Sig: Take 1,000 mg by mouth 2 (two) times a day with meals    metoprolol succinate (TOPROL-XL) 50 mg 24 hr tablet   Yes No   Sig: Take 75 mg by mouth daily   rosuvastatin (CRESTOR) 10 MG tablet   Yes No   Sig: Take 10 mg by mouth daily   Patient not taking: Reported on 2022   sildenafil (REVATIO) 20 mg tablet   Yes No    Sig: Take 20 mg by mouth   Patient not taking: Reported on 11/9/2022   spironolactone (ALDACTONE) 25 mg tablet   Yes No   Sig: Take 25 mg by mouth daily   tadalafil (CIALIS) 20 MG tablet   Yes No   Sig: Take 20 mg by mouth   Patient not taking: Reported on 11/9/2022      Facility-Administered Medications: None     Allergies   Allergen Reactions    Liraglutide Shortness Of Breath    Pollen Extract Shortness Of Breath       Objective   First Vitals:   Blood Pressure: 127/72 (09/08/24 0525)  Pulse: 75 (09/08/24 0525)  Temperature: 98.2 °F (36.8 °C) (09/08/24 0525)  Temp Source: Oral (09/08/24 0525)  Respirations: 20 (09/08/24 0525)  SpO2: 95 % (09/08/24 0525)    Current Vitals:   Blood Pressure: 124/78 (09/08/24 0635)  Pulse: 88 (09/08/24 0635)  Temperature: 98.2 °F (36.8 °C) (09/08/24 0525)  Temp Source: Oral (09/08/24 0525)  Respirations: 18 (09/08/24 0635)  SpO2: 96 % (09/08/24 0635)    No intake or output data in the 24 hours ending 09/08/24 0658    Invasive Devices       Peripheral Intravenous Line  Duration             Peripheral IV 09/08/24 Right;Ventral (anterior) Forearm <1 day                    Physical Exam  HENT:      Head: Normocephalic and atraumatic.      Right Ear: External ear normal.      Left Ear: External ear normal.      Nose: Nose normal.      Mouth/Throat:      Pharynx: Oropharynx is clear.   Eyes:      Extraocular Movements: Extraocular movements intact.   Cardiovascular:      Rate and Rhythm: Normal rate.   Pulmonary:      Effort: Pulmonary effort is normal.   Abdominal:      General: Abdomen is flat.      Palpations: Abdomen is soft.      Tenderness: There is abdominal tenderness.   Musculoskeletal:         General: Normal range of motion.      Cervical back: Normal range of motion.   Skin:     General: Skin is warm and dry.   Neurological:      Mental Status: He is alert. Mental status is at baseline.   Psychiatric:         Mood and Affect: Mood normal.         Behavior: Behavior normal.  "    Lab Results: CBC:   Lab Results   Component Value Date    WBC 5.57 09/08/2024    HGB 14.9 09/08/2024    HCT 43.6 09/08/2024    MCV 91 09/08/2024     09/08/2024    RBC 4.82 09/08/2024    MCH 30.9 09/08/2024    MCHC 34.2 09/08/2024    RDW 12.9 09/08/2024    MPV 9.2 09/08/2024    NRBC 0 09/08/2024   , CMP:   Lab Results   Component Value Date    SODIUM 132 (L) 09/08/2024    K 4.0 09/08/2024     09/08/2024    CO2 23 09/08/2024    BUN 19 09/08/2024    CREATININE 0.97 09/08/2024    CALCIUM 8.6 09/08/2024    AST 17 09/08/2024    ALT 18 09/08/2024    ALKPHOS 66 09/08/2024    EGFR 82 09/08/2024   , Coagulation:   Lab Results   Component Value Date    INR 0.95 09/08/2024   , Urinalysis:   Lab Results   Component Value Date    COLORU Colorless 09/08/2024    CLARITYU Clear 09/08/2024    SPECGRAV 1.011 09/08/2024    PHUR 6.0 09/08/2024    LEUKOCYTESUR (A) 09/08/2024     Elevated glucose may cause decreased leukocyte values. See urine microscopic for UWBC result    NITRITE Negative 09/08/2024    GLUCOSEU >=1000 (1%) (A) 09/08/2024    KETONESU Negative 09/08/2024    BILIRUBINUR Negative 09/08/2024    BLOODU Moderate (A) 09/08/2024   , Amylase: No results found for: \"AMYLASE\", Lipase:   Lab Results   Component Value Date    LIPASE 43 09/08/2024     Imaging: I have personally reviewed pertinent reports.    EKG, Pathology, and Other Studies: I have personally reviewed pertinent reports.      Counseling / Coordination of Care  Total floor / unit time spent today 30 minutes.  Greater than 50% of total time was spent with the patient and / or family counseling and / or coordination of care.      "

## 2024-09-08 NOTE — CONSULTS
Cardiology   Dexter Reyes 64 y.o. male MRN: [id number]  Unit/Bed#: S -01 Encounter: [id number]      Reason for Consult / Principal Problem:pre op clearance    Physician Requesting Consult:  Judd Clarke DO    Cardiologist: Dr. Alphonso Sánchez    PCP:No primary care provider on file.      Assessment/Plan:  Acute appendicitis general surgery following   Diabetes type 2 -hold mounjaro  Atrial fibrillation hold Eliquis and amiodarone 200 mg daily  Coronary artery disease-cath [facility] 5/26/2021 - continue aspirin 81 mg daily    Last Cath:5/21  Coronary Findings Diagnostic Dominance: Right   Left Main: The vessel was visualized by angiography. The vessel exhibits luminal irregularities. YOGI flow is 3.   Left Anterior Descending: The vessel was visualized by angiography. The vessel exhibits luminal irregularities. YOGI flow is 3.   Left Circumflex: The vessel was visualized by angiography. The vessel exhibits luminal irregularities. YOGI flow is 3.   Right Coronary Artery: The vessel was visualized by angiography. The vessel exhibits luminal irregularities. YOGI flow is 3.   Right heart cath RV pressure normal, PA pressure normal, P pulmonary wedge pressure moderately elevated    Hyperlipidemia continue Crestor 10 mg daily    [employer] ICD model #9902491 -implant date was 12/13/2021-last interrogated at [facility] 8/8/2024 -treated for VT on 8/6/2024 with ATP back to sinus sinus tachycardia  SEE  Care Everywhere    Hypertension-on losartan 50 mg daily      HPI: Dexter Reyes 64 y.o. year old male with history of atrial fibrillation on Eliquis, coronary artery disease, ICD, diabetes type 2.  Patient was hospitalized with abdominal pain at a  [facility] and was diagnosed with acute appendicitis identified on CT of the abdomen.  He was told that they had to wait 5 days secondary to his Eliquis for a surgical procedure.  Could no longer handle the pain.  They were  planning to treat him medically.  Patient signed out AMA. Patient was given Augmentin for 10 days and to follow-up with a surgeon in 3 weeks holding his Eliquis.     Patient then came to our emergency room complaining of right lower quadrant and right flank pain.  Patient has been off his Eliquis since Friday.  HIs last dose of Mounjaro was 9/1/24.    Clearance for :   Possible appendectomy-  risk benefits for the acute appendicitis  can be performed with holding Eliquis an Mounjaro 7 days preop, careful fluid management to avoid HF.   Obtain 2 DECO and EKG      Family History: History reviewed. No pertinent family history.  Historical Information   Past Medical History:   Diagnosis Date    BPH (benign prostatic hyperplasia)     Coronary artery disease 9/1/2021    Diabetes mellitus (HCC)     ICD (implantable cardioverter-defibrillator) in place     Obesity      Past Surgical History:   Procedure Laterality Date    CHOLECYSTECTOMY      HERNIA REPAIR       Social History   Social History     Substance and Sexual Activity   Alcohol Use No     Social History     Substance and Sexual Activity   Drug Use No     Social History     Tobacco Use   Smoking Status Never   Smokeless Tobacco Never     Family History: History reviewed. No pertinent family history.    Review of Systems:  Review of Systems   Constitutional: Negative.    HENT: Negative.     Eyes: Negative.    Respiratory: Negative.     Cardiovascular: Negative.    Gastrointestinal:  Positive for abdominal pain.        R flank pain   Genitourinary: Negative.    Musculoskeletal: Negative.    Skin: Negative.    Hematological: Negative.    Psychiatric/Behavioral: Negative.       Scheduled Meds:  Current Facility-Administered Medications   Medication Dose Route Frequency Provider Last Rate    acetaminophen  975 mg Oral Q6H Moon Sepulveda MD      [START ON 9/9/2024] cefTRIAXone  1,000 mg Intravenous Q24H Moon Sepulveda MD      enoxaparin  40 mg Subcutaneous Daily Moon  MD Derick      methocarbamol  500 mg Oral Q6H FABIAN Moon Sepulveda MD      metroNIDAZOLE  500 mg Intravenous Q8H Mikki Blanco  mg (24)    multi-electrolyte  50 mL/hr Intravenous Continuous Moon Sepulveda MD 50 mL/hr (24)    oxyCODONE  5 mg Oral Q4H PRN Moon Sepulveda MD      oxyCODONE  2.5 mg Oral Q4H PRN Moon Sepulveda MD       Continuous Infusions:multi-electrolyte, 50 mL/hr, Last Rate: 50 mL/hr (24)      PRN Meds:.  oxyCODONE    oxyCODONE  PTA meds:   Prior to Admission Medications   Prescriptions Last Dose Informant Patient Reported? Taking?   AMLODIPINE BESYLATE PO   Yes No   Empagliflozin 25 MG TABS  Self Yes No   Sig: Take 25 mg by mouth   alfuzosin (UROXATRAL) 10 mg 24 hr tablet  Self Yes No   Sig: Take 10 mg by mouth daily   amoxicillin-clavulanate (AUGMENTIN) 875-125 mg per tablet   No No   Sig: Take 1 tablet by mouth every 12 (twelve) hours for 10 days   apixaban (ELIQUIS) 5 mg   Yes No   Sig: Take 5 mg by mouth 2 (two) times a day   aspirin (ECOTRIN LOW STRENGTH) 81 mg EC tablet   Yes No   Sig: Take 81 mg by mouth   Patient not taking: Reported on 2023   cetirizine (ZyrTEC) 10 mg tablet   Yes No   Sig: Take 1 tablet by mouth   cyclobenzaprine (FLEXERIL) 10 mg tablet   No No   Sig: TAKE 1 TABLET BY MOUTH DAILY AT BEDTIME   dextromethorphan-guaiFENesin (ROBITUSSIN DM)  mg/5 mL syrup   No No   Sig: Take 5 mL by mouth 3 (three) times a day as needed for cough   dutasteride (AVODART) 0.5 mg capsule  Self Yes No   Sig: Take 1 capsule by mouth daily   fluticasone (FLOVENT HFA) 110 MCG/ACT inhaler   Yes No   Sig: Inhale 2 puffs 2 (two) times a day Rinse mouth after use.   Patient not taking: Reported on 2022   fluticasone (VERAMYST) 27.5 MCG/SPRAY nasal spray   Yes No   Si sprays into each nostril daily   Patient not taking: Reported on 2022   furosemide (LASIX) 20 mg tablet   Yes No   Sig: Take 2 tablets by mouth    losartan (COZAAR) 50 mg tablet   Yes No   Sig: Take 50 mg by mouth   metFORMIN (GLUCOPHAGE) 500 mg tablet  Self Yes No   Sig: Take 1,000 mg by mouth 2 (two) times a day with meals    metoprolol succinate (TOPROL-XL) 50 mg 24 hr tablet   Yes No   Sig: Take 75 mg by mouth daily   rosuvastatin (CRESTOR) 10 MG tablet   Yes No   Sig: Take 10 mg by mouth daily   Patient not taking: Reported on 11/9/2022   sildenafil (REVATIO) 20 mg tablet   Yes No   Sig: Take 20 mg by mouth   Patient not taking: Reported on 11/9/2022   spironolactone (ALDACTONE) 25 mg tablet   Yes No   Sig: Take 25 mg by mouth daily   tadalafil (CIALIS) 20 MG tablet   Yes No   Sig: Take 20 mg by mouth   Patient not taking: Reported on 11/9/2022      Facility-Administered Medications: None       Allergies   Allergen Reactions    Liraglutide Shortness Of Breath    Pollen Extract Shortness Of Breath       Objective   Vitals: Blood pressure 106/72, pulse 79, temperature 97.7 °F (36.5 °C), resp. rate 18, SpO2 95%., There is no height or weight on file to calculate BMI.,   Orthostatic Blood Pressures      Flowsheet Row Most Recent Value   Blood Pressure 106/72 filed at 09/08/2024 0949   Patient Position - Orthostatic VS Sitting filed at 09/08/2024 0915            No intake or output data in the 24 hours ending 09/08/24 1027    Invasive Devices       Peripheral Intravenous Line  Duration             Peripheral IV 09/08/24 Right;Ventral (anterior) Forearm <1 day                  Physical Exam  Vitals reviewed.   Constitutional:       Appearance: Normal appearance.   HENT:      Head: Normocephalic and atraumatic.      Mouth/Throat:      Mouth: Mucous membranes are moist.   Cardiovascular:      Rate and Rhythm: Normal rate and regular rhythm.   Pulmonary:      Effort: Pulmonary effort is normal.      Breath sounds: Normal breath sounds.   Abdominal:      Palpations: Abdomen is soft.      Tenderness: There is guarding.      Comments: RLQ and R flank area   Skin:      General: Skin is warm and dry.      Capillary Refill: Capillary refill takes 2 to 3 seconds.   Neurological:      Mental Status: He is alert and oriented to person, place, and time.   Psychiatric:         Behavior: Behavior normal.         Lab Results:   Recent Results (from the past 24 hour(s))   CBC and differential    Collection Time: 09/08/24  6:09 AM   Result Value Ref Range    WBC 5.57 4.31 - 10.16 Thousand/uL    RBC 4.82 3.88 - 5.62 Million/uL    Hemoglobin 14.9 12.0 - 17.0 g/dL    Hematocrit 43.6 36.5 - 49.3 %    MCV 91 82 - 98 fL    MCH 30.9 26.8 - 34.3 pg    MCHC 34.2 31.4 - 37.4 g/dL    RDW 12.9 11.6 - 15.1 %    MPV 9.2 8.9 - 12.7 fL    Platelets 199 149 - 390 Thousands/uL    nRBC 0 /100 WBCs    Segmented % 56 43 - 75 %    Immature Grans % 1 0 - 2 %    Lymphocytes % 25 14 - 44 %    Monocytes % 12 4 - 12 %    Eosinophils Relative 5 0 - 6 %    Basophils Relative 1 0 - 1 %    Absolute Neutrophils 3.15 1.85 - 7.62 Thousands/µL    Absolute Immature Grans 0.03 0.00 - 0.20 Thousand/uL    Absolute Lymphocytes 1.37 0.60 - 4.47 Thousands/µL    Absolute Monocytes 0.66 0.17 - 1.22 Thousand/µL    Eosinophils Absolute 0.29 0.00 - 0.61 Thousand/µL    Basophils Absolute 0.07 0.00 - 0.10 Thousands/µL   Comprehensive metabolic panel    Collection Time: 09/08/24  6:09 AM   Result Value Ref Range    Sodium 132 (L) 135 - 147 mmol/L    Potassium 4.0 3.5 - 5.3 mmol/L    Chloride 101 96 - 108 mmol/L    CO2 23 21 - 32 mmol/L    ANION GAP 8 4 - 13 mmol/L    BUN 19 5 - 25 mg/dL    Creatinine 0.97 0.60 - 1.30 mg/dL    Glucose 139 65 - 140 mg/dL    Calcium 8.6 8.4 - 10.2 mg/dL    AST 17 13 - 39 U/L    ALT 18 7 - 52 U/L    Alkaline Phosphatase 66 34 - 104 U/L    Total Protein 6.4 6.4 - 8.4 g/dL    Albumin 3.9 3.5 - 5.0 g/dL    Total Bilirubin 0.31 0.20 - 1.00 mg/dL    eGFR 82 ml/min/1.73sq m   UA (URINE) with reflex to Scope    Collection Time: 09/08/24  6:09 AM   Result Value Ref Range    Color, UA Colorless     Clarity, UA Clear      Specific Gravity, UA 1.011 1.003 - 1.030    pH, UA 6.0 4.5, 5.0, 5.5, 6.0, 6.5, 7.0, 7.5, 8.0    Leukocytes, UA (A) Negative     Elevated glucose may cause decreased leukocyte values. See urine microscopic for [facility] result    Nitrite, UA Negative Negative    Protein, UA Negative Negative mg/dl    Glucose, UA >=1000 (1%) (A) Negative mg/dl    Ketones, UA Negative Negative mg/dl    Urobilinogen, UA <2.0 <2.0 mg/dl mg/dl    Bilirubin, UA Negative Negative    Occult Blood, UA Moderate (A) Negative   APTT    Collection Time: 09/08/24  6:09 AM   Result Value Ref Range    PTT 32 23 - 34 seconds   Protime-INR    Collection Time: 09/08/24  6:09 AM   Result Value Ref Range    Protime 13.4 12.3 - 15.0 seconds    INR 0.95 0.85 - 1.19   Lipase    Collection Time: 09/08/24  6:09 AM   Result Value Ref Range    Lipase 43 11 - 82 u/L   Urine Microscopic    Collection Time: 09/08/24  6:09 AM   Result Value Ref Range    RBC, UA 4-10 (A) None Seen, 1-2 /hpf    WBC, UA None Seen None Seen, 1-2 /hpf    Epithelial Cells None Seen None Seen, Occasional /hpf    Bacteria, UA None Seen None Seen, Occasional /hpf    MUCUS THREADS Occasional (A) None Seen   ]    Imaging: I have personally reviewed pertinent films in PACS    EKG: pending    ECHO:pending    Code Status:level 1    Epic/ [facility] Everywhere records reviewed:     * Please Note: Fluency DirectDictation voice to text software may have been used in the creation of this document. **

## 2024-09-08 NOTE — ED CARE HANDOFF
Emergency Department Sign Out Note        Sign out and transfer of care from Dr. Aragon. See Separate Emergency Department note.     The patient, Dexter Reyes, was evaluated by the previous provider for increased pain with appendicitis.    Workup Completed:  Initial labs and symptomatic relief offered.  Surgical team consulted.    ED Course / Workup Pending (followup):  Disposition per surgical team                                  ED Course as of 09/08/24 0945   Sun Sep 08, 2024   0807 Patient is voiced that he is due for his oral antibiotics.  Covering with IV dose here.  Increasing analgesia.  Have ordered 1 dose of hydromorphone for now with plan to repeat in 30 minutes if still quite uncomfortable and tolerating first dose well.   0842 Dr. Kang will be entering orders for patient to come in to Dr. Clarke's service-observation status.  Dr. Clarke will be meeting with patient later this morning to help determine further care plan-potential surgery on this admission or discussion of alternate possibility.       Procedures  Medical Decision Making  Amount and/or Complexity of Data Reviewed  Labs: ordered.    Risk  OTC drugs.  Prescription drug management.  Decision regarding hospitalization.            Disposition  Final diagnoses:   Acute appendicitis   Heart failure, unspecified HF chronicity, unspecified heart failure type (HCC)   Chronic atrial fibrillation (HCC)     Time reflects when diagnosis was documented in both MDM as applicable and the Disposition within this note       Time User Action Codes Description Comment    9/8/2024  7:12 AM Elan Aragon Add [K35.80] Acute appendicitis     9/8/2024  8:44 AM Moon Sepulveda Add [I50.9] Heart failure, unspecified HF chronicity, unspecified heart failure type (HCC)     9/8/2024  8:44 AM Moon Sepulveda Add [I48.20] Chronic atrial fibrillation (HCC)           ED Disposition       ED Disposition   Admit    Condition   Stable    Date/Time   Sun Sep  8, 2024  8:44 AM    Comment   Case was discussed with Dr. Kang and the patient's admission status was agreed to be Admission Status: observation status to the service of Dr. Clarke .               Follow-up Information    None       Current Discharge Medication List        CONTINUE these medications which have NOT CHANGED    Details   alfuzosin (UROXATRAL) 10 mg 24 hr tablet Take 10 mg by mouth daily      AMLODIPINE BESYLATE PO       amoxicillin-clavulanate (AUGMENTIN) 875-125 mg per tablet Take 1 tablet by mouth every 12 (twelve) hours for 10 days  Qty: 20 tablet, Refills: 0    Associated Diagnoses: Acute appendicitis      apixaban (ELIQUIS) 5 mg Take 5 mg by mouth 2 (two) times a day      aspirin (ECOTRIN LOW STRENGTH) 81 mg EC tablet Take 81 mg by mouth      cetirizine (ZyrTEC) 10 mg tablet Take 1 tablet by mouth      cyclobenzaprine (FLEXERIL) 10 mg tablet TAKE 1 TABLET BY MOUTH DAILY AT BEDTIME  Qty: 30 tablet, Refills: 0    Associated Diagnoses: Chronic right-sided low back pain with right-sided sciatica      dextromethorphan-guaiFENesin (ROBITUSSIN DM)  mg/5 mL syrup Take 5 mL by mouth 3 (three) times a day as needed for cough  Qty: 118 mL, Refills: 0    Associated Diagnoses: Influenza A      dutasteride (AVODART) 0.5 mg capsule Take 1 capsule by mouth daily      Empagliflozin 25 MG TABS Take 25 mg by mouth      fluticasone (FLOVENT HFA) 110 MCG/ACT inhaler Inhale 2 puffs 2 (two) times a day Rinse mouth after use.      fluticasone (VERAMYST) 27.5 MCG/SPRAY nasal spray 2 sprays into each nostril daily      furosemide (LASIX) 20 mg tablet Take 2 tablets by mouth      losartan (COZAAR) 50 mg tablet Take 50 mg by mouth      metFORMIN (GLUCOPHAGE) 500 mg tablet Take 1,000 mg by mouth 2 (two) times a day with meals       metoprolol succinate (TOPROL-XL) 50 mg 24 hr tablet Take 75 mg by mouth daily      rosuvastatin (CRESTOR) 10 MG tablet Take 10 mg by mouth daily      sildenafil (REVATIO) 20 mg tablet  Take 20 mg by mouth      spironolactone (ALDACTONE) 25 mg tablet Take 25 mg by mouth daily      tadalafil (CIALIS) 20 MG tablet Take 20 mg by mouth           No discharge procedures on file.       ED Provider  Electronically Signed by     Mikki Blanco MD  09/08/24 0958

## 2024-09-08 NOTE — ED PROVIDER NOTES
History  Chief Complaint   Patient presents with    Flank Pain     Pt has known appendicitis, was going to go to surgeon in [place] but pain is becoming untolerable, unable to sleep. Left here AMA on Saturday. C/o R flank and abdominal pain. Denies n/v/d/SOB.      64-year-old male presents with abdominal pain, reports that he has been seen at a hospital in [place] and was found to have an acute appendicitis, he left there to come here, had repeat imaging done yesterday here at this hospital which still showed an acute appendicitis, the patient reports that he was feeling better yesterday and he left against medical advice, but he reports the pain became suddenly worse and he reports that he can no longer handle the pain and so returned.  The patient was told in [facility] that he could not have surgery for 5 days because he is on Eliquis for his AICD, he reports that he has not taken his Eliquis since Friday, and reports that he has not had any fever, headache, dizziness, chest pain, cough, shortness of breath, nausea, vomiting, dysuria, hematuria, constipation or diarrhea, no other complaints.        Prior to Admission Medications   Prescriptions Last Dose Informant Patient Reported? Taking?   AMLODIPINE BESYLATE PO   Yes No   Empagliflozin 25 MG TABS  Self Yes No   Sig: Take 25 mg by mouth   alfuzosin (UROXATRAL) 10 mg 24 hr tablet  Self Yes No   Sig: Take 10 mg by mouth daily   amoxicillin-clavulanate (AUGMENTIN) 875-125 mg per tablet   No No   Sig: Take 1 tablet by mouth every 12 (twelve) hours for 10 days   apixaban (ELIQUIS) 5 mg   Yes No   Sig: Take 5 mg by mouth 2 (two) times a day   aspirin (ECOTRIN LOW STRENGTH) 81 mg EC tablet   Yes No   Sig: Take 81 mg by mouth   Patient not taking: Reported on 4/28/2023   cetirizine (ZyrTEC) 10 mg tablet   Yes No   Sig: Take 1 tablet by mouth   cyclobenzaprine (FLEXERIL) 10 mg tablet   No No   Sig: TAKE 1 TABLET BY MOUTH DAILY AT BEDTIME   dextromethorphan-guaiFENesin  (ROBITUSSIN DM)  mg/5 mL syrup   No No   Sig: Take 5 mL by mouth 3 (three) times a day as needed for cough   dutasteride (AVODART) 0.5 mg capsule  Self Yes No   Sig: Take 1 capsule by mouth daily   fluticasone (FLOVENT HFA) 110 MCG/ACT inhaler   Yes No   Sig: Inhale 2 puffs 2 (two) times a day Rinse mouth after use.   Patient not taking: Reported on 2022   fluticasone (VERAMYST) 27.5 MCG/SPRAY nasal spray   Yes No   Si sprays into each nostril daily   Patient not taking: Reported on 2022   furosemide (LASIX) 20 mg tablet   Yes No   Sig: Take 2 tablets by mouth   losartan (COZAAR) 50 mg tablet   Yes No   Sig: Take 50 mg by mouth   metFORMIN (GLUCOPHAGE) 500 mg tablet  Self Yes No   Sig: Take 1,000 mg by mouth 2 (two) times a day with meals    metoprolol succinate (TOPROL-XL) 50 mg 24 hr tablet   Yes No   Sig: Take 75 mg by mouth daily   rosuvastatin (CRESTOR) 10 MG tablet   Yes No   Sig: Take 10 mg by mouth daily   Patient not taking: Reported on 2022   sildenafil (REVATIO) 20 mg tablet   Yes No   Sig: Take 20 mg by mouth   Patient not taking: Reported on 2022   spironolactone (ALDACTONE) 25 mg tablet   Yes No   Sig: Take 25 mg by mouth daily   tadalafil (CIALIS) 20 MG tablet   Yes No   Sig: Take 20 mg by mouth   Patient not taking: Reported on 2022      Facility-Administered Medications: None       Past Medical History:   Diagnosis Date    BPH (benign prostatic hyperplasia)     Coronary artery disease 2021    Diabetes mellitus (HCC)     ICD (implantable cardioverter-defibrillator) in place     Obesity        Past Surgical History:   Procedure Laterality Date    CHOLECYSTECTOMY      HERNIA REPAIR         History reviewed. No pertinent family history.  I have reviewed and agree with the history as documented.    E-Cigarette/Vaping    E-Cigarette Use Never User      E-Cigarette/Vaping Substances    Nicotine No     THC No     CBD No     Flavoring No     Other No     Unknown No       Social History     Tobacco Use    Smoking status: Never    Smokeless tobacco: Never   Vaping Use    Vaping status: Never Used   Substance Use Topics    Alcohol use: No    Drug use: No       Review of Systems   Constitutional:  Negative for fever.   HENT:  Negative for congestion.    Eyes:  Negative for visual disturbance.   Respiratory:  Negative for cough and shortness of breath.    Cardiovascular:  Negative for chest pain.   Gastrointestinal:  Positive for abdominal pain. Negative for diarrhea and vomiting.   Endocrine: Negative for polyuria.   Genitourinary:  Negative for dysuria and hematuria.   Musculoskeletal:  Negative for myalgias.   Neurological:  Negative for light-headedness and headaches.       Physical Exam  Physical Exam  Vitals and nursing note reviewed.   Constitutional:       General: He is not in acute distress.     Appearance: Normal appearance.   HENT:      Head: Normocephalic and atraumatic.      Right Ear: External ear normal.      Left Ear: External ear normal.      Mouth/Throat:      Mouth: Mucous membranes are moist.      Pharynx: Oropharynx is clear.   Eyes:      Conjunctiva/sclera: Conjunctivae normal.      Pupils: Pupils are equal, round, and reactive to light.   Cardiovascular:      Rate and Rhythm: Normal rate and regular rhythm.   Pulmonary:      Effort: Pulmonary effort is normal. No respiratory distress.   Abdominal:      General: Abdomen is flat.      Comments: Tenderness in the left lower quadrant, mild guarding, no rebound   Musculoskeletal:         General: No deformity. Normal range of motion.      Cervical back: Normal range of motion.   Skin:     General: Skin is warm and dry.   Neurological:      General: No focal deficit present.      Mental Status: He is alert and oriented to person, place, and time.   Psychiatric:         Mood and Affect: Mood normal.         Behavior: Behavior normal.         Vital Signs  ED Triage Vitals [09/08/24 0525]   Temperature Pulse  Respirations Blood Pressure SpO2   98.2 °F (36.8 °C) 75 20 127/72 95 %      Temp Source Heart Rate Source Patient Position - Orthostatic VS BP Location FiO2 (%)   Oral Monitor Sitting Right arm --      Pain Score       10 - Worst Possible Pain           Vitals:    09/08/24 0525 09/08/24 0635   BP: 127/72 124/78   Pulse: 75 88   Patient Position - Orthostatic VS: Sitting Sitting         Visual Acuity      ED Medications  Medications   acetaminophen (TYLENOL) tablet 975 mg (975 mg Oral Not Given 9/8/24 0630)   fentaNYL injection 50 mcg (50 mcg Intravenous Given 9/8/24 0634)       Diagnostic Studies  Results Reviewed       Procedure Component Value Units Date/Time    Urine Microscopic [[id number]]  (Abnormal) Collected: 09/08/24 0609    Lab Status: Final result Specimen: Urine, Clean Catch Updated: 09/08/24 0656     RBC, UA 4-10 /hpf      WBC, UA None Seen /hpf      Epithelial Cells None Seen /hpf      Bacteria, UA None Seen /hpf      MUCUS THREADS Occasional    UA (URINE) with reflex to Scope [[id number]]  (Abnormal) Collected: 09/08/24 0609    Lab Status: Final result Specimen: Urine, Clean Catch Updated: 09/08/24 0655     Color, UA Colorless     Clarity, UA Clear     Specific Gravity, UA 1.011     pH, UA 6.0     Leukocytes, UA Elevated glucose may cause decreased leukocyte values. See urine microscopic for [facility] result     Nitrite, UA Negative     Protein, UA Negative mg/dl      Glucose, UA >=1000 (1%) mg/dl      Ketones, UA Negative mg/dl      Urobilinogen, UA <2.0 mg/dl      Bilirubin, UA Negative     Occult Blood, UA Moderate    Comprehensive metabolic panel [[id number]]  (Abnormal) Collected: 09/08/24 0609    Lab Status: Final result Specimen: Blood from Arm, Right Updated: 09/08/24 0646     Sodium 132 mmol/L      Potassium 4.0 mmol/L      Chloride 101 mmol/L      CO2 23 mmol/L      ANION GAP 8 mmol/L      BUN 19 mg/dL      Creatinine 0.97 mg/dL      Glucose 139 mg/dL      Calcium 8.6 mg/dL      AST 17 U/L      ALT  18 U/L      Alkaline Phosphatase 66 U/L      Total Protein 6.4 g/dL      Albumin 3.9 g/dL      Total Bilirubin 0.31 mg/dL      eGFR 82 ml/min/1.73sq m     Narrative:      National [employer] guidelines for Chronic Kidney Disease (CKD):     Stage 1 with normal or high GFR (GFR > 90 mL/min/1.73 square meters)    Stage 2 Mild CKD (GFR = 60-89 mL/min/1.73 square meters)    Stage 3A Moderate CKD (GFR = 45-59 mL/min/1.73 square meters)    Stage 3B Moderate CKD (GFR = 30-44 mL/min/1.73 square meters)    Stage 4 Severe CKD (GFR = 15-29 mL/min/1.73 square meters)    Stage 5 End Stage CKD (GFR <15 mL/min/1.73 square meters)  Note: GFR calculation is accurate only with a steady state creatinine    Lipase [[id number]]  (Normal) Collected: 09/08/24 0609    Lab Status: Final result Specimen: Blood from Arm, Right Updated: 09/08/24 0646     Lipase 43 u/L     APTT [[id number]]  (Normal) Collected: 09/08/24 0609    Lab Status: Final result Specimen: Blood from Arm, Right Updated: 09/08/24 0641     PTT 32 seconds     Protime-INR [[id number]]  (Normal) Collected: 09/08/24 0609    Lab Status: Final result Specimen: Blood from Arm, Right Updated: 09/08/24 0641     Protime 13.4 seconds      INR 0.95    Narrative:      INR Therapeutic Range    Indication                                             INR Range      Atrial Fibrillation                                               2.0-3.0  Hypercoagulable State                                    2.0.2.3  Left Ventricular Asist Device                            2.0-3.0  Mechanical Heart Valve                                  -    Aortic(with afib, MI, embolism, HF, LA enlargement,    and/or coagulopathy)                                     2.0-3.0 (2.5-3.5)     Mitral                                                             2.5-3.5  Prosthetic/Bioprosthetic Heart Valve               2.0-3.0  Venous thromboembolism (VTE: VT, PE        2.0-3.0    CBC and differential [[id number]]  Collected: 09/08/24 0609    Lab Status: Final result Specimen: Blood from Arm, Right Updated: 09/08/24 0629     WBC 5.57 Thousand/uL      RBC 4.82 Million/uL      Hemoglobin 14.9 g/dL      Hematocrit 43.6 %      MCV 91 fL      MCH 30.9 pg      MCHC 34.2 g/dL      RDW 12.9 %      MPV 9.2 fL      Platelets 199 Thousands/uL      nRBC 0 /100 WBCs      Segmented % 56 %      Immature Grans % 1 %      Lymphocytes % 25 %      Monocytes % 12 %      Eosinophils Relative 5 %      Basophils Relative 1 %      Absolute Neutrophils 3.15 Thousands/µL      Absolute Immature Grans 0.03 Thousand/uL      Absolute Lymphocytes 1.37 Thousands/µL      Absolute Monocytes 0.66 Thousand/µL      Eosinophils Absolute 0.29 Thousand/µL      Basophils Absolute 0.07 Thousands/µL     Blood culture #1 [[id number]] Collected: 09/08/24 0609    Lab Status: In process Specimen: Blood from Arm, Right Updated: 09/08/24 0624    Blood culture #2 [[id number]] Collected: 09/08/24 0609    Lab Status: In process Specimen: Blood from Arm, Right Updated: 09/08/24 0624                   No orders to display              Procedures  Procedures         ED Course                                 SBIRT 22yo+      Flowsheet Row Most Recent Value   Initial Alcohol Screen: US AUDIT-C     1. How often do you have a drink containing alcohol? 0 Filed at: 09/08/2024 0525   2. How many drinks containing alcohol do you have on a typical day you are drinking?  0 Filed at: 09/08/2024 0525   3a. Male UNDER 65: How often do you have five or more drinks on one occasion? 0 Filed at: 09/08/2024 0525   Audit-C Score 0 Filed at: 09/08/2024 0525   ELIF: How many times in the past year have you...    Used an illegal drug or used a prescription medication for non-medical reasons? Never Filed at: 09/08/2024 0525                      Medical Decision Making  64-year-old male presents with abdominal pain, was previously found to have appendicitis, left AMA, is returning now for further  management of his appendicitis.  Patient reports no fevers, nausea, vomiting, and no other complaints, will be given analgesia and will be consulted with surgery for further management.    Patient care be transferred to Dr. Blanco, pending surgery recommendations and likely admission for further management of acute appendicitis.    Amount and/or Complexity of Data Reviewed  Labs: ordered.    Risk  OTC drugs.  Prescription drug management.                 Disposition  Final diagnoses:   Acute appendicitis     Time reflects when diagnosis was documented in both MDM as applicable and the Disposition within this note       Time User Action Codes Description Comment    9/8/2024  7:12 AM Elan Aragon Add [K35.80] Acute appendicitis           ED Disposition       None          Follow-up Information    None         Patient's Medications   Discharge Prescriptions    No medications on file       No discharge procedures on file.    PDMP Review         Value Time User    PDMP Reviewed  Yes 9/7/2024  2:16 AM Irwin Molina MD            ED Provider  Electronically Signed by             Elan Aragon MD  09/08/24 0713

## 2024-09-08 NOTE — H&P
General Surgery H&P Exam   Dexter Reyes 64 y.o. male MRN: [id number]    Unit/Bed#: ED-03 Encounter: [id number]    Assessment:  64 y.o. male with acute appendicitis.  Patient previously evaluated in [facility] and determined to proceed with nonoperative management with p.o. antibiotics (Augmentin) with interval appendectomy with his preferred surgeon.  Patient presented yesterday morning due to forgetting antibiotics in [place] and having abdominal pain, he was discharged on 10 days of Augmentin with plan for follow-up with surgeon in 3 weeks.  Patient presents today with worsening abdominal pain and now wants to discuss surgery.     Plan:  - NPO for possible surgery  - Will discuss surgical options  - Isolyte 50  - IV rocephin/flagyl  - Pain and nausea control  - DVT ppx  - Cardiology consultation       History of Present Illness   64 y.o. male with PMH ICD, afib on Eliquis, CAD, DM, prior lap steven and ventral and inguinal hernia repairs who re-presents with acute appendicitis.  Patient was previously evaluated in [facility] and determined to proceed with nonoperative management with p.o. antibiotics (Augmentin) with interval appendectomy with his preferred surgeon.  Patient presented yesterday morning to [facility] ED due to forgetting antibiotics in [place] and having abdominal pain, he was discharged on 10 days of Augmentin with plan for follow-up with surgeon in 3 weeks per patient preference. Patient presents today with worsening abdominal pain and now wishes to discuss surgery. CT abdomen pelvis yesterday and in [place] showing signs of acute appendicitis, no repeat imaging this AM. He denies nausea, vomiting, or changes in appetite. His pain is in the RLQ and R flank. WBC 5.5 from 7 yesterday.     Review of Systems   Constitutional:  Negative for activity change.   HENT:  Negative for congestion.    Eyes:  Negative for discharge.   Respiratory:  Negative for cough.    Cardiovascular:  Negative for chest pain.    Gastrointestinal:  Positive for abdominal distention and abdominal pain.   Musculoskeletal:  Negative for arthralgias.   Skin:  Negative for color change.   Neurological:  Negative for facial asymmetry.   Psychiatric/Behavioral:  Negative for agitation.        Historical Information   Past Medical History:   Diagnosis Date    BPH (benign prostatic hyperplasia)     Coronary artery disease 9/1/2021    Diabetes mellitus (HCC)     ICD (implantable cardioverter-defibrillator) in place     Obesity      Past Surgical History:   Procedure Laterality Date    CHOLECYSTECTOMY      HERNIA REPAIR       Social History   Social History     Substance and Sexual Activity   Alcohol Use No     Social History     Substance and Sexual Activity   Drug Use No     Social History     Tobacco Use   Smoking Status Never   Smokeless Tobacco Never     E-Cigarette Use: Never User     E-Cigarette/Vaping Substances    Nicotine No     THC No     CBD No     Flavoring No     Other No     Unknown No        Family History: History reviewed. No pertinent family history.    Meds/Allergies   PTA meds:   Prior to Admission Medications   Prescriptions Last Dose Informant Patient Reported? Taking?   AMLODIPINE BESYLATE PO   Yes No   Empagliflozin 25 MG TABS  Self Yes No   Sig: Take 25 mg by mouth   alfuzosin (UROXATRAL) 10 mg 24 hr tablet  Self Yes No   Sig: Take 10 mg by mouth daily   amoxicillin-clavulanate (AUGMENTIN) 875-125 mg per tablet   No No   Sig: Take 1 tablet by mouth every 12 (twelve) hours for 10 days   apixaban (ELIQUIS) 5 mg   Yes No   Sig: Take 5 mg by mouth 2 (two) times a day   aspirin (ECOTRIN LOW STRENGTH) 81 mg EC tablet   Yes No   Sig: Take 81 mg by mouth   Patient not taking: Reported on 4/28/2023   cetirizine (ZyrTEC) 10 mg tablet   Yes No   Sig: Take 1 tablet by mouth   cyclobenzaprine (FLEXERIL) 10 mg tablet   No No   Sig: TAKE 1 TABLET BY MOUTH DAILY AT BEDTIME   dextromethorphan-guaiFENesin (ROBITUSSIN DM)  mg/5 mL syrup    No No   Sig: Take 5 mL by mouth 3 (three) times a day as needed for cough   dutasteride (AVODART) 0.5 mg capsule  Self Yes No   Sig: Take 1 capsule by mouth daily   fluticasone (FLOVENT HFA) 110 MCG/ACT inhaler   Yes No   Sig: Inhale 2 puffs 2 (two) times a day Rinse mouth after use.   Patient not taking: Reported on 2022   fluticasone (VERAMYST) 27.5 MCG/SPRAY nasal spray   Yes No   Si sprays into each nostril daily   Patient not taking: Reported on 2022   furosemide (LASIX) 20 mg tablet   Yes No   Sig: Take 2 tablets by mouth   losartan (COZAAR) 50 mg tablet   Yes No   Sig: Take 50 mg by mouth   metFORMIN (GLUCOPHAGE) 500 mg tablet  Self Yes No   Sig: Take 1,000 mg by mouth 2 (two) times a day with meals    metoprolol succinate (TOPROL-XL) 50 mg 24 hr tablet   Yes No   Sig: Take 75 mg by mouth daily   rosuvastatin (CRESTOR) 10 MG tablet   Yes No   Sig: Take 10 mg by mouth daily   Patient not taking: Reported on 2022   sildenafil (REVATIO) 20 mg tablet   Yes No   Sig: Take 20 mg by mouth   Patient not taking: Reported on 2022   spironolactone (ALDACTONE) 25 mg tablet   Yes No   Sig: Take 25 mg by mouth daily   tadalafil (CIALIS) 20 MG tablet   Yes No   Sig: Take 20 mg by mouth   Patient not taking: Reported on 2022      Facility-Administered Medications: None     Allergies   Allergen Reactions    Liraglutide Shortness Of Breath    Pollen Extract Shortness Of Breath       Objective   First Vitals:   Blood Pressure: 127/72 (24)  Pulse: 75 (24)  Temperature: 98.2 °F (36.8 °C) (24)  Temp Source: Oral (24)  Respirations: 20 (24)  SpO2: 95 % (24)    Current Vitals:   Blood Pressure: 124/78 (24)  Pulse: 88 (24)  Temperature: 98.2 °F (36.8 °C) (24)  Temp Source: Oral (24)  Respirations: 18 (24)  SpO2: 96 % (24)    No intake or output data in the 24 hours ending  09/08/24 0847    Invasive Devices       Peripheral Intravenous Line  Duration             Peripheral IV 09/08/24 Right;Ventral (anterior) Forearm <1 day                    Physical Exam  HENT:      Head: Normocephalic and atraumatic.      Right Ear: External ear normal.      Left Ear: External ear normal.      Nose: Nose normal.      Mouth/Throat:      Pharynx: Oropharynx is clear.   Eyes:      Extraocular Movements: Extraocular movements intact.   Cardiovascular:      Rate and Rhythm: Normal rate.   Pulmonary:      Effort: Pulmonary effort is normal.   Abdominal:      General: Abdomen is flat.      Palpations: Abdomen is soft.      Tenderness: There is abdominal tenderness.   Musculoskeletal:         General: Normal range of motion.      Cervical back: Normal range of motion.   Skin:     General: Skin is warm and dry.   Neurological:      Mental Status: He is alert. Mental status is at baseline.   Psychiatric:         Mood and Affect: Mood normal.         Behavior: Behavior normal.         Lab Results: WBC 5 from 7  Imaging: CT AP 9/7: Acute appendicitis as previously noted on outside CT from 9/5/2024. No abscess or free air identified.    Code Status: Level 1 - Full Code  Advance Directive and Living Will:      Power of :    POLST:

## 2024-09-08 NOTE — ED NOTES
Pt comes to nurse's station multiple times with questions and requests for pain meds. Dr Monaco made aware.      April Acuna RN  09/08/24 0751

## 2024-09-09 ENCOUNTER — APPOINTMENT (OUTPATIENT)
Dept: NON INVASIVE DIAGNOSTICS | Facility: HOSPITAL | Age: 64
End: 2024-09-09
Payer: COMMERCIAL

## 2024-09-09 VITALS
WEIGHT: 244.71 LBS | TEMPERATURE: 97.5 F | RESPIRATION RATE: 16 BRPM | SYSTOLIC BLOOD PRESSURE: 125 MMHG | DIASTOLIC BLOOD PRESSURE: 83 MMHG | HEIGHT: 70 IN | HEART RATE: 79 BPM | OXYGEN SATURATION: 96 % | BODY MASS INDEX: 35.03 KG/M2

## 2024-09-09 PROBLEM — K35.80 ACUTE APPENDICITIS: Status: ACTIVE | Noted: 2024-09-09

## 2024-09-09 LAB
ANION GAP SERPL CALCULATED.3IONS-SCNC: 6 MMOL/L (ref 4–13)
AORTIC ROOT: 3.8 CM
APICAL FOUR CHAMBER EJECTION FRACTION: 43 %
ASCENDING AORTA: 3.4 CM
BSA FOR ECHO PROCEDURE: 2.27 M2
BUN SERPL-MCNC: 9 MG/DL (ref 5–25)
CALCIUM SERPL-MCNC: 9.1 MG/DL (ref 8.4–10.2)
CHLORIDE SERPL-SCNC: 105 MMOL/L (ref 96–108)
CO2 SERPL-SCNC: 25 MMOL/L (ref 21–32)
CREAT SERPL-MCNC: 0.82 MG/DL (ref 0.6–1.3)
E WAVE DECELERATION TIME: 195 MS
E/A RATIO: 1.15
ERYTHROCYTE [DISTWIDTH] IN BLOOD BY AUTOMATED COUNT: 13.2 % (ref 11.6–15.1)
FRACTIONAL SHORTENING: 23 (ref 28–44)
GFR SERPL CREATININE-BSD FRML MDRD: 94 ML/MIN/1.73SQ M
GLUCOSE P FAST SERPL-MCNC: 98 MG/DL (ref 65–99)
GLUCOSE SERPL-MCNC: 138 MG/DL (ref 65–140)
GLUCOSE SERPL-MCNC: 98 MG/DL (ref 65–140)
HCT VFR BLD AUTO: 43.7 % (ref 36.5–49.3)
HGB BLD-MCNC: 14.8 G/DL (ref 12–17)
INTERVENTRICULAR SEPTUM IN DIASTOLE (PARASTERNAL SHORT AXIS VIEW): 1.4 CM
INTERVENTRICULAR SEPTUM: 1.4 CM (ref 0.6–1.1)
LAAS-AP2: 21.4 CM2
LAAS-AP4: 23 CM2
LEFT ATRIUM SIZE: 4.5 CM
LEFT ATRIUM VOLUME (MOD BIPLANE): 69 ML
LEFT ATRIUM VOLUME INDEX (MOD BIPLANE): 30.4 ML/M2
LEFT INTERNAL DIMENSION IN SYSTOLE: 4.1 CM (ref 2.1–4)
LEFT VENTRICLE DIASTOLIC VOLUME (MOD BIPLANE): 155 ML
LEFT VENTRICLE DIASTOLIC VOLUME INDEX (MOD BIPLANE): 68.3 ML/M2
LEFT VENTRICLE SYSTOLIC VOLUME (MOD BIPLANE): 88 ML
LEFT VENTRICLE SYSTOLIC VOLUME INDEX (MOD BIPLANE): 38.8 ML/M2
LEFT VENTRICULAR INTERNAL DIMENSION IN DIASTOLE: 5.3 CM (ref 3.5–6)
LEFT VENTRICULAR POSTERIOR WALL IN END DIASTOLE: 1.2 CM
LEFT VENTRICULAR STROKE VOLUME: 58 ML
LV EF: 43 %
LVSV (TEICH): 58 ML
MCH RBC QN AUTO: 30.8 PG (ref 26.8–34.3)
MCHC RBC AUTO-ENTMCNC: 33.9 G/DL (ref 31.4–37.4)
MCV RBC AUTO: 91 FL (ref 82–98)
MV E'TISSUE VEL-SEP: 8 CM/S
MV PEAK A VEL: 0.75 M/S
MV PEAK E VEL: 86 CM/S
MV STENOSIS PRESSURE HALF TIME: 57 MS
MV VALVE AREA P 1/2 METHOD: 3.86
PLATELET # BLD AUTO: 215 THOUSANDS/UL (ref 149–390)
PMV BLD AUTO: 9 FL (ref 8.9–12.7)
POTASSIUM SERPL-SCNC: 4.4 MMOL/L (ref 3.5–5.3)
RBC # BLD AUTO: 4.81 MILLION/UL (ref 3.88–5.62)
RIGHT ATRIAL 2D VOLUME: 59 ML
RIGHT ATRIUM AREA SYSTOLE A4C: 20.2 CM2
RIGHT VENTRICLE ID DIMENSION: 4.5 CM
SL CV LEFT ATRIUM LENGTH A2C: 5.5 CM
SL CV LV EF: 45
SL CV PED ECHO LEFT VENTRICLE DIASTOLIC VOLUME (MOD BIPLANE) 2D: 133 ML
SL CV PED ECHO LEFT VENTRICLE SYSTOLIC VOLUME (MOD BIPLANE) 2D: 75 ML
SODIUM SERPL-SCNC: 136 MMOL/L (ref 135–147)
TR MAX PG: 29 MMHG
TR PEAK VELOCITY: 2.7 M/S
TRICUSPID ANNULAR PLANE SYSTOLIC EXCURSION: 1.8 CM
TRICUSPID VALVE PEAK REGURGITATION VELOCITY: 2.7 M/S
WBC # BLD AUTO: 3.36 THOUSAND/UL (ref 4.31–10.16)

## 2024-09-09 PROCEDURE — 93306 TTE W/DOPPLER COMPLETE: CPT

## 2024-09-09 PROCEDURE — 80048 BASIC METABOLIC PNL TOTAL CA: CPT

## 2024-09-09 PROCEDURE — 82948 REAGENT STRIP/BLOOD GLUCOSE: CPT

## 2024-09-09 PROCEDURE — 99232 SBSQ HOSP IP/OBS MODERATE 35: CPT | Performed by: SURGERY

## 2024-09-09 PROCEDURE — 93306 TTE W/DOPPLER COMPLETE: CPT | Performed by: INTERNAL MEDICINE

## 2024-09-09 PROCEDURE — 85027 COMPLETE CBC AUTOMATED: CPT

## 2024-09-09 RX ORDER — LIDOCAINE 50 MG/G
1 PATCH TOPICAL DAILY
Qty: 5 PATCH | Refills: 0 | Status: SHIPPED | OUTPATIENT
Start: 2024-09-09 | End: 2024-09-14

## 2024-09-09 RX ORDER — METHOCARBAMOL 500 MG/1
500 TABLET, FILM COATED ORAL 4 TIMES DAILY
Qty: 20 TABLET | Refills: 0 | Status: SHIPPED | OUTPATIENT
Start: 2024-09-09

## 2024-09-09 RX ADMIN — OXYCODONE HYDROCHLORIDE 5 MG: 5 TABLET ORAL at 11:48

## 2024-09-09 RX ADMIN — FINASTERIDE 5 MG: 5 TABLET, FILM COATED ORAL at 08:54

## 2024-09-09 RX ADMIN — METOPROLOL SUCCINATE 75 MG: 50 TABLET, EXTENDED RELEASE ORAL at 08:54

## 2024-09-09 RX ADMIN — OXYCODONE HYDROCHLORIDE 5 MG: 5 TABLET ORAL at 02:05

## 2024-09-09 RX ADMIN — ACETAMINOPHEN 975 MG: 325 TABLET ORAL at 02:05

## 2024-09-09 RX ADMIN — HYDROMORPHONE HYDROCHLORIDE 0.2 MG: 0.2 INJECTION, SOLUTION INTRAMUSCULAR; INTRAVENOUS; SUBCUTANEOUS at 05:30

## 2024-09-09 RX ADMIN — CEFTRIAXONE SODIUM 2000 MG: 10 INJECTION, POWDER, FOR SOLUTION INTRAVENOUS at 10:26

## 2024-09-09 RX ADMIN — METRONIDAZOLE 500 MG: 500 INJECTION, SOLUTION INTRAVENOUS at 00:45

## 2024-09-09 RX ADMIN — ENOXAPARIN SODIUM 40 MG: 40 INJECTION SUBCUTANEOUS at 08:55

## 2024-09-09 RX ADMIN — OXYCODONE HYDROCHLORIDE 5 MG: 5 TABLET ORAL at 08:54

## 2024-09-09 RX ADMIN — METRONIDAZOLE 500 MG: 500 INJECTION, SOLUTION INTRAVENOUS at 08:55

## 2024-09-09 RX ADMIN — METHOCARBAMOL TABLETS 500 MG: 500 TABLET, COATED ORAL at 00:45

## 2024-09-09 RX ADMIN — HYDROMORPHONE HYDROCHLORIDE 0.2 MG: 0.2 INJECTION, SOLUTION INTRAMUSCULAR; INTRAVENOUS; SUBCUTANEOUS at 10:27

## 2024-09-09 RX ADMIN — METHOCARBAMOL TABLETS 500 MG: 500 TABLET, COATED ORAL at 05:30

## 2024-09-09 RX ADMIN — METHOCARBAMOL TABLETS 500 MG: 500 TABLET, COATED ORAL at 11:49

## 2024-09-09 NOTE — DISCHARGE INSTR - AVS FIRST PAGE
Please follow up with your cardiologist.  Follow up with your surgeon to discuss interval laparoscopic appendectomy. Please call your surgeon or return to ED with any worsening pain, fevers over 101.   Follow up with urology to discuss fluid collection within the right hemiscrotum

## 2024-09-09 NOTE — PROGRESS NOTES
"General Surgery  Progress Note   Dexter Reyes 64 y.o. male MRN: [id number]  Unit/Bed#: S -01 Encounter: [id number]    Assessment:  64-year-old gentleman with a history of cardiomyopathy and AICD on Eliquis for A-fib and diabetes with acute appendicitis diagnosed on  being treated nonoperatively, additionally seen on  for abdominal pain and discharge from the ED with antibiotics, returned to [facility] with persistent abdominal pain. .    Vital signs stable, afebrile.  On room air    No document I/O    WBC 3.36 (5.57)  Hgb 14.8 (14.9)  BMP pending    Plan:  N.p.o. pending further evaluation today.  Discussed risks with patient that operative management at this time would be increased risk due to 5-6 days of appendicitis.  Discussed possible surgery with increased risk versus proceeding with nonoperative management and plan for interval appendectomy with patient.  He again preferred to proceed with interval appendectomy.  IV fluids held given prior EF 24%, pending echo  Cardiology consulted appreciate [id number]-sn echo  Continue IV Rocephin/Flagyl  DVT ppx: Lovenox  Pain/ nausea control PRN  OOB/ ambulation  Incentive Spirometry      Subjective/Objective     Subjective:   Patient seen and examined at bedside, in no acute distress. No acute events overnight.  Continues to report right flank pain, no abdominal pain.  Denies nausea or vomiting.  Has been out of bed and ambulating.    Objective:   Vitals:Blood pressure 102/61, pulse 71, temperature (!) 97.3 °F (36.3 °C), resp. rate 16, height 5' 10\" (1.778 m), weight 111 kg (244 lb 10.7 oz), SpO2 97%.  Temp (24hrs), Av.5 °F (36.4 °C), Min:97.3 °F (36.3 °C), Max:97.7 °F (36.5 °C)      No intake or output data in the 24 hours ending 24 0643    Invasive Devices       Peripheral Intravenous Line  Duration             Peripheral IV 24 Right;Ventral (anterior) Forearm 1 day                    Physical Exam:  General: " "No acute distress, alert and oriented  CV: Well perfused, regular rate and rhythm  Lungs: Normal work of breathing, no increased respiratory effort on room air  Abdomen: Soft, non-tender, non-distended  Extremities: No edema, clubbing or cyanosis  Skin: Warm, dry    Lab Results: BMP/CMP: No results found for: \"SODIUM\", \"K\", \"CL\", \"CO2\", \"ANIONGAP\", \"BUN\", \"CREATININE\", \"GLUCOSE\", \"CALCIUM\", \"AST\", \"ALT\", \"ALKPHOS\", \"PROT\", \"BILITOT\", \"EGFR\" and CBC:   Lab Results   Component Value Date    WBC 3.36 (L) 09/09/2024    HGB 14.8 09/09/2024    HCT 43.7 09/09/2024    MCV 91 09/09/2024     09/09/2024    RBC 4.81 09/09/2024    MCH 30.8 09/09/2024    MCHC 33.9 09/09/2024    RDW 13.2 09/09/2024    MPV 9.0 09/09/2024     VTE Prophylaxis: Sequential compression device (Venodyne)  and Enoxaparin (Lovenox)    Moon Sepulveda MD  9/9/2024    "

## 2024-09-09 NOTE — QUICK NOTE
Quick Note    Was called to this patient's room for back pain. Pt states his back pain is in upper back and has been present since Saturday. States pain is resolved with narcotics. Hurts worse when sitting in certain positions and lying on his side with his arm above his head.  Pt denies pain like this in the past. Denies prior kidney stones, dysuria, hematuria. Denies numbness, tingling in LE. Reproducible on exam. Tenderness lateral upper back just below ribs.    CT scan done 2 days ago does with appendicitis.     Given tenderness on exam at lateral paraspinal muscles and worsening with certain positions, consider etiology to be musculoskeletal vs 2/2 retrocecal appendicitis. Pt did state his RLQ pain has resolved. He is nontender in RLQ. Pt states robaxin significantly helps his pain.   Will send pt with robaxin/lidocaine patches. Pt states he has an appointment with his PCP tomorrow. Discussed with him he should also discuss this with PCP. If robaxin/lidocaine patches do not improve pt's pain in the next couple days, or if his pain worsens he should call or come back to ED since pain could be 2/2 appendicitis.  This was discussed with pt at bedside. He is eager for discharge. He expressed understanding and was amenable to plan.    Phoebe Sykes PA-C  9/9/2024 12:17 PM

## 2024-09-09 NOTE — INCIDENTAL FINDINGS
The following findings require follow up:  Radiographic finding   Finding: Partially imaged thick-walled fluid collection within the right hemiscrotum. This may represent a complex hydrocele or chronic hematoma/seroma in the setting of right inguinal hernia repair. Underlying mass or abscess not excluded    Follow up required: scrotal ultrasound or dedicated pelvic CT with contrast    Follow up should be done within 1 month(s)    Please notify the following clinician to assist with the follow up:   Dr. Nomi Dawn (urology)    Incidental finding results were discussed with the Patient by Phoebe Sykes PA-C on 09/09/24.   They expressed understanding and all questions answered.

## 2024-09-12 LAB
BACTERIA BLD CULT: NORMAL
BACTERIA BLD CULT: NORMAL

## 2024-09-13 LAB
BACTERIA BLD CULT: NORMAL
BACTERIA BLD CULT: NORMAL

## 2024-09-26 ENCOUNTER — TELEPHONE (OUTPATIENT)
Dept: SURGERY | Facility: CLINIC | Age: 64
End: 2024-09-26

## 2024-09-26 NOTE — TELEPHONE ENCOUNTER
Called pt to schedule appointment, left a message to call the office. Pt is to be scheduled with Dr. Clarke.

## 2024-10-01 ENCOUNTER — TELEPHONE (OUTPATIENT)
Dept: SURGERY | Facility: CLINIC | Age: 64
End: 2024-10-01

## 2024-10-01 NOTE — TELEPHONE ENCOUNTER
Left message for Dexter to call the office to schedule an appointment with Dr Clarke for the beginning of November regarding his appendix.

## 2024-10-02 ENCOUNTER — TELEPHONE (OUTPATIENT)
Dept: SURGERY | Facility: CLINIC | Age: 64
End: 2024-10-02

## 2024-10-02 NOTE — TELEPHONE ENCOUNTER
Left message for Dexter to schedule an appointment with Dr. Clarke for the beginning of November to discuss appendectomy.

## 2025-03-15 ENCOUNTER — APPOINTMENT (EMERGENCY)
Dept: CT IMAGING | Facility: HOSPITAL | Age: 65
End: 2025-03-15
Payer: COMMERCIAL

## 2025-03-15 ENCOUNTER — HOSPITAL ENCOUNTER (INPATIENT)
Facility: HOSPITAL | Age: 65
LOS: 1 days | Discharge: LEFT BEFORE DISCHARGE | End: 2025-03-15
Attending: EMERGENCY MEDICINE | Admitting: INTERNAL MEDICINE
Payer: COMMERCIAL

## 2025-03-15 ENCOUNTER — APPOINTMENT (EMERGENCY)
Dept: RADIOLOGY | Facility: HOSPITAL | Age: 65
End: 2025-03-15
Payer: COMMERCIAL

## 2025-03-15 VITALS
BODY MASS INDEX: 29.35 KG/M2 | RESPIRATION RATE: 20 BRPM | TEMPERATURE: 98.7 F | HEART RATE: 87 BPM | WEIGHT: 205.03 LBS | HEIGHT: 70 IN | SYSTOLIC BLOOD PRESSURE: 111 MMHG | DIASTOLIC BLOOD PRESSURE: 74 MMHG | OXYGEN SATURATION: 97 %

## 2025-03-15 DIAGNOSIS — R07.9 CHEST PAIN: ICD-10-CM

## 2025-03-15 DIAGNOSIS — N17.9 AKI (ACUTE KIDNEY INJURY) (HCC): ICD-10-CM

## 2025-03-15 DIAGNOSIS — E87.1 HYPONATREMIA: Primary | ICD-10-CM

## 2025-03-15 DIAGNOSIS — R25.2 CRAMPING OF FEET: ICD-10-CM

## 2025-03-15 DIAGNOSIS — R25.2 CRAMPING OF HANDS: ICD-10-CM

## 2025-03-15 DIAGNOSIS — E83.42 HYPOMAGNESEMIA: ICD-10-CM

## 2025-03-15 DIAGNOSIS — R06.02 SOB (SHORTNESS OF BREATH): ICD-10-CM

## 2025-03-15 PROBLEM — G47.33 OBSTRUCTIVE SLEEP APNEA: Status: ACTIVE | Noted: 2025-03-15

## 2025-03-15 PROBLEM — F19.10 SUBSTANCE ABUSE (HCC): Status: ACTIVE | Noted: 2025-03-15

## 2025-03-15 PROBLEM — I50.22 HEART FAILURE WITH MILDLY REDUCED EJECTION FRACTION (HCC): Status: ACTIVE | Noted: 2025-03-15

## 2025-03-15 LAB
2HR DELTA HS TROPONIN: 17 NG/L
4HR DELTA HS TROPONIN: 33 NG/L
ALBUMIN SERPL BCG-MCNC: 4.4 G/DL (ref 3.5–5)
ALP SERPL-CCNC: 48 U/L (ref 34–104)
ALT SERPL W P-5'-P-CCNC: 19 U/L (ref 7–52)
AMPHETAMINES SERPL QL SCN: NEGATIVE
ANION GAP SERPL CALCULATED.3IONS-SCNC: 12 MMOL/L (ref 4–13)
ANION GAP SERPL CALCULATED.3IONS-SCNC: 9 MMOL/L (ref 4–13)
AST SERPL W P-5'-P-CCNC: 24 U/L (ref 13–39)
BARBITURATES UR QL: NEGATIVE
BASOPHILS # BLD AUTO: 0.05 THOUSANDS/ÂΜL (ref 0–0.1)
BASOPHILS NFR BLD AUTO: 1 % (ref 0–1)
BENZODIAZ UR QL: NEGATIVE
BILIRUB DIRECT SERPL-MCNC: 0.18 MG/DL (ref 0–0.2)
BILIRUB SERPL-MCNC: 0.75 MG/DL (ref 0.2–1)
BILIRUB UR QL STRIP: NEGATIVE
BNP SERPL-MCNC: 141 PG/ML (ref 0–100)
BUN SERPL-MCNC: 11 MG/DL (ref 5–25)
BUN SERPL-MCNC: 14 MG/DL (ref 5–25)
CALCIUM SERPL-MCNC: 9.3 MG/DL (ref 8.4–10.2)
CALCIUM SERPL-MCNC: 9.4 MG/DL (ref 8.4–10.2)
CARDIAC TROPONIN I PNL SERPL HS: 28 NG/L (ref ?–50)
CARDIAC TROPONIN I PNL SERPL HS: 45 NG/L (ref ?–50)
CARDIAC TROPONIN I PNL SERPL HS: 61 NG/L (ref ?–50)
CHLORIDE SERPL-SCNC: 95 MMOL/L (ref 96–108)
CHLORIDE SERPL-SCNC: 99 MMOL/L (ref 96–108)
CK SERPL-CCNC: 365 U/L (ref 39–308)
CLARITY UR: CLEAR
CO2 SERPL-SCNC: 19 MMOL/L (ref 21–32)
CO2 SERPL-SCNC: 20 MMOL/L (ref 21–32)
COCAINE UR QL: POSITIVE
COLOR UR: NORMAL
CREAT SERPL-MCNC: 1 MG/DL (ref 0.6–1.3)
CREAT SERPL-MCNC: 1.42 MG/DL (ref 0.6–1.3)
EOSINOPHIL # BLD AUTO: 0 THOUSAND/ÂΜL (ref 0–0.61)
EOSINOPHIL NFR BLD AUTO: 0 % (ref 0–6)
ERYTHROCYTE [DISTWIDTH] IN BLOOD BY AUTOMATED COUNT: 11.8 % (ref 11.6–15.1)
FENTANYL UR QL SCN: NEGATIVE
GFR SERPL CREATININE-BSD FRML MDRD: 52 ML/MIN/1.73SQ M
GFR SERPL CREATININE-BSD FRML MDRD: 79 ML/MIN/1.73SQ M
GLUCOSE SERPL-MCNC: 147 MG/DL (ref 65–140)
GLUCOSE SERPL-MCNC: 83 MG/DL (ref 65–140)
GLUCOSE UR STRIP-MCNC: NEGATIVE MG/DL
HCT VFR BLD AUTO: 41 % (ref 36.5–49.3)
HGB BLD-MCNC: 15.2 G/DL (ref 12–17)
HGB UR QL STRIP.AUTO: NEGATIVE
HYDROCODONE UR QL SCN: NEGATIVE
IMM GRANULOCYTES # BLD AUTO: 0.03 THOUSAND/UL (ref 0–0.2)
IMM GRANULOCYTES NFR BLD AUTO: 0 % (ref 0–2)
KETONES UR STRIP-MCNC: NEGATIVE MG/DL
LEUKOCYTE ESTERASE UR QL STRIP: NEGATIVE
LIPASE SERPL-CCNC: 15 U/L (ref 11–82)
LYMPHOCYTES # BLD AUTO: 0.81 THOUSANDS/ÂΜL (ref 0.6–4.47)
LYMPHOCYTES NFR BLD AUTO: 9 % (ref 14–44)
MAGNESIUM SERPL-MCNC: 1.6 MG/DL (ref 1.9–2.7)
MCH RBC QN AUTO: 31.5 PG (ref 26.8–34.3)
MCHC RBC AUTO-ENTMCNC: 37.1 G/DL (ref 31.4–37.4)
MCV RBC AUTO: 85 FL (ref 82–98)
METHADONE UR QL: NEGATIVE
MONOCYTES # BLD AUTO: 0.59 THOUSAND/ÂΜL (ref 0.17–1.22)
MONOCYTES NFR BLD AUTO: 7 % (ref 4–12)
NEUTROPHILS # BLD AUTO: 7.22 THOUSANDS/ÂΜL (ref 1.85–7.62)
NEUTS SEG NFR BLD AUTO: 83 % (ref 43–75)
NITRITE UR QL STRIP: NEGATIVE
NRBC BLD AUTO-RTO: 0 /100 WBCS
OPIATES UR QL SCN: NEGATIVE
OSMOLALITY UR/SERPL-RTO: 271 MMOL/KG (ref 282–298)
OSMOLALITY UR: 179 MMOL/KG (ref 250–900)
OXYCODONE+OXYMORPHONE UR QL SCN: NEGATIVE
PCP UR QL: NEGATIVE
PH UR STRIP.AUTO: 6 [PH]
PLATELET # BLD AUTO: 306 THOUSANDS/UL (ref 149–390)
PMV BLD AUTO: 8.7 FL (ref 8.9–12.7)
POTASSIUM SERPL-SCNC: 3.8 MMOL/L (ref 3.5–5.3)
POTASSIUM SERPL-SCNC: 4 MMOL/L (ref 3.5–5.3)
PROT SERPL-MCNC: 6.8 G/DL (ref 6.4–8.4)
PROT UR STRIP-MCNC: NEGATIVE MG/DL
RBC # BLD AUTO: 4.82 MILLION/UL (ref 3.88–5.62)
SODIUM SERPL-SCNC: 126 MMOL/L (ref 135–147)
SODIUM SERPL-SCNC: 128 MMOL/L (ref 135–147)
SODIUM UR-SCNC: 22 MMOL/L
SP GR UR STRIP.AUTO: 1.01 (ref 1–1.03)
THC UR QL: NEGATIVE
TSH SERPL DL<=0.05 MIU/L-ACNC: 0.56 UIU/ML (ref 0.45–4.5)
URATE SERPL-MCNC: 5.5 MG/DL (ref 3.5–8.5)
UROBILINOGEN UR STRIP-ACNC: <2 MG/DL
WBC # BLD AUTO: 8.7 THOUSAND/UL (ref 4.31–10.16)

## 2025-03-15 PROCEDURE — 71045 X-RAY EXAM CHEST 1 VIEW: CPT

## 2025-03-15 PROCEDURE — 85025 COMPLETE CBC W/AUTO DIFF WBC: CPT

## 2025-03-15 PROCEDURE — 96375 TX/PRO/DX INJ NEW DRUG ADDON: CPT

## 2025-03-15 PROCEDURE — 96374 THER/PROPH/DIAG INJ IV PUSH: CPT

## 2025-03-15 PROCEDURE — 80307 DRUG TEST PRSMV CHEM ANLYZR: CPT | Performed by: INTERNAL MEDICINE

## 2025-03-15 PROCEDURE — 84550 ASSAY OF BLOOD/URIC ACID: CPT | Performed by: INTERNAL MEDICINE

## 2025-03-15 PROCEDURE — 84443 ASSAY THYROID STIM HORMONE: CPT | Performed by: INTERNAL MEDICINE

## 2025-03-15 PROCEDURE — 36415 COLL VENOUS BLD VENIPUNCTURE: CPT

## 2025-03-15 PROCEDURE — 83735 ASSAY OF MAGNESIUM: CPT

## 2025-03-15 PROCEDURE — 99285 EMERGENCY DEPT VISIT HI MDM: CPT | Performed by: EMERGENCY MEDICINE

## 2025-03-15 PROCEDURE — 84484 ASSAY OF TROPONIN QUANT: CPT

## 2025-03-15 PROCEDURE — 83935 ASSAY OF URINE OSMOLALITY: CPT | Performed by: INTERNAL MEDICINE

## 2025-03-15 PROCEDURE — 83690 ASSAY OF LIPASE: CPT | Performed by: EMERGENCY MEDICINE

## 2025-03-15 PROCEDURE — 81003 URINALYSIS AUTO W/O SCOPE: CPT

## 2025-03-15 PROCEDURE — 80048 BASIC METABOLIC PNL TOTAL CA: CPT

## 2025-03-15 PROCEDURE — 84300 ASSAY OF URINE SODIUM: CPT | Performed by: INTERNAL MEDICINE

## 2025-03-15 PROCEDURE — 99284 EMERGENCY DEPT VISIT MOD MDM: CPT

## 2025-03-15 PROCEDURE — 83930 ASSAY OF BLOOD OSMOLALITY: CPT | Performed by: INTERNAL MEDICINE

## 2025-03-15 PROCEDURE — 83880 ASSAY OF NATRIURETIC PEPTIDE: CPT | Performed by: EMERGENCY MEDICINE

## 2025-03-15 PROCEDURE — 80048 BASIC METABOLIC PNL TOTAL CA: CPT | Performed by: INTERNAL MEDICINE

## 2025-03-15 PROCEDURE — 82550 ASSAY OF CK (CPK): CPT | Performed by: EMERGENCY MEDICINE

## 2025-03-15 PROCEDURE — 93005 ELECTROCARDIOGRAM TRACING: CPT

## 2025-03-15 PROCEDURE — 80076 HEPATIC FUNCTION PANEL: CPT | Performed by: EMERGENCY MEDICINE

## 2025-03-15 PROCEDURE — 99238 HOSP IP/OBS DSCHRG MGMT 30/<: CPT | Performed by: INTERNAL MEDICINE

## 2025-03-15 RX ORDER — TAMSULOSIN HYDROCHLORIDE 0.4 MG/1
0.4 CAPSULE ORAL
Status: DISCONTINUED | OUTPATIENT
Start: 2025-03-15 | End: 2025-03-15 | Stop reason: HOSPADM

## 2025-03-15 RX ORDER — SODIUM CHLORIDE 9 MG/ML
125 INJECTION, SOLUTION INTRAVENOUS CONTINUOUS
Status: DISCONTINUED | OUTPATIENT
Start: 2025-03-15 | End: 2025-03-15 | Stop reason: HOSPADM

## 2025-03-15 RX ORDER — LORATADINE 10 MG/1
10 TABLET ORAL DAILY
Status: DISCONTINUED | OUTPATIENT
Start: 2025-03-15 | End: 2025-03-15 | Stop reason: HOSPADM

## 2025-03-15 RX ORDER — METHOCARBAMOL 500 MG/1
500 TABLET, FILM COATED ORAL 4 TIMES DAILY
Status: DISCONTINUED | OUTPATIENT
Start: 2025-03-15 | End: 2025-03-15 | Stop reason: HOSPADM

## 2025-03-15 RX ORDER — FINASTERIDE 5 MG/1
5 TABLET, FILM COATED ORAL DAILY
Status: DISCONTINUED | OUTPATIENT
Start: 2025-03-15 | End: 2025-03-15 | Stop reason: HOSPADM

## 2025-03-15 RX ORDER — MAGNESIUM SULFATE HEPTAHYDRATE 40 MG/ML
2 INJECTION, SOLUTION INTRAVENOUS ONCE
Status: COMPLETED | OUTPATIENT
Start: 2025-03-15 | End: 2025-03-15

## 2025-03-15 RX ORDER — LORAZEPAM 2 MG/ML
0.5 INJECTION INTRAMUSCULAR ONCE
Status: COMPLETED | OUTPATIENT
Start: 2025-03-15 | End: 2025-03-15

## 2025-03-15 RX ADMIN — MAGNESIUM SULFATE HEPTAHYDRATE 2 G: 40 INJECTION, SOLUTION INTRAVENOUS at 05:30

## 2025-03-15 RX ADMIN — SODIUM CHLORIDE 500 ML: 0.9 INJECTION, SOLUTION INTRAVENOUS at 05:30

## 2025-03-15 RX ADMIN — METOPROLOL SUCCINATE 75 MG: 25 TABLET, EXTENDED RELEASE ORAL at 10:02

## 2025-03-15 RX ADMIN — SODIUM CHLORIDE 125 ML/HR: 0.9 INJECTION, SOLUTION INTRAVENOUS at 06:21

## 2025-03-15 RX ADMIN — METHOCARBAMOL 500 MG: 500 TABLET ORAL at 10:02

## 2025-03-15 RX ADMIN — APIXABAN 5 MG: 5 TABLET, FILM COATED ORAL at 10:01

## 2025-03-15 RX ADMIN — FINASTERIDE 5 MG: 5 TABLET, FILM COATED ORAL at 10:03

## 2025-03-15 RX ADMIN — METHOCARBAMOL 500 MG: 500 TABLET ORAL at 12:01

## 2025-03-15 RX ADMIN — LORATADINE 10 MG: 10 TABLET ORAL at 10:02

## 2025-03-15 RX ADMIN — LORAZEPAM 0.5 MG: 2 INJECTION INTRAMUSCULAR; INTRAVENOUS at 04:20

## 2025-03-15 NOTE — ASSESSMENT & PLAN NOTE
Recent Labs     03/15/25  0400 03/15/25  0911   CREATININE 1.42* 1.00   EGFR 52 79     Estimated Creatinine Clearance: 86.6 mL/min (by C-G formula based on SCr of 1 mg/dL).  Baseline creatinine 0.8-1.1  Past medical history significant for recently diagnosed prostate cancer, patient pursuing alternative treatment including juice cleanses and fasting  Endorses adequate fluid intake and normal urine output  Patient does appear somewhat dry on physical exam and with low sodium and chloride will suspect he is intravascular depleted  Will trial on IV fluids and monitor BMP  Check UA  Will BladderScan to rule out obstruction in the setting of prostate cancer

## 2025-03-15 NOTE — ASSESSMENT & PLAN NOTE
Recent Labs     03/15/25  0400   SODIUM 126*   Patient has been intermittently fasting and trying juice cleanses as alternate treatment for prostate cancer  Suspect low sodium is due to poor solute intake  Trial on normal saline 100 cc/h  Goal is to raise sodium by 6-8 points in first 24 hours  Trend BMP

## 2025-03-15 NOTE — ED NOTES
Verified with pharmacy that it will be ok to give Robaxin at 12PM as scheduled even though 9am dose was late. OK to administer per pharmacy.     Rosie Jorgensen RN  03/15/25 6086

## 2025-03-15 NOTE — H&P
H&P - Hospitalist   Name: Dexter Reyes 64 y.o. male I MRN: [id number]  Unit/Bed#: ED-28 I Date of Admission: 3/15/2025   Date of Service: 3/15/2025 I Hospital Day: 0     Assessment & Plan  Shortness of breath  Presents with acute onset of shortness of breath and chest tightness starting at approximately 1 AM this morning  Patient admits to cocaine use shortly before hand  Medical history significant for heart failure mildly reduced ejection fraction, atrial fibrillation, ventricular tachycardia status post ICD  Patient noted be tachycardic on arrival to the emergency department, otherwise hemodynamically stable  Initial troponin values within normal limits  EKG shows sinus tachycardia without significant ST segment or T wave changes  Chest x-ray without any obvious consolidation or pulmonary edema on my read  Symptoms have completely resolved at the time of evaluation  Suspect presentation is relation to cocaine use given transient nature of symptoms    Plan  Continue to monitor on telemetry  Repeat EKG  Finish troponin trend  Cocaine cessation  BERNARD (acute kidney injury) (HCC)  Recent Labs     03/15/25  0400   CREATININE 1.42*   EGFR 52     Estimated Creatinine Clearance: 61 mL/min (A) (by C-G formula based on SCr of 1.42 mg/dL (H)).  Baseline creatinine 0.8-1.1  Past medical history significant for recently diagnosed prostate cancer, patient pursuing alternative treatment including juice cleanses and fasting  Endorses adequate fluid intake and normal urine output  Patient does appear somewhat dry on physical exam and with low sodium and chloride will suspect he is intravascular depleted  Will trial on IV fluids and monitor BMP  Check UA  Will BladderScan to rule out obstruction in the setting of prostate cancer  Heart failure with mildly reduced ejection fraction (HCC)  Wt Readings from Last 3 Encounters:   03/15/25 93 kg (205 lb 0.4 oz)   09/09/24 111 kg (244 lb 11.4 oz)   04/13/24 117 kg (258 lb 6.1 oz)    Nonischemic cardiomyopathy status post ICD placement  Ejection fraction 45%  Not in acute exacerbation  GDMT: Losartan 50 mg daily, Toprol 75 mg daily, Aldactone 25 mg daily  Okay to continue Toprol 75 mg daily, hold losartan and Aldactone in setting of BERNARD  Monitor volume status and BMP  Substance abuse (HCC)  Patient admits to cocaine use and vaping marijuana  Admits to relapsing the last 2 months, has a sponsor and support group  Hyponatremia  Recent Labs     03/15/25  0400   SODIUM 126*   Patient has been intermittently fasting and trying juice cleanses as alternate treatment for prostate cancer  Suspect low sodium is due to poor solute intake  Trial on normal saline 100 cc/h  Goal is to raise sodium by 6-8 points in first 24 hours  Trend BMP    Obstructive sleep apnea  Patient tends to have family bring in his own CPAP      VTE Pharmacologic Prophylaxis:   Moderate Risk (Score 3-4) - Pharmacological DVT Prophylaxis Ordered: apixaban (Eliquis).  Code Status: Level 1 full code  Discussion with family: Patient declined call to .     Anticipated Length of Stay: Patient will be admitted on an observation basis with an anticipated length of stay of less than 2 midnights secondary to chest tightness.    History of Present Illness   Chief Complaint: Shortness of breath, chest tightness    Dexter Reyes is a 64 y.o. male with a PMH of nonischemic cardiomyopathy status post ICD placement, atrial fibrillation on metoprolol and apixaban, recent diagnosis of prostate cancer, and substance abuse who presents with shortness of breath and chest tightness.  Patient endorses doing cocaine around 1 AM this morning and shortly after developing shortness of breath and chest tightness.  He is not normally short of breath.  Chest tightness was centrally located and did not radiate to any other part of the body.  He denies any nausea, vomiting or abdominal pain.  Patient endorses recent juice cleanses and fasting as  alternative treatment for his prostate cancer.  He feels that he is getting adequate nutrition and fluid intake.  Patient endorses normal urinary output.  He is not having any dysuria or blood in the urine.  Denies any diarrhea.  At the time of my interview patient says his symptoms are completely resolved and he feels back to normal.    In the emergency department patient noted to be tachycardic but otherwise hemodynamically stable.  Initial laboratory studies showed a sodium of 126 and an elevated creatinine from baseline of 0.8-1.42.  EKG and troponins were unremarkable.  Chest x-ray on my review shows no consolidation or signs of volume overload.  Patient appeared somewhat dry on physical exam.  Patient is to be admitted for further workup and management of his hyponatremia and BERNARD.        Review of Systems   Constitutional:  Negative for chills and fever.   HENT:  Negative for ear pain and sore throat.    Eyes:  Negative for pain and visual disturbance.   Respiratory:  Negative for cough and shortness of breath.    Cardiovascular:  Negative for chest pain and palpitations.   Gastrointestinal:  Negative for abdominal pain and vomiting.   Genitourinary:  Negative for dysuria and hematuria.   Musculoskeletal:  Negative for arthralgias and back pain.   Skin:  Negative for color change and rash.   Neurological:  Negative for seizures and syncope.   All other systems reviewed and are negative.      Historical Information   Past Medical History:   Diagnosis Date    BPH (benign prostatic hyperplasia)     Cardiomyopathy (HCC)     Coronary artery disease 9/1/2021    Diabetes mellitus (HCC)     ICD (implantable cardioverter-defibrillator) in place     Obesity     Prostate cancer (HCC)      Past Surgical History:   Procedure Laterality Date    CHOLECYSTECTOMY      HERNIA REPAIR       Social History     Tobacco Use    Smoking status: Never    Smokeless tobacco: Never   Vaping Use    Vaping status: Some Days    Substances:  THC, CBD   Substance and Sexual Activity    Alcohol use: No    Drug use: Yes     Types: Cocaine, Marijuana    Sexual activity: Yes     Partners: Male     E-Cigarette/Vaping    E-Cigarette Use Current Some Day User      E-Cigarette/Vaping Substances    Nicotine No     THC Yes     CBD Yes     Flavoring No     Other No     Unknown No        Social History:  Marital Status: /Civil Union   Occupation:   Patient Pre-hospital Living Situation: Home  Patient Pre-hospital Level of Mobility: walks  Patient Pre-hospital Diet Restrictions:     Meds/Allergies   I have reviewed home medications using recent Epic encounter.  Prior to Admission medications    Medication Sig Start Date End Date Taking? Authorizing Provider   alfuzosin (UROXATRAL) 10 mg 24 hr tablet Take 10 mg by mouth daily    Historical Provider, MD   AMLODIPINE BESYLATE PO     Historical Provider, MD   apixaban (ELIQUIS) 5 mg Take 5 mg by mouth 2 (two) times a day    Historical Provider, MD   aspirin (ECOTRIN LOW STRENGTH) 81 mg EC tablet Take 81 mg by mouth  Patient not taking: Reported on 4/28/2023    Historical Provider, MD   cetirizine (ZyrTEC) 10 mg tablet Take 1 tablet by mouth    Historical Provider, MD   dextromethorphan-guaiFENesin (ROBITUSSIN DM)  mg/5 mL syrup Take 5 mL by mouth 3 (three) times a day as needed for cough 11/30/22   LOIR Ortiz   dutasteride (AVODART) 0.5 mg capsule Take 1 capsule by mouth daily 6/24/14   Historical Provider, MD   Empagliflozin 25 MG TABS Take 25 mg by mouth    Historical Provider, MD   furosemide (LASIX) 20 mg tablet Take 2 tablets by mouth    Historical Provider, MD   lidocaine (Lidoderm) 5 % Apply 1 patch topically over 12 hours daily for 5 days Remove & Discard patch within 12 hours or as directed by MD 9/9/24 9/14/24  Phoebe Sykes PA-C   losartan (COZAAR) 50 mg tablet Take 50 mg by mouth    Historical Provider, MD   metFORMIN (GLUCOPHAGE) 500 mg tablet Take 1,000 mg by mouth 2 (two) times a day  with meals     Historical Provider, MD   methocarbamol (ROBAXIN) 500 mg tablet Take 1 tablet (500 mg total) by mouth 4 (four) times a day 9/9/24   Phoebe Sykes PA-C   metoprolol succinate (TOPROL-XL) 50 mg 24 hr tablet Take 75 mg by mouth daily    Historical Provider, MD   rosuvastatin (CRESTOR) 10 MG tablet Take 10 mg by mouth daily  Patient not taking: Reported on 11/9/2022    Historical Provider, MD   spironolactone (ALDACTONE) 25 mg tablet Take 25 mg by mouth daily    Historical Provider, MD     Allergies   Allergen Reactions    Liraglutide Shortness Of Breath    Pollen Extract Shortness Of Breath       Objective :  Temp:  [98.7 °F (37.1 °C)] 98.7 °F (37.1 °C)  HR:  [] 98  BP: (119-138)/(80-87) 119/81  Resp:  [17-20] 20  SpO2:  [95 %-99 %] 99 %  O2 Device: None (Room air)    Physical Exam  Vitals and nursing note reviewed.   Constitutional:       General: He is not in acute distress.     Appearance: He is well-developed.   HENT:      Head: Normocephalic and atraumatic.   Eyes:      Conjunctiva/sclera: Conjunctivae normal.   Cardiovascular:      Rate and Rhythm: Normal rate and regular rhythm.      Heart sounds: No murmur heard.  Pulmonary:      Effort: Pulmonary effort is normal. No respiratory distress.      Breath sounds: Normal breath sounds.   Abdominal:      Palpations: Abdomen is soft.      Tenderness: There is no abdominal tenderness.   Musculoskeletal:         General: No swelling.      Cervical back: Neck supple.   Skin:     General: Skin is warm and dry.      Capillary Refill: Capillary refill takes less than 2 seconds.   Neurological:      Mental Status: He is alert.   Psychiatric:         Mood and Affect: Mood normal.          Lines/Drains:            Lab Results: I have reviewed the following results:  Results from last 7 days   Lab Units 03/15/25  0400   WBC Thousand/uL 8.70   HEMOGLOBIN g/dL 15.2   HEMATOCRIT % 41.0   PLATELETS Thousands/uL 306   SEGS PCT % 83*   LYMPHO PCT % 9*   MONO  PCT % 7   EOS PCT % 0     Results from last 7 days   Lab Units 03/15/25  0400   SODIUM mmol/L 126*   POTASSIUM mmol/L 3.8   CHLORIDE mmol/L 95*   CO2 mmol/L 19*   BUN mg/dL 14   CREATININE mg/dL 1.42*   ANION GAP mmol/L 12   CALCIUM mg/dL 9.3   ALBUMIN g/dL 4.4   TOTAL BILIRUBIN mg/dL 0.75   ALK PHOS U/L 48   ALT U/L 19   AST U/L 24   GLUCOSE RANDOM mg/dL 147*             Lab Results   Component Value Date    HGBA1C 6.1 (H) 09/08/2024                 Administrative Statements       ** Please Note: This note has been constructed using a voice recognition system. **

## 2025-03-15 NOTE — ASSESSMENT & PLAN NOTE
Presents with acute onset of shortness of breath and chest tightness starting at approximately 1 AM this morning  Patient admits to cocaine use shortly before hand  Medical history significant for heart failure mildly reduced ejection fraction, atrial fibrillation, ventricular tachycardia status post ICD  Patient noted be tachycardic on arrival to the emergency department, otherwise hemodynamically stable  Initial troponin values within normal limits  EKG shows sinus tachycardia without significant ST segment or T wave changes  Chest x-ray without any obvious consolidation or pulmonary edema on my read  Symptoms have completely resolved at the time of evaluation  Suspect presentation is relation to cocaine use given transient nature of symptoms    Plan  Continue to monitor on telemetry  Repeat EKG  Finish troponin trend  Cocaine cessation

## 2025-03-15 NOTE — ASSESSMENT & PLAN NOTE
Recent Labs     03/15/25  0400 03/15/25  0911   SODIUM 126* 128*   Patient has been intermittently fasting and trying juice cleanses as alternate treatment for prostate cancer  Suspect low sodium is due to poor solute intake  Trial on normal saline 100 cc/h  Goal is to raise sodium by 6-8 points in first 24 hours  Trend BMP

## 2025-03-15 NOTE — ED PROVIDER NOTES
"Time reflects when diagnosis was documented in both MDM as applicable and the Disposition within this note       Time User Action Codes Description Comment    3/15/2025  5:33 AM Marley Horan Add [R06.02] SOB (shortness of breath)     3/15/2025  5:33 AM Marley Horanetta Add [R07.9] Chest pain     3/15/2025  5:33 AM Marley Horan Add [E87.1] Hyponatremia     3/15/2025  5:33 AM AungMarleyetta Add [R25.2] Cramping of hands     3/15/2025  5:33 AM AungMarleyetta Add [R25.2] Cramping of feet     3/15/2025  5:33 AM Marley Horan Modify [R06.02] SOB (shortness of breath)     3/15/2025  5:33 AM Aung Marley Savageetta Modify [E87.1] Hyponatremia     3/15/2025  5:34 AM AungMarley Add [E83.42] Hypomagnesemia     3/15/2025  5:35 AM AungMarley Add [N17.9] BERNARD (acute kidney injury) (HCC)           ED Disposition       ED Disposition   Admit    Condition   Stable    Date/Time   Sat Mar 15, 2025  5:40 AM    Comment   Case was discussed with [facility] resident team and the patient's admission status was agreed to be Admission Status: inpatient status to the service of Dr. Gan .               Assessment & Plan       Medical Decision Making  64-year-old male with history of cardiomyopathy s/p ICD (EF 45% on echo from September 2024), diabetes, BPH, CAD, prostate cancer presents to the emergency department for evaluation of cramping in his hands and feet that started a couple of hours ago after snorting half a gram of cocaine.  States he does not use regularly but has used twice in the last month.  Denies any alcohol use.  States he has been drinking \"a lot \"of water lately.  States he is using the Kirby [place] method with ivermectin to help treat his prostate cancer, states he was able to get his PSA down from 5 --> 3.11 in one month by following this protocol. His urine is green because he has been taking methylene blue.     Here in the emergency department, patient is hemodynamically stable and " afebrile, tachycardic to the 110s, sinus, with normal work of breathing satting 99% on room air.  He is alert and oriented x 3, not in any acute distress, nontoxic-appearing.  His physical exam is unremarkable.  Neuroexam is nonfocal.    Differential diagnosis includes but is not limited to: ACS, electrolyte abnormality, metabolic disturbance, myositis, kidney dysfunction    Laboratory workup here demonstrates an acute hyponatremia 126, suspect this may be due to significant water intake, however will need urine studies to confirm.  Labs also notable for an BERNARD, hypomagnesemia to 1.6.  Initial troponin is 28, with a very slight ST elevation in V2.  Will continue to trend troponins, will admit to medicine for continued workup for acute hyponatremia as well as cardiac monitoring.  Discussed this plan with the patient as he is amenable.    Amount and/or Complexity of Data Reviewed  Labs: ordered. Decision-making details documented in ED Course.  Radiology: ordered and independent interpretation performed.    Risk  Prescription drug management.  Decision regarding hospitalization.        ED Course as of 03/15/25 0628   Sat Mar 15, 2025   0534 Total CK(!): 365   0535 MAGNESIUM(!): 1.6   0535 BNP(!): 141   0535 hs TnI 0hr: 28   0535 Creatinine(!): 1.42       Medications   sodium chloride 0.9 % infusion (125 mL/hr Intravenous New Bag 3/15/25 0621)   magnesium sulfate 2 g/50 mL IVPB (premix) 2 g (2 g Intravenous New Bag 3/15/25 0530)   LORazepam (ATIVAN) injection 0.5 mg (0.5 mg Intravenous Given 3/15/25 0420)   sodium chloride 0.9 % bolus 500 mL (0 mL Intravenous Stopped 3/15/25 0606)       ED Risk Strat Scores   HEART Risk Score      Flowsheet Row Most Recent Value   Heart Score Risk Calculator    History 1 Filed at: 03/15/2025 0608   ECG 1 Filed at: 03/15/2025 0608   Age 1 Filed at: 03/15/2025 0608   Risk Factors 2 Filed at: 03/15/2025 0608   Troponin 1 Filed at: 03/15/2025 0608   HEART Score 6 Filed at: 03/15/2025 0608  "         HEART Risk Score      Flowsheet Row Most Recent Value   Heart Score Risk Calculator    History 1 Filed at: 03/15/2025 0608   ECG 1 Filed at: 03/15/2025 0608   Age 1 Filed at: 03/15/2025 0608   Risk Factors 2 Filed at: 03/15/2025 0608   Troponin 1 Filed at: 03/15/2025 0608   HEART Score 6 Filed at: 03/15/2025 0608                     Clinical Opiate Withdrawal Scale       Row Name 03/15/25 0615                Pulse 100  -RM        Resting Pulse Rate: Measured After Patient is Sitting or Lying for One Minute 1  -RM        GI Upset: Over Last Half Hour 0  -RM        Sweating: Over Past Half Hour Not Accounted for by Room Temperature of Patient Activity 0  -RM        Tremor: Observation of Outstretched Hands 0  -RM        Restlessness: Observation During Assessment 0  -RM        Yawning: Observation During Assessment 0  -RM        Pupil Size 2  -RM        Anxiety and Irritability 0  -RM        Bone or Joint Aches: If Patient was Having Pain Previously, Only the Additional Component Attributed to Opiate Withdrawal is Scored 0  -RM        Gooseflesh Skin 0  -RM        Runny Nose or Tearing: Not Accounted for by Cold Symptoms or Allergies 0  -RM        Clinical Opiate Withdrawal Scale Total Score 3  -RM        Heart Rate Source --        Patient Position - Orthostatic VS --                  User Key  (r) = Recorded By, (t) = Taken By, (c) = Cosigned By      Initials Name    RM Alexia Solitario RN                                              History of Present Illness       Chief Complaint   Patient presents with    Numbness     C/o B/L hand and feet numbness x 1 hr. Pt admitted he snorted Cocaine approx 1hr PTA and performed a \"home EKG on my phone and it said my heart was in fibrillation\".  Pt denies CP. +SOB.   Hx 20%EF, had defib       Past Medical History:   Diagnosis Date    BPH (benign prostatic hyperplasia)     Cardiomyopathy (HCC)     Coronary artery disease 9/1/2021    Diabetes mellitus (HCC)     ICD " "(implantable cardioverter-defibrillator) in place     Obesity     Prostate cancer (HCC)       Past Surgical History:   Procedure Laterality Date    CHOLECYSTECTOMY      HERNIA REPAIR        History reviewed. No pertinent family history.   Social History     Tobacco Use    Smoking status: Never    Smokeless tobacco: Never   Vaping Use    Vaping status: Some Days    Substances: THC, CBD   Substance Use Topics    Alcohol use: No    Drug use: Yes     Types: Cocaine, Marijuana      E-Cigarette/Vaping    E-Cigarette Use Current Some Day User       E-Cigarette/Vaping Substances    Nicotine No     THC Yes     CBD Yes     Flavoring No     Other No     Unknown No       I have reviewed and agree with the history as documented.     64-year-old male with history of cardiomyopathy s/p ICD (EF 45% on echo from September 2024), diabetes, BPH, CAD, prostate cancer presents to the emergency department for evaluation of cramping in his hands and feet that started a couple of hours ago after snorting half a gram of cocaine.  States he does not use regularly but has used twice in the last month.  Denies any alcohol use.  States he has been drinking \"a lot \"of water lately.  States he is using the [place] method with ivermectin to help treat his prostate cancer, states he was able to get his PSA down from 5 --> 3.11 in one month by following this protocol. His urine is green because he has been taking methylene blue.           Review of Systems   Constitutional:  Negative for chills and fever.   HENT:  Negative for ear pain and sore throat.    Eyes:  Negative for pain and visual disturbance.   Respiratory:  Positive for chest tightness and shortness of breath. Negative for cough.    Cardiovascular:  Negative for chest pain and leg swelling.   Gastrointestinal:  Negative for abdominal pain, blood in stool, constipation, diarrhea, nausea and vomiting.   Genitourinary:  Negative for dysuria and hematuria.   Musculoskeletal:  Negative " for neck pain and neck stiffness.        Cramping in hands and feet   Neurological:  Negative for dizziness, syncope, weakness and light-headedness.   All other systems reviewed and are negative.          Objective       ED Triage Vitals [03/15/25 0330]   Temperature Pulse Blood Pressure Respirations SpO2 Patient Position - Orthostatic VS   98.7 °F (37.1 °C) (!) 112 138/87 17 97 % Lying      Temp Source Heart Rate Source BP Location FiO2 (%) Pain Score    Oral Monitor Left arm -- No Pain      Vitals      Date and Time Temp Pulse SpO2 Resp BP Pain Score FACES Pain Rating User   03/15/25 0615 -- 100 -- -- -- -- -- RM   03/15/25 0545 -- 98 99 % 20 119/81 -- -- YU   03/15/25 0430 -- 101 95 % 18 123/80 -- -- DB   03/15/25 0330 98.7 °F (37.1 °C) 112 97 % 17 138/87 No Pain -- YU            Physical Exam  Vitals and nursing note reviewed.   Constitutional:       General: He is not in acute distress.     Appearance: He is well-developed. He is not ill-appearing, toxic-appearing or diaphoretic.   HENT:      Head: Normocephalic and atraumatic.   Eyes:      Extraocular Movements: Extraocular movements intact.      Conjunctiva/sclera: Conjunctivae normal.   Cardiovascular:      Rate and Rhythm: Regular rhythm. Tachycardia present.      Pulses: Normal pulses.      Heart sounds: Normal heart sounds. No murmur heard.  Pulmonary:      Effort: Pulmonary effort is normal. No respiratory distress.      Breath sounds: Normal breath sounds. No stridor. No wheezing, rhonchi or rales.   Abdominal:      Palpations: Abdomen is soft.      Tenderness: There is no abdominal tenderness. There is no guarding or rebound.   Musculoskeletal:         General: No swelling or tenderness. Normal range of motion.      Cervical back: Normal range of motion and neck supple.      Right lower leg: No edema.      Left lower leg: No edema.   Skin:     General: Skin is warm and dry.      Capillary Refill: Capillary refill takes less than 2 seconds.       Coloration: Skin is not jaundiced.      Findings: No rash.   Neurological:      General: No focal deficit present.      Mental Status: He is alert and oriented to person, place, and time.   Psychiatric:         Mood and Affect: Mood normal.         Results Reviewed       Procedure Component Value Units Date/Time    HS Troponin I 2hr [[id number]] Collected: 03/15/25 0621    Lab Status: No result Specimen: Blood from Arm, Right     UA (URINE) with reflex to Scope [[id number]] Collected: 03/15/25 0616    Lab Status: No result Specimen: Urine, Clean Catch     B-Type Natriuretic Peptide(BNP) [[id number]]  (Abnormal) Collected: 03/15/25 0400    Lab Status: Final result Specimen: Blood from Arm, Right Updated: 03/15/25 0509      pg/mL     HS Troponin I 4hr [[id number]]     Lab Status: No result Specimen: Blood     HS Troponin 0hr (reflex protocol) [[id number]]  (Normal) Collected: 03/15/25 0400    Lab Status: Final result Specimen: Blood from Arm, Right Updated: 03/15/25 0457     hs TnI 0hr 28 ng/L     Hepatic function panel [[id number]]  (Normal) Collected: 03/15/25 0400    Lab Status: Final result Specimen: Blood from Arm, Right Updated: 03/15/25 0450     Total Bilirubin 0.75 mg/dL      Bilirubin, Direct 0.18 mg/dL      Alkaline Phosphatase 48 U/L      AST 24 U/L      ALT 19 U/L      Total Protein 6.8 g/dL      Albumin 4.4 g/dL     CK [[id number]]  (Abnormal) Collected: 03/15/25 0400    Lab Status: Final result Specimen: Blood from Arm, Right Updated: 03/15/25 0450     Total  U/L     Lipase [[id number]]  (Normal) Collected: 03/15/25 0400    Lab Status: Final result Specimen: Blood from Arm, Right Updated: 03/15/25 0450     Lipase 15 u/L     Basic metabolic panel [[id number]]  (Abnormal) Collected: 03/15/25 0400    Lab Status: Final result Specimen: Blood from Arm, Right Updated: 03/15/25 0449     Sodium 126 mmol/L      Potassium 3.8 mmol/L      Chloride 95 mmol/L      CO2 19 mmol/L      ANION GAP 12 mmol/L      BUN  14 mg/dL      Creatinine 1.42 mg/dL      Glucose 147 mg/dL      Calcium 9.3 mg/dL      eGFR 52 ml/min/1.73sq m     Narrative:      National Kid[employer] guidelines for Chronic Kidney Disease (CKD):     Stage 1 with normal or high GFR (GFR > 90 mL/min/1.73 square meters)    Stage 2 Mild CKD (GFR = 60-89 mL/min/1.73 square meters)    Stage 3A Moderate CKD (GFR = 45-59 mL/min/1.73 square meters)    Stage 3B Moderate CKD (GFR = 30-44 mL/min/1.73 square meters)    Stage 4 Severe CKD (GFR = 15-29 mL/min/1.73 square meters)    Stage 5 End Stage CKD (GFR <15 mL/min/1.73 square meters)  Note: GFR calculation is accurate only with a steady state creatinine    Magnesium [[id number]]  (Abnormal) Collected: 03/15/25 0400    Lab Status: Final result Specimen: Blood from Arm, Right Updated: 03/15/25 0449     Magnesium 1.6 mg/dL     CBC and differential [[id number]]  (Abnormal) Collected: 03/15/25 0400    Lab Status: Final result Specimen: Blood from Arm, Right Updated: 03/15/25 0441     WBC 8.70 Thousand/uL      RBC 4.82 Million/uL      Hemoglobin 15.2 g/dL      Hematocrit 41.0 %      MCV 85 fL      MCH 31.5 pg      MCHC 37.1 g/dL      RDW 11.8 %      MPV 8.7 fL      Platelets 306 Thousands/uL      nRBC 0 /100 WBCs      Segmented % 83 %      Immature Grans % 0 %      Lymphocytes % 9 %      Monocytes % 7 %      Eosinophils Relative 0 %      Basophils Relative 1 %      Absolute Neutrophils 7.22 Thousands/µL      Absolute Immature Grans 0.03 Thousand/uL      Absolute Lymphocytes 0.81 Thousands/µL      Absolute Monocytes 0.59 Thousand/µL      Eosinophils Absolute 0.00 Thousand/µL      Basophils Absolute 0.05 Thousands/µL             XR chest 1 view portable   ED Interpretation by Marley Horan MD (03/15 0628)   No evidence of acute cardiopulmonary process.  Left sided pacemaker with leads in appropriate anatomic position.  Interpreted by me.          ECG 12 Lead Documentation Only    Date/Time: 3/15/2025 4:18  AM    Performed by: Marley Horan MD  Authorized by: Marley Horan MD    Indications / Diagnosis:  Shortness of breath  ECG reviewed by me, the ED Provider: yes    Patient location:  ED  Previous ECG:     Previous ECG:  Compared to current  Rate:     ECG rate:  114    ECG rate assessment: tachycardic    Rhythm:     Rhythm: sinus tachycardia    Ectopy:     Ectopy: none    QRS:     QRS axis:  Normal    QRS intervals:  Normal  Conduction:     Conduction: normal    ST segments:     ST segments:  Abnormal    Elevation:  V2    Depression:  V5  Comments:      QTc 452      ED Medication and Procedure Management   Prior to Admission Medications   Prescriptions Last Dose Informant Patient Reported? Taking?   AMLODIPINE BESYLATE PO   Yes No   Empagliflozin 25 MG TABS  Self Yes No   Sig: Take 25 mg by mouth   alfuzosin (UROXATRAL) 10 mg 24 hr tablet  Self Yes No   Sig: Take 10 mg by mouth daily   apixaban (ELIQUIS) 5 mg   Yes No   Sig: Take 5 mg by mouth 2 (two) times a day   aspirin (ECOTRIN LOW STRENGTH) 81 mg EC tablet   Yes No   Sig: Take 81 mg by mouth   Patient not taking: Reported on 4/28/2023   cetirizine (ZyrTEC) 10 mg tablet   Yes No   Sig: Take 1 tablet by mouth   dextromethorphan-guaiFENesin (ROBITUSSIN DM)  mg/5 mL syrup   No No   Sig: Take 5 mL by mouth 3 (three) times a day as needed for cough   dutasteride (AVODART) 0.5 mg capsule  Self Yes No   Sig: Take 1 capsule by mouth daily   furosemide (LASIX) 20 mg tablet   Yes No   Sig: Take 2 tablets by mouth   lidocaine (Lidoderm) 5 %   No No   Sig: Apply 1 patch topically over 12 hours daily for 5 days Remove & Discard patch within 12 hours or as directed by MD   losartan (COZAAR) 50 mg tablet   Yes No   Sig: Take 50 mg by mouth   metFORMIN (GLUCOPHAGE) 500 mg tablet  Self Yes No   Sig: Take 1,000 mg by mouth 2 (two) times a day with meals    methocarbamol (ROBAXIN) 500 mg tablet   No No   Sig: Take 1 tablet (500 mg total) by mouth 4 (four) times a  day   metoprolol succinate (TOPROL-XL) 50 mg 24 hr tablet   Yes No   Sig: Take 75 mg by mouth daily   rosuvastatin (CRESTOR) 10 MG tablet   Yes No   Sig: Take 10 mg by mouth daily   Patient not taking: Reported on 11/9/2022   spironolactone (ALDACTONE) 25 mg tablet   Yes No   Sig: Take 25 mg by mouth daily      Facility-Administered Medications: None     Patient's Medications   Discharge Prescriptions    No medications on file     No discharge procedures on file.  ED SEPSIS DOCUMENTATION   Time reflects when diagnosis was documented in both MDM as applicable and the Disposition within this note       Time User Action Codes Description Comment    3/15/2025  5:33 AM Marley Horan Add [R06.02] SOB (shortness of breath)     3/15/2025  5:33 AM Marley Horan Add [R07.9] Chest pain     3/15/2025  5:33 AM Marley Horan Add [E87.1] Hyponatremia     3/15/2025  5:33 AM Marley Horan Add [R25.2] Cramping of hands     3/15/2025  5:33 AM Marley Horan Add [R25.2] Cramping of feet     3/15/2025  5:33 AM Marley Horan Modify [R06.02] SOB (shortness of breath)     3/15/2025  5:33 AM Marley Horan Modify [E87.1] Hyponatremia     3/15/2025  5:34 AM Marley Horan Add [E83.42] Hypomagnesemia     3/15/2025  5:35 AM Marley Horan Add [N17.9] BERNARD (acute kidney injury) (HCC)                  Marley Horan MD  03/15/25 0622       Marley Horan MD  03/15/25 0628

## 2025-03-15 NOTE — ED NOTES
Patient reports that he takes a supplement called methylene blue, last taken yesterday.      Rosie Jorgensen RN  03/15/25 0759

## 2025-03-15 NOTE — DISCHARGE SUMMARY
Discharge Summary - Hospitalist   Name: Dexter Reyes 64 y.o. male I MRN: [id number]  Unit/Bed#: ED-28 I Date of Admission: 3/15/2025   Date of Service: 3/15/2025 I Hospital Day: 1     Assessment & Plan  Shortness of breath  Presents with acute onset of shortness of breath and chest tightness starting at approximately 1 AM this morning  Patient admits to cocaine use shortly before hand  Medical history significant for heart failure mildly reduced ejection fraction, atrial fibrillation, ventricular tachycardia status post ICD  Patient noted be tachycardic on arrival to the emergency department, otherwise hemodynamically stable  Initial troponin values within normal limits  EKG shows sinus tachycardia without significant ST segment or T wave changes  Chest x-ray without any obvious consolidation or pulmonary edema on my read  Symptoms have completely resolved at the time of evaluation  Suspect presentation is relation to cocaine use given transient nature of symptoms    Plan  Continue to monitor on telemetry  Repeat EKG  Finish troponin trend  Cocaine cessation  BERNARD (acute kidney injury) (HCC)  Recent Labs     03/15/25  0400 03/15/25  0911   CREATININE 1.42* 1.00   EGFR 52 79     Estimated Creatinine Clearance: 86.6 mL/min (by C-G formula based on SCr of 1 mg/dL).  Baseline creatinine 0.8-1.1  Past medical history significant for recently diagnosed prostate cancer, patient pursuing alternative treatment including juice cleanses and fasting  Endorses adequate fluid intake and normal urine output  Patient does appear somewhat dry on physical exam and with low sodium and chloride will suspect he is intravascular depleted  Will trial on IV fluids and monitor BMP  Check UA  Will BladderScan to rule out obstruction in the setting of prostate cancer  Heart failure with mildly reduced ejection fraction (HCC)  Wt Readings from Last 3 Encounters:   03/15/25 93 kg (205 lb 0.4 oz)   09/09/24 111 kg (244 lb 11.4 oz)   04/13/24  117 kg (258 lb 6.1 oz)   Nonischemic cardiomyopathy status post ICD placement  Ejection fraction 45%  Not in acute exacerbation  GDMT: Losartan 50 mg daily, Toprol 75 mg daily, Aldactone 25 mg daily  Okay to continue Toprol 75 mg daily, hold losartan and Aldactone in setting of BERNARD  Monitor volume status and BMP  Substance abuse (HCC)  Patient admits to cocaine use and vaping marijuana  Admits to relapsing the last 2 months, has a sponsor and support group  Hyponatremia  Recent Labs     03/15/25  0400 03/15/25  0911   SODIUM 126* 128*   Patient has been intermittently fasting and trying juice cleanses as alternate treatment for prostate cancer  Suspect low sodium is due to poor solute intake  Trial on normal saline 100 cc/h  Goal is to raise sodium by 6-8 points in first 24 hours  Trend BMP    Obstructive sleep apnea  Patient tends to have family bring in his own CPAP     Medical Problems       Resolved Problems  Date Reviewed: 3/15/2025   None       Discharging Physician / Practitioner: Josselin Jensen MD  PCP: No primary care provider on file.  Admission Date:   Admission Orders (From admission, onward)       Ordered        03/15/25 0540  INPATIENT ADMISSION  Once                          Discharge Date: 03/15/25    Consultations During Hospital Stay:  nil    Procedures Performed:   nil    Significant Findings / Test Results:   nil    Incidental Findings:   nil    Test Results Pending at Discharge (will require follow up):   nil     Outpatient Tests Requested:  nil    Complications:  nil    Reason for Admission: Chest pain and shortness of breath    Hospital Course:   Dexter Reyes is a 64 y.o. male patient who originally presented to the hospital on 3/15/2025 due to chest pain and shortness of breath with started middle of the night.  Patient reported use cocaine before this episode.  Patient was admitted with mildly elevated troponin, however EKG showing no acute ST-T changes.  Has a history of  "nonischemic cardiomyopathy with reduced EF  history of atrial fibrillation and ventricular tachycardia with ICD in place.  Chest x-ray shows no acute infiltrate.  Patient was received IV fluid hydration for hyponatremia and BERNARD.  Patient admits to drink a lot of repeat sodium level 120.  Noted to have urinary retention of 700s however refused straight cath.  Patient has been recently diagnosed with prostate cancer and seen oncologist but has been pursuing holistic/alternate treatment and nutritional supplements with methylene blue.  Patient wants to go home and wants to sign AGAINST MEDICAL ADVICE.  Patient also declined to update his wife.  Patient strongly advised about  recreational drug cessation and drug rehab.        Please see above list of diagnoses and related plan for additional information.     Condition at Discharge: good    Discharge Day Visit / Exam:   Subjective: Patient feels better.  Denies of any chest pain or shortness of breath.  Vitals: Blood Pressure: 111/74 (03/15/25 1004)  Pulse: 87 (03/15/25 1004)  Temperature: 98.7 °F (37.1 °C) (03/15/25 0330)  Temp Source: Oral (03/15/25 0330)  Respirations: 20 (03/15/25 0545)  Height: 5' 10\" (177.8 cm) (03/15/25 0330)  Weight - Scale: 93 kg (205 lb 0.4 oz) (03/15/25 0330)  SpO2: 97 % (03/15/25 1004)  Physical Exam   HEENT-PERRLA, moist oral mucosa  Neck-supple, no JVD elevation   Respiratory-equal air entry bilaterally, no rales or rhonchi  Cardiovascular system-S1, S2 heard, no murmur or gallops or rubs  Abdomen-soft, nontender, no guarding or rigidity, bowel sounds heard  Extremities-no pedal edema  Peripheral pulses palpable  Musculoskeletal-no contractures  Central nervous system-no acute focal neurological deficit ,no sensory or motor deficit noted.  Skin-no rash noted       Discussion with Family: Patient declined call to .     Discharge instructions/Information to patient and family:   See after visit summary for information provided " to patient and family.      Provisions for Follow-Up Care:  See after visit summary for information related to follow-up care and any pertinent home health orders.      Mobility at time of Discharge:   Basic Mobility Inpatient Raw Score: 24  JH-HLM Goal: 8: Walk 250 feet or more  JH-HLM Achieved: 8: Walk 250 feet ot more  HLM Goal achieved. Continue to encourage appropriate mobility.     Disposition:   Home    Planned Readmission: nil    Discharge Medications:  See after visit summary for reconciled discharge medications provided to patient and/or family.      Administrative Statements   Discharge Statement:      **Please Note: This note may have been constructed using a voice recognition system**

## 2025-03-15 NOTE — ED ATTENDING ATTESTATION
3/15/2025  I, Irwin Molina MD, saw and evaluated the patient. I have discussed the patient with the resident/non-physician practitioner and agree with the resident's/non-physician practitioner's findings, Plan of Care, and MDM as documented in the resident's/non-physician practitioner's note, except where noted. All available labs and Radiology studies were reviewed.  I was present for key portions of any procedure(s) performed by the resident/non-physician practitioner and I was immediately available to provide assistance.       At this point I agree with the current assessment done in the Emergency Department.  I have conducted an independent evaluation of this patient a history and physical is as follows: Patient is a 64 year old male who used cocaine tonight. (+) chest tightness and sob and crampy muscular spasms of hands and feet. No numbness. No fever. No cough. Patient is on eliquis. Was last seen at [facility] on 3/10/25 for prostate cancer. [employer] website checked on this patient and last Rx filled was on 10/31/24 for oxycodone for 2 day supply. NCAT. No scleral icterus. Moist mucous membranes. Lungs clear. Tachycardia without murmur. Nontender chest wall.  Abdomen soft and nontender. Good bowel sounds. No edema. No rash noted. DDX including but not limited to: ACS, MI, doubt PE since patient is on eliquis; PTX, pneumonia, doubt dissection; pleurisy, pericarditis, myocarditis, CHF, metabolic abnormality, dehydration, renal failure, rhabdomyolysis, GI etiology, substance abuse. I reviewed EKG. Will check labs and CXR and give IV ativan.     ED Course         Critical Care Time  Procedures

## 2025-03-15 NOTE — ASSESSMENT & PLAN NOTE
Patient admits to cocaine use and vaping marijuana  Admits to relapsing the last 2 months, has a sponsor and support group

## 2025-03-15 NOTE — ASSESSMENT & PLAN NOTE
Wt Readings from Last 3 Encounters:   03/15/25 93 kg (205 lb 0.4 oz)   09/09/24 111 kg (244 lb 11.4 oz)   04/13/24 117 kg (258 lb 6.1 oz)   Nonischemic cardiomyopathy status post ICD placement  Ejection fraction 45%  Not in acute exacerbation  GDMT: Losartan 50 mg daily, Toprol 75 mg daily, Aldactone 25 mg daily  Okay to continue Toprol 75 mg daily, hold losartan and Aldactone in setting of BERNARD  Monitor volume status and BMP

## 2025-03-15 NOTE — ASSESSMENT & PLAN NOTE
Recent Labs     03/15/25  0400   CREATININE 1.42*   EGFR 52     Estimated Creatinine Clearance: 61 mL/min (A) (by C-G formula based on SCr of 1.42 mg/dL (H)).  Baseline creatinine 0.8-1.1  Past medical history significant for recently diagnosed prostate cancer, patient pursuing alternative treatment including juice cleanses and fasting  Endorses adequate fluid intake and normal urine output  Patient does appear somewhat dry on physical exam and with low sodium and chloride will suspect he is intravascular depleted  Will trial on IV fluids and monitor BMP  Check UA  Will BladderScan to rule out obstruction in the setting of prostate cancer

## 2025-03-15 NOTE — ED NOTES
Patient refusing straight cath at this time, states he takes lasix and sprionolactone and wants those meds given. Provider notified via secure chat.     Rosie Jorgensen RN  03/15/25 [id number]

## 2025-03-16 LAB
ATRIAL RATE: 114 BPM
ATRIAL RATE: 94 BPM
ATRIAL RATE: 96 BPM
P AXIS: 45 DEGREES
P AXIS: 60 DEGREES
PR INTERVAL: 180 MS
PR INTERVAL: 184 MS
PR INTERVAL: 204 MS
QRS AXIS: 115 DEGREES
QRS AXIS: 12 DEGREES
QRS AXIS: 165 DEGREES
QRSD INTERVAL: 76 MS
QRSD INTERVAL: 92 MS
QRSD INTERVAL: 98 MS
QT INTERVAL: 328 MS
QT INTERVAL: 362 MS
QT INTERVAL: 388 MS
QTC INTERVAL: 452 MS
QTC INTERVAL: 452 MS
QTC INTERVAL: 490 MS
T WAVE AXIS: 178 DEGREES
T WAVE AXIS: 19 DEGREES
T WAVE AXIS: 3 DEGREES
VENTRICULAR RATE: 114 BPM
VENTRICULAR RATE: 94 BPM
VENTRICULAR RATE: 96 BPM

## 2025-03-16 PROCEDURE — 93010 ELECTROCARDIOGRAM REPORT: CPT | Performed by: INTERNAL MEDICINE

## 2025-03-16 NOTE — UTILIZATION REVIEW
"Initial Clinical Review    Admission: Date/Time/Statement:   Admission Orders (From admission, onward)       Ordered        03/15/25 0540  INPATIENT ADMISSION  Once                          Orders Placed This Encounter   Procedures    INPATIENT ADMISSION     Standing Status:   Standing     Number of Occurrences:   1     Level of Care:   Med Surg [16]     Estimated length of stay:   More than 2 Midnights     Certification:   I certify that inpatient services are medically necessary for this patient for a duration of greater than two midnights. See H&P and MD Progress Notes for additional information about the patient's course of treatment.     ED Arrival Information       Expected   -    Arrival   3/15/2025 03:16    Acuity   Emergent              Means of arrival   Walk-In    Escorted by   Self    Service   Hospitalist    Admission type   Emergency              Arrival complaint   Numbness and tingling in hands             Chief Complaint   Patient presents with    Numbness     C/o B/L hand and feet numbness x 1 hr. Pt admitted he snorted Cocaine approx 1hr PTA and performed a \"home EKG on my phone and it said my heart was in fibrillation\".  Pt denies CP. +SOB.   Hx 20%EF, had defib       Initial Presentation: 64 y.o. male  to ED via walk in from home.    Admitted to inpatient with Dx: Chest pain/shortness of breath-likely secondary to cocaine use/BERNARD/Hyponatremia.  Presented to ED with cramping in hands and feet starting about 2 hours prior to arrival after snorting a half gram.   Has used twice in last month. Intermittently fasting and trying juice cleanses as alternate treatment for prostate cancer.    PMHx: cardiomyopathy s/p ICD (EF 45% on echo from September 2024), diabetes, BPH, CAD, prostate cancer.  On exam: Tachycardia.  Appears dry.   .   .  Na 126.  Bun 14.  Creatinine 1.42 with baseline of 0.8 - 1.1..  Mg 1.6.  Urine drug screen was positive for cocaine.   Imaging shows no acute findings on " CxR.  ED treatment: ativan 0.5 IV< IVF bolus.  Given Mg and IVF continued.    Plan includes: telemetry.  Trend troponin.  Cocaine cessation.  Continue IVF and trend BMP. Goal is to raise sodium by 6-8 points in first 24 hours  Check UA and bladder scan.  Continue Toprol 75 mg daily, hold losartan and Aldactone.  Monitor volume status.     3/15/25 1351 AMA.     ED Treatment-Medication Administration from 03/15/2025 0316 to 03/16/2025 1835         Date/Time Order Dose Route Action     03/15/2025 0420 LORazepam (ATIVAN) injection 0.5 mg 0.5 mg Intravenous Given     03/15/2025 0530 sodium chloride 0.9 % bolus 500 mL 500 mL Intravenous New Bag     03/15/2025 0621 sodium chloride 0.9 % infusion 125 mL/hr Intravenous New Bag     03/15/2025 0530 magnesium sulfate 2 g/50 mL IVPB (premix) 2 g 2 g Intravenous New Bag     03/15/2025 1001 apixaban (ELIQUIS) tablet 5 mg 5 mg Oral Given     03/15/2025 1002 loratadine (CLARITIN) tablet 10 mg 10 mg Oral Given     03/15/2025 1003 finasteride (PROSCAR) tablet 5 mg 5 mg Oral Given     03/15/2025 1002 methocarbamol (ROBAXIN) tablet 500 mg 500 mg Oral Given     03/15/2025 1201 methocarbamol (ROBAXIN) tablet 500 mg 500 mg Oral Given     03/15/2025 1002 metoprolol succinate (TOPROL-XL) 24 hr tablet 75 mg 75 mg Oral Given            Scheduled Medications:  Medications Discontinued During This Encounter   Medication    metoprolol succinate (TOPROL-XL) 24 hr tablet 75 mg Daily Route: PO     methocarbamol (ROBAXIN) tablet 500 mg 4 times daily Route: PO     finasteride (PROSCAR) tablet 5 mg  Daily Route: PO     loratadine (CLARITIN) tablet 10 mg Daily Route: PO     apixaban (ELIQUIS) tablet 5 mg  2 times daily Route: PO     tamsulosin (FLOMAX) capsule 0.4 mg Daily with dinner Route: PO     sodium chloride 0.9 % infusion Rate: 125 mL/hr Dose: 125 mL/hr  Freq: Continuous Route: IV        ED Triage Vitals [03/15/25 0330]   Temperature Pulse Respirations Blood Pressure SpO2 Pain Score   98.7 °F  (37.1 °C) (!) 112 17 138/87 97 % No Pain     Weight (last 2 days) before discharge       Date/Time Weight    03/15/25 0330 93 (205.03)            Vital Signs (last 3 days) before discharge       Date/Time Temp Pulse Resp BP MAP (mmHg) SpO2 O2 Device Patient Position - Orthostatic VS [place] Coma Scale Score Clinical Opiate Withdrawal Scale Total Score Pain    03/15/25 1004 -- 87 -- 111/74 86 97 % -- -- -- -- --    03/15/25 1002 -- 91 -- 111/74 -- -- -- -- -- -- --    03/15/25 0930 -- 101 -- 126/61 77 97 % -- -- -- -- --    03/15/25 0626 -- -- -- -- -- -- -- -- 15 -- --    03/15/25 0615 -- 100 -- -- -- -- -- -- -- 3 --    03/15/25 0545 -- 98 20 119/81 94 99 % None (Room air) Lying -- -- --    03/15/25 0430 -- 101 18 123/80 96 95 % None (Room air) Lying -- -- --    03/15/25 0330 98.7 °F (37.1 °C) 112 17 138/87 108 97 % None (Room air) Lying -- -- No Pain            Date and Time Eye Opening Best Verbal Response Best Motor Response [place] Coma Scale Score User   03/15/25 0626 4 5 6 15        Pertinent Labs/Diagnostic Test Results:   Radiology:  XR chest 1 view portable   ED Interpretation by Marley Horan MD (03/15 0628)   No evidence of acute cardiopulmonary process.  Left sided pacemaker with leads in appropriate anatomic position.  Interpreted by me.      Final Interpretation by Susan Mendiola MD (03/15 1246)      No acute cardiopulmonary disease.            Workstation performed: [id number]           Cardiology:  ECG 12 lead   Final Result by Krishna Buckley MD (03/16 7573)   Normal sinus rhythm   Right axis deviation   Low voltage QRS   Cannot rule out Anterior infarct (cited on or before 15-Mar-2025)   Abnormal ECG   When compared with ECG of 15-Mar-2025 07:04, (unconfirmed)   Premature ventricular complexes are no longer Present   Criteria for Inferior infarct are no longer Present   Serial changes of Anterior infarct Present   Confirmed by Krishna Buckley ([id number]) on 3/16/2025 9:50:25 AM      ECG 12 lead    Final Result by Krishna Buckley MD (03/16 0950)   **Suspect arm lead reversal, interpretation assumes no reversal   Sinus rhythm with occasional Premature ventricular complexes   Low voltage QRS   Inferior infarct , age undetermined   Anterolateral infarct , age undetermined   Abnormal ECG   When compared with ECG of 15-Mar-2025 03:30, (unconfirmed)   Significant changes have occurred   Confirmed by Krishna Buckley ([id number]) on 3/16/2025 9:50:22 AM      ECG 12 lead   Final Result by Krishna Buckley MD (03/16 0950)   Sinus tachycardia   Low voltage QRS   Borderline ECG   When compared with ECG of 08-Sep-2024 11:33,   Premature ventricular complexes are no longer Present   Minimal criteria for Anteroseptal infarct are no longer Present   Nonspecific T wave abnormality no longer evident in Lateral leads   Confirmed by Krishna Buckley ([id number]) on 3/16/2025 9:50:19 AM        GI:  No orders to display       Results from last 7 days   Lab Units 03/15/25  0400   WBC Thousand/uL 8.70   HEMOGLOBIN g/dL 15.2   HEMATOCRIT % 41.0   PLATELETS Thousands/uL 306   TOTAL NEUT ABS Thousands/µL 7.22     Results from last 7 days   Lab Units 03/15/25  0911 03/15/25  0400   SODIUM mmol/L 128* 126*   POTASSIUM mmol/L 4.0 3.8   CHLORIDE mmol/L 99 95*   CO2 mmol/L 20* 19*   ANION GAP mmol/L 9 12   BUN mg/dL 11 14   CREATININE mg/dL 1.00 1.42*   EGFR ml/min/1.73sq m 79 52   CALCIUM mg/dL 9.4 9.3   MAGNESIUM mg/dL  --  1.6*     Results from last 7 days   Lab Units 03/15/25  0400   AST U/L 24   ALT U/L 19   ALK PHOS U/L 48   TOTAL PROTEIN g/dL 6.8   ALBUMIN g/dL 4.4   TOTAL BILIRUBIN mg/dL 0.75   BILIRUBIN DIRECT mg/dL 0.18     Results from last 7 days   Lab Units 03/15/25  0911 03/15/25  0400   GLUCOSE RANDOM mg/dL 83 147*     Results from last 7 days   Lab Units 03/15/25  0911   OSMOLALITY, SERUM mmol/*     Results from last 7 days   Lab Units 03/15/25  0400   CK TOTAL U/L 365*     Results from last 7 days   Lab Units 03/15/25  0935 03/15/25  0621  03/15/25  0400   HS TNI 0HR ng/L  --   --  28   HS TNI 2HR ng/L  --  45  --    HSTNI D2 ng/L  --  17  --    HS TNI 4HR ng/L 61*  --   --    HSTNI D4 ng/L 33*  --   --      Results from last 7 days   Lab Units 03/15/25  0621   TSH 3RD GENERATON uIU/mL 0.564     Results from last 7 days   Lab Units 03/15/25  0400   BNP pg/mL 141*     Results from last 7 days   Lab Units 03/15/25  0400   LIPASE u/L 15     Results from last 7 days   Lab Units 03/15/25  0911 03/15/25  0616   OSMOLALITY, SERUM mmol/*  --    OSMO UR mmol/KG  --  179*     Results from last 7 days   Lab Units 03/15/25  0616   CLARITY UA  Clear   COLOR UA  Green   SPEC GRAV UA  1.006   PH UA  6.0   GLUCOSE UA mg/dl Negative   KETONES UA mg/dl Negative   BLOOD UA  Negative   PROTEIN UA mg/dl Negative   NITRITE UA  Negative   BILIRUBIN UA  Negative   UROBILINOGEN UA (BE) mg/dl <2.0   LEUKOCYTES UA  Negative   SODIUM UR mmol/L 22.0     Results from last 7 days   Lab Units 03/15/25  0616   AMPH/METH  Negative   BARBITURATE UR  Negative   BENZODIAZEPINE UR  Negative   COCAINE UR  Positive*   METHADONE URINE  Negative   OPIATE UR  Negative   PCP UR  Negative   THC UR  Negative       Past Medical History:   Diagnosis Date    BPH (benign prostatic hyperplasia)     Cardiomyopathy (HCC)     Coronary artery disease 9/1/2021    Diabetes mellitus (HCC)     ICD (implantable cardioverter-defibrillator) in place     Obesity     Prostate cancer (HCC)      Present on Admission:  **None**      Admitting Diagnosis: Numbness [R20.0]  Age/Sex: 64 y.o. male    Network Utilization Review Department  ATTENTION: Please call with any questions or concerns to [phone] and carefully listen to the prompts so that you are directed to the right person. All voicemails are confidential.   For Discharge needs, contact Care Management DC Support Team at [phone] opt. [phone]  Send all requests for admission clinical reviews, approved or denied determinations and any other requests to  dedicated fax number below belonging to the campus where the patient is receiving treatment. List of dedicated fax numbers for the Facilities:  FACILITY NAME UR FAX NUMBER   ADMISSION DENIALS (Administrative/Medical Necessity) [phone]   DISCHARGE SUPPORT TEAM (NETWORK) [phone]   PARENT CHILD HEALTH (Maternity/NICU/Pediatrics) [phone]   [facility] [phone]   [facility] [phone]   [facility] [phone]   [facility] [phone]   Counts include 234 beds at the [facility] [phone]   [facility] [phone]   [facility] [phone]   [facility] [phone]   [facility] [phone]   [facility] [phone]   [facility] [phone]   [facility] [phone]

## 2025-03-17 ENCOUNTER — RESULTS FOLLOW-UP (OUTPATIENT)
Dept: EMERGENCY DEPT | Facility: HOSPITAL | Age: 65
End: 2025-03-17

## 2025-06-08 ENCOUNTER — HOSPITAL ENCOUNTER (EMERGENCY)
Facility: HOSPITAL | Age: 65
Discharge: HOME | End: 2025-06-08
Attending: EMERGENCY MEDICINE
Payer: COMMERCIAL

## 2025-06-08 VITALS
HEIGHT: 70 IN | BODY MASS INDEX: 29.35 KG/M2 | SYSTOLIC BLOOD PRESSURE: 129 MMHG | RESPIRATION RATE: 18 BRPM | TEMPERATURE: 98.2 F | HEART RATE: 106 BPM | WEIGHT: 205.03 LBS | OXYGEN SATURATION: 100 % | DIASTOLIC BLOOD PRESSURE: 75 MMHG

## 2025-06-08 DIAGNOSIS — K62.89 RECTAL PAIN: ICD-10-CM

## 2025-06-08 DIAGNOSIS — K64.5 THROMBOSED EXTERNAL HEMORRHOID: Primary | ICD-10-CM

## 2025-06-08 PROCEDURE — 99284 EMERGENCY DEPT VISIT MOD MDM: CPT | Performed by: EMERGENCY MEDICINE

## 2025-06-08 PROCEDURE — 99283 EMERGENCY DEPT VISIT LOW MDM: CPT

## 2025-06-08 RX ORDER — LIDOCAINE HYDROCHLORIDE 20 MG/ML
1 JELLY TOPICAL ONCE
Status: DISCONTINUED | OUTPATIENT
Start: 2025-06-08 | End: 2025-06-08 | Stop reason: HOSPADM

## 2025-06-08 RX ORDER — ACETAMINOPHEN 325 MG/1
650 TABLET ORAL ONCE
Status: DISCONTINUED | OUTPATIENT
Start: 2025-06-08 | End: 2025-06-08 | Stop reason: HOSPADM

## 2025-06-08 RX ORDER — POLYETHYLENE GLYCOL 3350 17 G/17G
17 POWDER, FOR SOLUTION ORAL DAILY
Qty: 119 G | Refills: 0 | Status: SHIPPED | OUTPATIENT
Start: 2025-06-08 | End: 2025-06-15
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
## 2025-06-09 NOTE — DISCHARGE INSTRUCTIONS
You were evaluated in the emergency department for: rectal pain. You can access your current and pending results through [facility]'s [employer]. A radiologist will take a second look at your X-Rays, if you had any, and you will be contacted with any new findings.     You should follow-up with your primary care provider, as soon as possible, for re-evaluation.  If you do not have a primary care provider, I have referred you to [facility]. You will be contacted about scheduling an appointment. Their phone number is also included on this paperwork.  You have also been referred to colorectal surgery and you should follow-up with them as well.    Your workup revealed no emergent features at this time; however, many disease processes are dynamic:    Please, return to the emergency department if you experience new or worsening symptoms, fever, chest pain, shortness of breath, difficulty breathing, dizziness, abdominal pain, persistent nausea/vomiting, syncope or passing out, blood in your urine or stool, coughing up blood, leg swelling/pain, urinary retention, bowel or bladder incontinence, numbness between your legs.

## 2025-06-18 NOTE — PROGRESS NOTES
Colon and Rectal Surgery   Dexter Reyes 65 y.o. male MRN [id number]  Encounter: [id number]  06/19/25 8:47 AM            Assessment: Dexter Reyes is a 65 y.o. male who had a thrombosed hemorrhoid.      Plan:   Thrombosed external hemorrhoid  The patient has a thrombosed external hemorrhoid that seems to be resolving based on examination.  His symptoms are gradually resolving.  The thrombosis is only subcentimeter size and I do not recommend surgical excision at this point, approximately 10 days after it started.  I recommend observation.    Prostate cancer (HCC)  The patient has a history of prostate cancer.  He has been treated at an outside institution.  He has moved to [place] and is rolling some of his work into our system.  I am putting in a urology consultation for him to use, should he prefer to move to [facility] or get a second opinion here.      Subjective     HPI    Dexter Reyes is a 65 y.o. male who is referred today by Dr. Elan Chang for thrombosed external hemorrhoid.     The patient was seen at the emergency department on 6/8/2025 with thrombosed external hemorrhoid.     The patient states that when he urinates he has to have a bowel movement , last colonoscopy was 8/29/2023, with Dr. Medrano through [place]; as per his recollection he has a history of polyps on his past 2 colonoscopies. The recent polyps in 2023 were non-adenomatous. Reports the anal lump remains the same until now; no pain, bleeding or drainage, just inflammation.     Historical Information   Past Medical History[1]  Past Surgical History[2]    Meds/Allergies     Current Medications[3]  Allergies[4]    Social History   Social History     Substance and Sexual Activity   Drug Use Yes    Types: Cocaine, Marijuana     Tobacco Use History[5]      Family History[6]      Review of Systems   Constitutional: Negative.    Gastrointestinal:  Positive for rectal pain.       Objective   Current Vitals:  Vitals:    06/19/25 0809  "  Weight: 87.5 kg (193 lb)   Height: 5' 10\" (1.778 m)         Physical Exam  Constitutional:       Appearance: Normal appearance.   Genitourinary:     Comments: The prostate is large and firm to hard.  It is very broad but symmetrical.  There is a right posterior thrombosed external hemorrhoid.  He has thickened perianal skin and redundant hemorrhoidal tissues.  These would be amenable to hemorrhoidectomy but his only symptom is the thrombosis.  The thrombosed hemorrhoid seems to be resolving.    Neurological:      Mental Status: He is alert.     Lower Endoscopy    Date/Time: 6/19/2025 8:20 AM    Performed by: LUIS Angulo MD  Authorized by: LUIS Angulo MD    Verbal consent obtained?: Yes    Consent given by:  Patient  Scope type:  Anoscope   The prostate is large and firm to hard.  It is very broad but symmetrical.  There is a right posterior thrombosed external hemorrhoid.  He has thickened perianal skin and redundant hemorrhoidal tissues.  These would be amenable to hemorrhoidectomy but his only symptom is the thrombosis.  The thrombosed hemorrhoid seems to be resolving.                   [1]   Past Medical History:  Diagnosis Date    BPH (benign prostatic hyperplasia)     Cardiomyopathy (HCC)     Coronary artery disease 9/1/2021    Diabetes mellitus (HCC)     ICD (implantable cardioverter-defibrillator) in place     Obesity     Prostate cancer (HCC)    [2]   Past Surgical History:  Procedure Laterality Date    CHOLECYSTECTOMY      HERNIA REPAIR     [3]   Current Outpatient Medications:     alfuzosin (UROXATRAL) 10 mg 24 hr tablet, Take 10 mg by mouth in the morning., Disp: , Rfl:     AMLODIPINE BESYLATE PO, , Disp: , Rfl:     apixaban (ELIQUIS) 5 mg, Take 5 mg by mouth in the morning and 5 mg in the evening., Disp: , Rfl:     cetirizine (ZyrTEC) 10 mg tablet, Take 1 tablet by mouth, Disp: , Rfl:     dextromethorphan-guaiFENesin (ROBITUSSIN DM)  mg/5 mL syrup, Take 5 mL by mouth 3 (three) times a " day as needed for cough, Disp: 118 mL, Rfl: 0    dutasteride (AVODART) 0.5 mg capsule, Take 1 capsule by mouth in the morning., Disp: , Rfl:     furosemide (LASIX) 20 mg tablet, Take 2 tablets by mouth, Disp: , Rfl:     losartan (COZAAR) 50 mg tablet, Take 50 mg by mouth, Disp: , Rfl:     metFORMIN (GLUCOPHAGE) 500 mg tablet, Take 1,000 mg by mouth in the morning and 1,000 mg in the evening. Take with meals., Disp: , Rfl:     metoprolol succinate (TOPROL-XL) 50 mg 24 hr tablet, Take 75 mg by mouth in the morning., Disp: , Rfl:     spironolactone (ALDACTONE) 25 mg tablet, Take 25 mg by mouth in the morning., Disp: , Rfl:     lidocaine (Lidoderm) 5 %, Apply 1 patch topically over 12 hours daily for 5 days Remove & Discard patch within 12 hours or as directed by MD (Patient not taking: Reported on 6/19/2025), Disp: 5 patch, Rfl: 0    methocarbamol (ROBAXIN) 500 mg tablet, Take 1 tablet (500 mg total) by mouth 4 (four) times a day (Patient not taking: Reported on 6/19/2025), Disp: 20 tablet, Rfl: 0    Misc. Devices (Sitz Bath) MISC, Use in the morning (Patient not taking: Reported on 6/19/2025), Disp: 1 each, Rfl: 0    polyethylene glycol (MIRALAX) 17 g packet, Take 17 g by mouth daily for 7 days Prn constipation (Patient not taking: Reported on 6/19/2025), Disp: 119 g, Rfl: 0  [4]   Allergies  Allergen Reactions    Liraglutide Shortness Of Breath    Pollen Extract Shortness Of Breath   [5]   Social History  Tobacco Use   Smoking Status Never   Smokeless Tobacco Never   [6] No family history on file.

## 2025-06-19 ENCOUNTER — OFFICE VISIT (OUTPATIENT)
Age: 65
End: 2025-06-19
Payer: COMMERCIAL

## 2025-06-19 VITALS — HEIGHT: 70 IN | WEIGHT: 193 LBS | BODY MASS INDEX: 27.63 KG/M2

## 2025-06-19 DIAGNOSIS — K64.5 THROMBOSED EXTERNAL HEMORRHOID: Primary | ICD-10-CM

## 2025-06-19 DIAGNOSIS — C61 PROSTATE CANCER (HCC): ICD-10-CM

## 2025-06-19 PROCEDURE — 46600 DIAGNOSTIC ANOSCOPY SPX: CPT | Performed by: COLON & RECTAL SURGERY

## 2025-06-19 PROCEDURE — 99203 OFFICE O/P NEW LOW 30 MIN: CPT | Performed by: COLON & RECTAL SURGERY

## 2025-06-19 NOTE — ASSESSMENT & PLAN NOTE
The patient has a history of prostate cancer.  He has been treated at an outside institution.  He has moved to [place] and is rolling some of his work into our system.  I am putting in a urology consultation for him to use, should he prefer to move to [facility] or get a second opinion here.

## 2025-06-19 NOTE — ASSESSMENT & PLAN NOTE
The patient has a thrombosed external hemorrhoid that seems to be resolving based on examination.  His symptoms are gradually resolving.  The thrombosis is only subcentimeter size and I do not recommend surgical excision at this point, approximately 10 days after it started.  I recommend observation.

## 2025-06-25 ENCOUNTER — TELEPHONE (OUTPATIENT)
Age: 65
End: 2025-06-25

## 2025-06-25 NOTE — TELEPHONE ENCOUNTER
Patient NPT JEROME     Insurance: [employer] PPO  Current Insurance?yes    Insurance E-verified? yes  History:   Reason for appointment/active symptoms?     History Cap /Transfer of care in area  Has the patient had any previous Urologist(s)?    Julio César Garrett MD [facility],  Was the patient seen in the ED? no     Labs/Imaging(Including Out Of Network)? yes     Records Requested? no  Records Visible in EPIC? yes     Personal history of cancer? prostate     Appointment:   Office location preference: [facility]  ?   Appointment Details:   Date:08/07/25    Time:  9:00  Location:  [facility]  Provider:    Does the appointment need further review? no

## 2025-08-13 ENCOUNTER — HOSPITAL ENCOUNTER (EMERGENCY)
Facility: HOSPITAL | Age: 65
Discharge: HOME | End: 2025-08-13
Attending: EMERGENCY MEDICINE
Payer: COMMERCIAL

## 2025-08-13 ENCOUNTER — APPOINTMENT (EMERGENCY)
Dept: CT IMAGING | Facility: HOSPITAL | Age: 65
End: 2025-08-13
Payer: COMMERCIAL